# Patient Record
Sex: MALE | Employment: OTHER | URBAN - METROPOLITAN AREA
[De-identification: names, ages, dates, MRNs, and addresses within clinical notes are randomized per-mention and may not be internally consistent; named-entity substitution may affect disease eponyms.]

---

## 2018-01-14 ENCOUNTER — APPOINTMENT (OUTPATIENT)
Dept: GENERAL RADIOLOGY | Age: 43
End: 2018-01-14
Attending: EMERGENCY MEDICINE
Payer: SELF-PAY

## 2018-01-14 ENCOUNTER — HOSPITAL ENCOUNTER (OUTPATIENT)
Age: 43
Setting detail: OBSERVATION
Discharge: HOME | End: 2018-01-15
Attending: EMERGENCY MEDICINE | Admitting: INTERNAL MEDICINE
Payer: SELF-PAY

## 2018-01-14 DIAGNOSIS — S81.809A OPEN WOUND OF LOWER EXTREMITY, UNSPECIFIED LATERALITY, INITIAL ENCOUNTER: ICD-10-CM

## 2018-01-14 DIAGNOSIS — S81.809A MULTIPLE OPEN WOUNDS OF LOWER LEG, UNSPECIFIED LATERALITY, INITIAL ENCOUNTER: Primary | ICD-10-CM

## 2018-01-14 DIAGNOSIS — F11.23 OPIOID DEPENDENCE WITH WITHDRAWAL (HCC): ICD-10-CM

## 2018-01-14 DIAGNOSIS — K13.79 UVULAR EDEMA: ICD-10-CM

## 2018-01-14 DIAGNOSIS — L03.119 CELLULITIS OF LOWER EXTREMITY, UNSPECIFIED LATERALITY: ICD-10-CM

## 2018-01-14 PROBLEM — L03.90 CELLULITIS: Status: RESOLVED | Noted: 2018-01-14 | Resolved: 2018-01-14

## 2018-01-14 PROBLEM — G89.29 CHRONIC PAIN: Status: ACTIVE | Noted: 2018-01-14

## 2018-01-14 PROBLEM — L03.90 CELLULITIS: Status: ACTIVE | Noted: 2018-01-14

## 2018-01-14 PROBLEM — I10 HTN (HYPERTENSION): Status: ACTIVE | Noted: 2018-01-14

## 2018-01-14 PROBLEM — I83.009 CHRONIC CUTANEOUS VENOUS STASIS ULCER (HCC): Status: ACTIVE | Noted: 2018-01-14

## 2018-01-14 PROBLEM — L97.909 CHRONIC CUTANEOUS VENOUS STASIS ULCER (HCC): Status: ACTIVE | Noted: 2018-01-14

## 2018-01-14 LAB
ALBUMIN SERPL-MCNC: 3.2 G/DL (ref 3.5–5)
ALBUMIN/GLOB SERPL: 0.8 {RATIO} (ref 1.1–2.2)
ALP SERPL-CCNC: 84 U/L (ref 45–117)
ALT SERPL-CCNC: 30 U/L (ref 12–78)
AMPHET UR QL SCN: NEGATIVE
ANION GAP SERPL CALC-SCNC: 10 MMOL/L (ref 5–15)
AST SERPL-CCNC: 23 U/L (ref 15–37)
BARBITURATES UR QL SCN: NEGATIVE
BASOPHILS # BLD: 0 K/UL (ref 0–0.1)
BASOPHILS NFR BLD: 0 % (ref 0–1)
BENZODIAZ UR QL: NEGATIVE
BILIRUB SERPL-MCNC: 0.7 MG/DL (ref 0.2–1)
BNP SERPL-MCNC: 84 PG/ML (ref 0–125)
BUN SERPL-MCNC: 19 MG/DL (ref 6–20)
BUN/CREAT SERPL: 21 (ref 12–20)
CALCIUM SERPL-MCNC: 9.1 MG/DL (ref 8.5–10.1)
CANNABINOIDS UR QL SCN: NEGATIVE
CHLORIDE SERPL-SCNC: 103 MMOL/L (ref 97–108)
CO2 SERPL-SCNC: 28 MMOL/L (ref 21–32)
COCAINE UR QL SCN: NEGATIVE
CREAT SERPL-MCNC: 0.89 MG/DL (ref 0.7–1.3)
CRP SERPL-MCNC: 3.19 MG/DL
D DIMER PPP FEU-MCNC: 4.66 MG/L FEU (ref 0–0.65)
DEPRECATED S PYO AG THROAT QL EIA: NEGATIVE
DIFFERENTIAL METHOD BLD: ABNORMAL
DRUG SCRN COMMENT,DRGCM: ABNORMAL
EOSINOPHIL # BLD: 0.2 K/UL (ref 0–0.4)
EOSINOPHIL NFR BLD: 2 % (ref 0–7)
ERYTHROCYTE [DISTWIDTH] IN BLOOD BY AUTOMATED COUNT: 12.8 % (ref 11.5–14.5)
ERYTHROCYTE [SEDIMENTATION RATE] IN BLOOD: 49 MM/HR (ref 0–15)
EST. AVERAGE GLUCOSE BLD GHB EST-MCNC: 111 MG/DL
GLOBULIN SER CALC-MCNC: 4 G/DL (ref 2–4)
GLUCOSE SERPL-MCNC: 129 MG/DL (ref 65–100)
HBA1C MFR BLD: 5.5 % (ref 4.2–6.3)
HCT VFR BLD AUTO: 38.9 % (ref 36.6–50.3)
HGB BLD-MCNC: 13 G/DL (ref 12.1–17)
LACTATE SERPL-SCNC: 1.5 MMOL/L (ref 0.4–2)
LYMPHOCYTES # BLD: 1.3 K/UL
LYMPHOCYTES NFR BLD: 12 % (ref 12–49)
MAGNESIUM SERPL-MCNC: 1.7 MG/DL (ref 1.6–2.4)
MCH RBC QN AUTO: 28.1 PG (ref 26–34)
MCHC RBC AUTO-ENTMCNC: 33.4 G/DL (ref 30–36.5)
MCV RBC AUTO: 84.2 FL (ref 80–99)
METHADONE UR QL: POSITIVE
MONOCYTES # BLD: 0.5 K/UL (ref 0–1)
MONOCYTES NFR BLD: 5 % (ref 5–13)
NEUTS SEG # BLD: 8.9 K/UL (ref 1.8–8)
NEUTS SEG NFR BLD: 81 % (ref 32–75)
OPIATES UR QL: POSITIVE
PCP UR QL: NEGATIVE
PLATELET # BLD AUTO: 449 K/UL (ref 150–400)
POTASSIUM SERPL-SCNC: 3.5 MMOL/L (ref 3.5–5.1)
PROT SERPL-MCNC: 7.2 G/DL (ref 6.4–8.2)
RBC # BLD AUTO: 4.62 M/UL (ref 4.1–5.7)
RBC MORPH BLD: ABNORMAL
SODIUM SERPL-SCNC: 141 MMOL/L (ref 136–145)
WBC # BLD AUTO: 10.9 K/UL (ref 4.1–11.1)

## 2018-01-14 PROCEDURE — 99283 EMERGENCY DEPT VISIT LOW MDM: CPT

## 2018-01-14 PROCEDURE — 96374 THER/PROPH/DIAG INJ IV PUSH: CPT

## 2018-01-14 PROCEDURE — 85025 COMPLETE CBC W/AUTO DIFF WBC: CPT | Performed by: EMERGENCY MEDICINE

## 2018-01-14 PROCEDURE — 74011250636 HC RX REV CODE- 250/636

## 2018-01-14 PROCEDURE — 99218 HC RM OBSERVATION: CPT

## 2018-01-14 PROCEDURE — 83735 ASSAY OF MAGNESIUM: CPT | Performed by: EMERGENCY MEDICINE

## 2018-01-14 PROCEDURE — 87040 BLOOD CULTURE FOR BACTERIA: CPT | Performed by: EMERGENCY MEDICINE

## 2018-01-14 PROCEDURE — 83880 ASSAY OF NATRIURETIC PEPTIDE: CPT | Performed by: EMERGENCY MEDICINE

## 2018-01-14 PROCEDURE — 74011250637 HC RX REV CODE- 250/637: Performed by: INTERNAL MEDICINE

## 2018-01-14 PROCEDURE — 80307 DRUG TEST PRSMV CHEM ANLYZR: CPT | Performed by: INTERNAL MEDICINE

## 2018-01-14 PROCEDURE — 74011000258 HC RX REV CODE- 258: Performed by: EMERGENCY MEDICINE

## 2018-01-14 PROCEDURE — 96375 TX/PRO/DX INJ NEW DRUG ADDON: CPT

## 2018-01-14 PROCEDURE — 85379 FIBRIN DEGRADATION QUANT: CPT | Performed by: EMERGENCY MEDICINE

## 2018-01-14 PROCEDURE — 74011250636 HC RX REV CODE- 250/636: Performed by: INTERNAL MEDICINE

## 2018-01-14 PROCEDURE — 73590 X-RAY EXAM OF LOWER LEG: CPT

## 2018-01-14 PROCEDURE — 83605 ASSAY OF LACTIC ACID: CPT | Performed by: EMERGENCY MEDICINE

## 2018-01-14 PROCEDURE — 80053 COMPREHEN METABOLIC PANEL: CPT | Performed by: EMERGENCY MEDICINE

## 2018-01-14 PROCEDURE — 74011000250 HC RX REV CODE- 250: Performed by: INTERNAL MEDICINE

## 2018-01-14 PROCEDURE — 87880 STREP A ASSAY W/OPTIC: CPT | Performed by: INTERNAL MEDICINE

## 2018-01-14 PROCEDURE — 87070 CULTURE OTHR SPECIMN AEROBIC: CPT | Performed by: INTERNAL MEDICINE

## 2018-01-14 PROCEDURE — 74011250636 HC RX REV CODE- 250/636: Performed by: EMERGENCY MEDICINE

## 2018-01-14 PROCEDURE — 36415 COLL VENOUS BLD VENIPUNCTURE: CPT | Performed by: EMERGENCY MEDICINE

## 2018-01-14 PROCEDURE — 83036 HEMOGLOBIN GLYCOSYLATED A1C: CPT | Performed by: INTERNAL MEDICINE

## 2018-01-14 PROCEDURE — 85652 RBC SED RATE AUTOMATED: CPT | Performed by: EMERGENCY MEDICINE

## 2018-01-14 PROCEDURE — 86140 C-REACTIVE PROTEIN: CPT | Performed by: EMERGENCY MEDICINE

## 2018-01-14 RX ORDER — DEXAMETHASONE SODIUM PHOSPHATE 10 MG/ML
10 INJECTION INTRAMUSCULAR; INTRAVENOUS
Status: COMPLETED | OUTPATIENT
Start: 2018-01-14 | End: 2018-01-14

## 2018-01-14 RX ORDER — HYDRALAZINE HYDROCHLORIDE 20 MG/ML
10 INJECTION INTRAMUSCULAR; INTRAVENOUS
Status: DISCONTINUED | OUTPATIENT
Start: 2018-01-14 | End: 2018-01-15 | Stop reason: HOSPADM

## 2018-01-14 RX ORDER — CHOLECALCIFEROL (VITAMIN D3) 125 MCG
8 CAPSULE ORAL AS NEEDED
COMMUNITY
End: 2018-04-15

## 2018-01-14 RX ORDER — METHADONE HYDROCHLORIDE 10 MG/1
130 TABLET ORAL DAILY
Status: DISCONTINUED | OUTPATIENT
Start: 2018-01-14 | End: 2018-01-15 | Stop reason: HOSPADM

## 2018-01-14 RX ORDER — AMOXICILLIN 250 MG
1 CAPSULE ORAL DAILY
Status: DISCONTINUED | OUTPATIENT
Start: 2018-01-14 | End: 2018-01-15 | Stop reason: HOSPADM

## 2018-01-14 RX ORDER — ONDANSETRON 2 MG/ML
4 INJECTION INTRAMUSCULAR; INTRAVENOUS
Status: DISCONTINUED | OUTPATIENT
Start: 2018-01-14 | End: 2018-01-15 | Stop reason: HOSPADM

## 2018-01-14 RX ORDER — HYDROMORPHONE HYDROCHLORIDE 2 MG/ML
1 INJECTION, SOLUTION INTRAMUSCULAR; INTRAVENOUS; SUBCUTANEOUS
Status: COMPLETED | OUTPATIENT
Start: 2018-01-14 | End: 2018-01-14

## 2018-01-14 RX ORDER — HEPARIN SODIUM 5000 [USP'U]/ML
5000 INJECTION, SOLUTION INTRAVENOUS; SUBCUTANEOUS EVERY 8 HOURS
Status: DISCONTINUED | OUTPATIENT
Start: 2018-01-14 | End: 2018-01-15 | Stop reason: HOSPADM

## 2018-01-14 RX ORDER — KETOROLAC TROMETHAMINE 30 MG/ML
30 INJECTION, SOLUTION INTRAMUSCULAR; INTRAVENOUS EVERY 6 HOURS
Status: COMPLETED | OUTPATIENT
Start: 2018-01-14 | End: 2018-01-15

## 2018-01-14 RX ORDER — SODIUM CHLORIDE 0.9 % (FLUSH) 0.9 %
5-10 SYRINGE (ML) INJECTION AS NEEDED
Status: DISCONTINUED | OUTPATIENT
Start: 2018-01-14 | End: 2018-01-15 | Stop reason: HOSPADM

## 2018-01-14 RX ORDER — KETOROLAC TROMETHAMINE 30 MG/ML
30 INJECTION, SOLUTION INTRAMUSCULAR; INTRAVENOUS
Status: COMPLETED | OUTPATIENT
Start: 2018-01-14 | End: 2018-01-14

## 2018-01-14 RX ORDER — SODIUM CHLORIDE 0.9 % (FLUSH) 0.9 %
5-10 SYRINGE (ML) INJECTION EVERY 8 HOURS
Status: DISCONTINUED | OUTPATIENT
Start: 2018-01-14 | End: 2018-01-15 | Stop reason: HOSPADM

## 2018-01-14 RX ORDER — DIPHENHYDRAMINE HYDROCHLORIDE 50 MG/ML
INJECTION, SOLUTION INTRAMUSCULAR; INTRAVENOUS
Status: DISPENSED
Start: 2018-01-14 | End: 2018-01-14

## 2018-01-14 RX ORDER — OXYCODONE HYDROCHLORIDE 5 MG/1
20 TABLET ORAL
Status: DISCONTINUED | OUTPATIENT
Start: 2018-01-14 | End: 2018-01-15 | Stop reason: HOSPADM

## 2018-01-14 RX ORDER — ONDANSETRON 2 MG/ML
4 INJECTION INTRAMUSCULAR; INTRAVENOUS
Status: COMPLETED | OUTPATIENT
Start: 2018-01-14 | End: 2018-01-14

## 2018-01-14 RX ORDER — VANCOMYCIN/0.9 % SOD CHLORIDE 1.5G/250ML
1500 PLASTIC BAG, INJECTION (ML) INTRAVENOUS
Status: COMPLETED | OUTPATIENT
Start: 2018-01-14 | End: 2018-01-14

## 2018-01-14 RX ORDER — DIPHENHYDRAMINE HYDROCHLORIDE 50 MG/ML
25 INJECTION, SOLUTION INTRAMUSCULAR; INTRAVENOUS
Status: COMPLETED | OUTPATIENT
Start: 2018-01-14 | End: 2018-01-14

## 2018-01-14 RX ORDER — VANCOMYCIN/0.9 % SOD CHLORIDE 1.5G/250ML
1500 PLASTIC BAG, INJECTION (ML) INTRAVENOUS EVERY 8 HOURS
Status: DISCONTINUED | OUTPATIENT
Start: 2018-01-14 | End: 2018-01-15 | Stop reason: HOSPADM

## 2018-01-14 RX ORDER — KETOROLAC TROMETHAMINE 30 MG/ML
INJECTION, SOLUTION INTRAMUSCULAR; INTRAVENOUS
Status: COMPLETED
Start: 2018-01-14 | End: 2018-01-14

## 2018-01-14 RX ORDER — OXYCODONE HYDROCHLORIDE 5 MG/1
20 TABLET ORAL
Status: DISCONTINUED | OUTPATIENT
Start: 2018-01-14 | End: 2018-01-14

## 2018-01-14 RX ORDER — LABETALOL HYDROCHLORIDE 5 MG/ML
10 INJECTION, SOLUTION INTRAVENOUS
Status: DISCONTINUED | OUTPATIENT
Start: 2018-01-14 | End: 2018-01-15 | Stop reason: HOSPADM

## 2018-01-14 RX ORDER — NALOXONE HYDROCHLORIDE 0.4 MG/ML
0.4 INJECTION, SOLUTION INTRAMUSCULAR; INTRAVENOUS; SUBCUTANEOUS AS NEEDED
Status: DISCONTINUED | OUTPATIENT
Start: 2018-01-14 | End: 2018-01-15 | Stop reason: HOSPADM

## 2018-01-14 RX ORDER — TRAMADOL HYDROCHLORIDE 50 MG/1
100 TABLET ORAL
Status: DISCONTINUED | OUTPATIENT
Start: 2018-01-14 | End: 2018-01-15 | Stop reason: HOSPADM

## 2018-01-14 RX ORDER — DEXAMETHASONE SODIUM PHOSPHATE 10 MG/ML
10 INJECTION INTRAMUSCULAR; INTRAVENOUS EVERY 6 HOURS
Status: DISCONTINUED | OUTPATIENT
Start: 2018-01-14 | End: 2018-01-15 | Stop reason: HOSPADM

## 2018-01-14 RX ADMIN — OXYCODONE HYDROCHLORIDE 20 MG: 5 TABLET ORAL at 22:35

## 2018-01-14 RX ADMIN — HEPARIN SODIUM 5000 UNITS: 5000 INJECTION, SOLUTION INTRAVENOUS; SUBCUTANEOUS at 22:35

## 2018-01-14 RX ADMIN — PIPERACILLIN SODIUM AND TAZOBACTAM SODIUM 3.38 G: 3; .375 INJECTION, POWDER, LYOPHILIZED, FOR SOLUTION INTRAVENOUS at 03:39

## 2018-01-14 RX ADMIN — FAMOTIDINE 20 MG: 10 INJECTION, SOLUTION INTRAVENOUS at 09:32

## 2018-01-14 RX ADMIN — ONDANSETRON 4 MG: 2 INJECTION INTRAMUSCULAR; INTRAVENOUS at 03:19

## 2018-01-14 RX ADMIN — DEXAMETHASONE SODIUM PHOSPHATE 10 MG: 10 INJECTION, SOLUTION INTRAMUSCULAR; INTRAVENOUS at 05:36

## 2018-01-14 RX ADMIN — OXYCODONE HYDROCHLORIDE 20 MG: 5 TABLET ORAL at 14:23

## 2018-01-14 RX ADMIN — TRAMADOL HYDROCHLORIDE 100 MG: 50 TABLET, FILM COATED ORAL at 15:54

## 2018-01-14 RX ADMIN — FAMOTIDINE 20 MG: 10 INJECTION, SOLUTION INTRAVENOUS at 20:35

## 2018-01-14 RX ADMIN — HEPARIN SODIUM 5000 UNITS: 5000 INJECTION, SOLUTION INTRAVENOUS; SUBCUTANEOUS at 14:12

## 2018-01-14 RX ADMIN — METHADONE HYDROCHLORIDE 130 MG: 10 TABLET ORAL at 09:40

## 2018-01-14 RX ADMIN — KETOROLAC TROMETHAMINE 30 MG: 30 INJECTION, SOLUTION INTRAMUSCULAR at 17:53

## 2018-01-14 RX ADMIN — VANCOMYCIN HYDROCHLORIDE 1500 MG: 10 INJECTION, POWDER, LYOPHILIZED, FOR SOLUTION INTRAVENOUS at 04:00

## 2018-01-14 RX ADMIN — OXYCODONE HYDROCHLORIDE 20 MG: 5 TABLET ORAL at 04:07

## 2018-01-14 RX ADMIN — KETOROLAC TROMETHAMINE 30 MG: 30 INJECTION, SOLUTION INTRAMUSCULAR at 07:55

## 2018-01-14 RX ADMIN — KETOROLAC TROMETHAMINE 30 MG: 30 INJECTION, SOLUTION INTRAMUSCULAR at 23:26

## 2018-01-14 RX ADMIN — OXYCODONE HYDROCHLORIDE 20 MG: 5 TABLET ORAL at 07:57

## 2018-01-14 RX ADMIN — VANCOMYCIN HYDROCHLORIDE 1500 MG: 10 INJECTION, POWDER, LYOPHILIZED, FOR SOLUTION INTRAVENOUS at 12:07

## 2018-01-14 RX ADMIN — HEPARIN SODIUM 5000 UNITS: 5000 INJECTION, SOLUTION INTRAVENOUS; SUBCUTANEOUS at 06:00

## 2018-01-14 RX ADMIN — TRAMADOL HYDROCHLORIDE 100 MG: 50 TABLET, FILM COATED ORAL at 05:18

## 2018-01-14 RX ADMIN — OXYCODONE HYDROCHLORIDE 20 MG: 5 TABLET ORAL at 17:53

## 2018-01-14 RX ADMIN — DEXAMETHASONE SODIUM PHOSPHATE 10 MG: 10 INJECTION, SOLUTION INTRAMUSCULAR; INTRAVENOUS at 12:07

## 2018-01-14 RX ADMIN — Medication 10 ML: at 22:35

## 2018-01-14 RX ADMIN — Medication 10 ML: at 14:12

## 2018-01-14 RX ADMIN — DIPHENHYDRAMINE HYDROCHLORIDE 25 MG: 50 INJECTION, SOLUTION INTRAMUSCULAR; INTRAVENOUS at 05:36

## 2018-01-14 RX ADMIN — DEXAMETHASONE SODIUM PHOSPHATE 10 MG: 10 INJECTION, SOLUTION INTRAMUSCULAR; INTRAVENOUS at 23:25

## 2018-01-14 RX ADMIN — VANCOMYCIN HYDROCHLORIDE 1500 MG: 10 INJECTION, POWDER, LYOPHILIZED, FOR SOLUTION INTRAVENOUS at 20:34

## 2018-01-14 RX ADMIN — HYDROMORPHONE HYDROCHLORIDE 1 MG: 2 INJECTION INTRAMUSCULAR; INTRAVENOUS; SUBCUTANEOUS at 03:22

## 2018-01-14 RX ADMIN — DEXAMETHASONE SODIUM PHOSPHATE 10 MG: 10 INJECTION, SOLUTION INTRAMUSCULAR; INTRAVENOUS at 17:53

## 2018-01-14 RX ADMIN — KETOROLAC TROMETHAMINE 30 MG: 30 INJECTION, SOLUTION INTRAMUSCULAR at 09:31

## 2018-01-14 RX ADMIN — KETOROLAC TROMETHAMINE 30 MG: 30 INJECTION, SOLUTION INTRAMUSCULAR at 03:39

## 2018-01-14 NOTE — ED TRIAGE NOTES
My legs and ankles have been swelled up for over a month, seen at [facility] twice and walked out  Before finishing treatment. My legs are weaping they are so full and I can barely move because of the pain. Cannot put legs up in bed because it just makes them hurt more. I do not want you to do anything to my legs; I just want the pain to be gone.

## 2018-01-14 NOTE — PROGRESS NOTES
0715 - TRANSFER - IN REPORT:    Verbal report received from Dot Castle RN(name) on Rita Ospina  being received from [address](unit) for routine progression of care      Report consisted of patients Situation, Background, Assessment and   Recommendations(SBAR). Information from the following report(s) SBAR, ED Summary, MAR, Recent Results and Cardiac Rhythm sinus tachycardia was reviewed with the receiving nurse. Opportunity for questions and clarification was provided. 3461 - Patient arrived via stretcher and assisted to transfer to bed. Patient is moaning out in pain and states he cannot stand the pain in his legs. He is unable to get comfortable or lie still in the bed. Family practice paged and Dr. Ciara Patricia to come and see patient. Patient denies any shortness of breath or difficulty breathing; he is on room air. He states he is thirsty and wants water to drink. Patient given water, which he was able to drink without any difficulty. Primary Nurse Saeid Pérez RN and Brenden Hammond RN performed a dual skin assessment on this patient Impairment noted- see wound doc flow sheet. He has multiple lesions on bilateral lower extremities that appear like venous stasis type ulcers, which are open and weeping. He has blisters to bilateral heels. Jeremiah score is 22     1045 - I spoke to Dr. Chetan Sharp regarding vascular consult ordered. He will be in later this afternoon to see the patient. TRANSFER - OUT REPORT:    Verbal report given to Jessica Valdivia RN(name) on Rita Ospina  being transferred to 4th floor(unit) for routine progression of care       Report consisted of patients Situation, Background, Assessment and   Recommendations(SBAR). Information from the following report(s) SBAR, MAR, Recent Results and Cardiac Rhythm NSR was reviewed with the receiving nurse.     Lines:   Peripheral IV 01/14/18 Left Antecubital (Active)   Site Assessment Clean, dry, & intact 1/14/2018 12:00 PM   Phlebitis Assessment 0 1/14/2018 12:00 PM   Infiltration Assessment 0 1/14/2018 12:00 PM   Dressing Status Clean, dry, & intact 1/14/2018 12:00 PM   Dressing Type Transparent;Tape 1/14/2018 12:00 PM   Hub Color/Line Status Pink;Capped 1/14/2018 12:00 PM   Action Taken Open ports on tubing capped 1/14/2018 12:00 PM   Alcohol Cap Used Yes 1/14/2018 12:00 PM       Peripheral IV 01/14/18 Right Antecubital (Active)   Site Assessment Drainage (comment) 1/14/2018 12:00 PM   Phlebitis Assessment 0 1/14/2018 12:00 PM   Infiltration Assessment 0 1/14/2018 12:00 PM   Dressing Status Clean, dry, & intact 1/14/2018 12:00 PM   Dressing Type Transparent;Tape 1/14/2018 12:00 PM   Hub Color/Line Status Pink;Capped 1/14/2018 12:00 PM   Action Taken Open ports on tubing capped 1/14/2018 12:00 PM   Alcohol Cap Used Yes 1/14/2018 12:00 PM        Opportunity for questions and clarification was provided.       Patient transported with:   Royal Madina

## 2018-01-14 NOTE — IP AVS SNAPSHOT
[facility] 
 
 
 [address] 
[phone] Patient: Nadja Batres MRN: [id number] :1975 About your hospitalization You were admitted on:  2018 You last received care in the:  [facility] 4M POST SURG ORT 2 You were discharged on:  January 15, 2018 Why you were hospitalized Your primary diagnosis was:  Not on File Your diagnoses also included:  Cellulitis, Open Leg Wound, Chronic Cutaneous Venous Stasis Ulcer (Hcc), Htn (Hypertension), Chronic Pain Follow-up Information Follow up With Details Comments Contact Info Houston Salas MD Go on 2018 New patient primary care [facility] follow-up appointment at 4:00PM. Please arrive 30 minutes early to check in with your photo ID and insurance card. [address] [facility] 
[phone] 
  
   [facility] wound care clinic -[phone] Your Scheduled Appointments 2018 12:30 PM EST  
WOUND CARE NEW PATIENT with Brooke Travis MD, [address] [place] [address] ([address]) [facility] [address] [facility] [phone] 2018  4:00 PM EST New Patient with Houston Salas MD  
[address] [place] [address] and Primary Care [facility] [address] [id number]  
[phone] Discharge Orders None A check nadine indicates which time of day the medication should be taken. My Medications START taking these medications Instructions Each Dose to Equal  
 Morning Noon Evening Bedtime  
 methadone 10 mg tablet Commonly known as:  DOLOPHINE Start taking on:  2018 Replaces:  METHADONE (BULK) Your last dose was:  1/15/18 0800 a.m. Your next dose is:  18 morning Take 13 Tabs by mouth daily for 3 days. Max Daily Amount: 130 mg. 130 mg CONTINUE taking these medications Instructions Each Dose to Equal  
 Morning Noon Evening Bedtime ALEVE 220 mg Cap Generic drug:  naproxen sodium Your last dose was:  Have not had in the hospital.   
Your next dose is:  Anytime as needed. Take 8 Caps by mouth as needed. 8 Cap HYDROcodone-acetaminophen 5-325 mg per tablet Commonly known as:  Gerald Ontiverosens Your last dose was:  Have not had in the hospital.   
Your next dose is:  Anytime as needed. Take 1 Tab by mouth every four (4) hours as needed for Pain. 1 Tab  
    
   
   
   
  
 ibuprofen 600 mg tablet Commonly known as:  MOTRIN Your last dose was:  Have not taken in the hospital.   
Your next dose is:  Anytime as needed. Take 1 Tab by mouth every six (6) hours as needed for Pain. 600 mg  
    
   
   
   
  
  
STOP taking these medications METHADONE (BULK) Replaced by:  methadone 10 mg tablet Where to Get Your Medications Information on where to get these meds will be given to you by the nurse or doctor. ! Ask your nurse or doctor about these medications  
  methadone 10 mg tablet Discharge Instructions ACUTE DIAGNOSES: 
Chronic cutaneous venous stasis ulcer ([facility] [address] Chronic cutaneous venous stasis ulcer ([facility] [address] CHRONIC MEDICAL DIAGNOSES: 
Problem List as of 1/15/2018  Date Reviewed: 1/15/2018 Codes Class Noted - Resolved Open leg wound ICD-10-CM: L84.986U ICD-9-CM: 891.0  1/14/2018 - Present Chronic cutaneous venous stasis ulcer (HCC) ICD-10-CM: I83.009 ICD-9-CM: 454.0  1/14/2018 - Present HTN (hypertension) ICD-10-CM: I10 
ICD-9-CM: 401.9  1/14/2018 - Present Chronic pain ICD-10-CM: G89.29 ICD-9-CM: 338.29  1/14/2018 - Present RESOLVED: Cellulitis ICD-10-CM: L03.90 ICD-9-CM: 682.9  1/14/2018 - 1/14/2018 DISCHARGE MEDICATIONS:  
  
 
 
 · It is important that you take the medication exactly as they are prescribed. · Keep your medication in the bottles provided by the pharmacist and keep a list of the medication names, dosages, and times to be taken in your wallet. · Do not take other medications without consulting your doctor. DIET:  Regular Diet ACTIVITY: Activity as tolerated ADDITIONAL INFORMATION: If you experience any of the following symptoms then please call your primary care physician or return to the emergency room if you cannot get hold of your doctor: Fever, chills, nausea, vomiting, diarrhea, change in mentation, falling, bleeding, shortness of breath. FOLLOW UP CARE: 
Primary care physician. you are to call and set up an appointment to see them in 2 weeks. pain and wound clinic Information obtained by : 
I understand that if any problems occur once I am at home I am to contact my physician. I understand and acknowledge receipt of the instructions indicated above. Physician's or R.N.'s Signature                                                                  Date/Time Patient or Representative Signature                                                          Date/Time Introducing [facility]! Lauren Platt introduces [employer] patient portal. Now you can access parts of your medical record, email your doctor's office, and request medication refills online. 1. In your internet browser, go to [email or web address] 2. Click on the First Time User? Click Here link in the Sign In box. You will see the New Member Sign Up page. 3. Enter your [employer] Access Code exactly as it appears below.  You will not need to use this code after youve completed the sign-up process. If you do not sign up before the expiration date, you must request a new code. · [employer] Access Code: [email or web address] Expires: 4/15/2018 10:13 AM 
 
4. Enter the last four digits of your Social Security Number (xxxx) and Date of Birth (mm/dd/yyyy) as indicated and click Submit. You will be taken to the next sign-up page. 5. Create a [employer] ID. This will be your [employer] login ID and cannot be changed, so think of one that is secure and easy to remember. 6. Create a [employer] password. You can change your password at any time. 7. Enter your Password Reset Question and Answer. This can be used at a later time if you forget your password. 8. Enter your e-mail address. You will receive e-mail notification when new information is available in [address] 9. Click Sign Up. You can now view and download portions of your medical record. 10. Click the Download Summary menu link to download a portable copy of your medical information. If you have questions, please visit the Frequently Asked Questions section of the [employer] website. Remember, [employer] is NOT to be used for urgent needs. For medical emergencies, dial 911. Now available from your iPhone and Android! Unresulted Labs-Please follow up with your PCP about these lab tests Order Current Status CULTURE, THROAT In process CULTURE, BLOOD, PAIRED Preliminary result Providers Seen During Your Hospitalization Provider Specialty Primary office phone Maulik Barrow MD Emergency Medicine [phone] Theodore Watson MD Internal Medicine [phone] Your Primary Care Physician (PCP) Primary Care Physician Office Phone Office Fax NONE ** None ** ** None ** You are allergic to the following Allergen Reactions Pcn (Penicillins) Swelling Recent Documentation Height Weight BMI Smoking Status 1.727 m 95.3 kg 31.93 kg/m2 Current Every Day Smoker Emergency Contacts Name Discharge Info Relation Home Work Mobile Qi Álvarez DISCHARGE CAREGIVER [3] Other Relative [6] [phone] Patient Belongings The following personal items are in your possession at time of discharge: 
  Dental Appliances: None  Visual Aid: None      Home Medications: None   Jewelry: None  Clothing: At bedside    Other Valuables: None  Personal Items Sent to Safe: n/a Please provide this summary of care documentation to your next provider. Signatures-by signing, you are acknowledging that this After Visit Summary has been reviewed with you and you have received a copy. Patient Signature:  ____________________________________________________________ Date:  ____________________________________________________________  
  
Jana Kuhn Provider Signature:  ____________________________________________________________ Date:  ____________________________________________________________

## 2018-01-14 NOTE — ED NOTES
Patient continues to refuse to stay on the cardiac monitor. Pt continues to refuse to have BP taken.

## 2018-01-14 NOTE — ED NOTES
Pt report given to [facility] paramedic with [facility] pt remains stable at time of discharge

## 2018-01-14 NOTE — IP AVS SNAPSHOT
[address] 
[phone] Patient: Anju Mary MRN: [id number] :1975 A check nadine indicates which time of day the medication should be taken. My Medications START taking these medications Instructions Each Dose to Equal  
 Morning Noon Evening Bedtime  
 methadone 10 mg tablet Commonly known as:  DOLOPHINE Start taking on:  2018 Replaces:  METHADONE (BULK) Your last dose was:  1/15/18 0800 a.m. Your next dose is:  18 morning Take 13 Tabs by mouth daily for 3 days. Max Daily Amount: 130 mg.  
 130 mg CONTINUE taking these medications Instructions Each Dose to Equal  
 Morning Noon Evening Bedtime ALEVE 220 mg Cap Generic drug:  naproxen sodium Your last dose was:  Have not had in the hospital.   
Your next dose is:  Anytime as needed. Take 8 Caps by mouth as needed. 8 Cap HYDROcodone-acetaminophen 5-325 mg per tablet Commonly known as:  Destini Waldrop Your last dose was:  Have not had in the hospital.   
Your next dose is:  Anytime as needed. Take 1 Tab by mouth every four (4) hours as needed for Pain. 1 Tab  
    
   
   
   
  
 ibuprofen 600 mg tablet Commonly known as:  MOTRIN Your last dose was:  Have not taken in the hospital.   
Your next dose is:  Anytime as needed. Take 1 Tab by mouth every six (6) hours as needed for Pain. 600 mg  
    
   
   
   
  
  
STOP taking these medications METHADONE (BULK) Replaced by:  methadone 10 mg tablet Where to Get Your Medications Information on where to get these meds will be given to you by the nurse or doctor. ! Ask your nurse or doctor about these medications  
  methadone 10 mg tablet

## 2018-01-14 NOTE — ED NOTES
Patient complaining that we are not giving him enough medicine and that he is just going to take his own and removes 2 blue pills from pocket. Patient states these are Aleve. Requested patient to not take these so that the doctor can know what he is taking and manage his care. Patient did not take the medicine but has them lying on the bedside table. Unsure how many more pills or other medicines patient has in his pockets.

## 2018-01-14 NOTE — H&P
[facility]  [address], [address]  [phone]    Admission History and Physical      NAME:  Erich Marshall   :   1975   MRN:  [id number]     PCP:  None     Date/Time:  2018         Subjective:     CHIEF COMPLAINT: leg pain      HISTORY OF PRESENT ILLNESS:     Mr. Dania Boas is a 36 y.o.  male who is admitted with BL leg wound/ pain. Mr. Dania Boas with Hx of chronic leg pain presented to ER c/o severe bl leg pain. The pian is chronic, but worsened the last 3-4 weeks after he stopped taking methadone. Pt stated that he missed his regular visit at methadone clinic and for the last 5 days, his methadone was discontinued. Denies fever or SOB. The pain is sharp, severe in intensity on BL legs, worse with movement. History reviewed. No pertinent past medical history. Past Surgical History:   Procedure Laterality Date    HX CORONARY STENT PLACEMENT         Social History   Substance Use Topics    Smoking status: Current Every Day Smoker     Packs/day: 1.00    Smokeless tobacco: Never Used    Alcohol use No        Family History   Problem Relation Age of Onset    Family history unknown: Yes        Allergies   Allergen Reactions    Pcn [Penicillins] Swelling        Prior to Admission medications    Medication Sig Start Date End Date Taking? Authorizing Provider   naproxen sodium (ALEVE) 220 mg cap Take 8 Caps by mouth as needed. Yes Phys Other, MD   METHADONE HCL (METHADONE, BULK,) 130 mg by Does Not Apply route daily. Yes Phys Other, MD   ibuprofen (MOTRIN) 600 mg tablet Take 1 Tab by mouth every six (6) hours as needed for Pain.    BRYSON Eason   HYDROcodone-acetaminophen (NORCO) 5-325 mg per tablet Take 1 Tab by mouth every four (4) hours as needed for Pain.     BRYSON Eason         Review of Systems:  (bold if positive, if negative)    Gen:  Eyes:  ENT:  CVS:  Pulm:  GI:    :    MS:  Pain,Skin:  Psych: Endo:    Hem:  Renal:    Neuro:            Objective:      VITALS:    Vital signs reviewed; most recent are:    Visit Vitals    /90 (BP 1 Location: Right arm, BP Patient Position: During activity)    Pulse (!) 105    Temp 97.9 °F (36.6 °C)    Resp 18    Ht 5' 8\" (1.727 m)    Wt 95.3 kg (210 lb)    SpO2 98%    BMI 31.93 kg/m2     SpO2 Readings from Last 6 Encounters:   01/14/18 98%   06/25/13 93%          Intake/Output Summary (Last 24 hours) at 01/14/18 0930  Last data filed at 01/14/18 0914   Gross per 24 hour   Intake                0 ml   Output              250 ml   Net             -250 ml            Exam:     Physical Exam:    Gen:  Well-developed, well-nourished, in no acute distress  HEENT:  Pink conjunctivae, PERRL, hearing intact to voice, moist mucous membranes  Neck:  Supple, without masses, thyroid non-tender  Resp:  No accessory muscle use, clear breath sounds without wheezes rales or rhonchi  Card:  No murmurs, normal S1, S2 without thrills, bruits or peripheral edema  Abd:  Soft, non-tender, non-distended, normoactive bowel sounds are present, no palpable organomegaly and no detectable hernias  Lymph:  No cervical or inguinal adenopathy  Musc:  No cyanosis or clubbing  Skin:  Chronic bL leg wound. Neuro:  Cranial nerves are grossly intact, no focal motor weakness, follows commands appropriately  Psych:  Good insight, oriented to person, place and time, alert       Labs:    Recent Labs      01/14/18   0309   WBC  10.9   HGB  13.0   HCT  38.9   PLT  449*     Recent Labs      01/14/18   0309   NA  141   K  3.5   CL  103   CO2  28   GLU  129*   BUN  19   CREA  0.89   CA  9.1   MG  1.7   ALB  3.2*   TBILI  0.7   SGOT  23   ALT  30     No results found for: GLUCPOC  No results for input(s): PH, PCO2, PO2, HCO3, FIO2 in the last 72 hours. No results for input(s): INR in the last 72 hours. No lab exists for component: INREXT    Telemetry reviewed:          Assessment/Plan:    1.   Chronic pain (1/14/2018)/ Open leg wound (1/14/2018)/ Chronic cutaneous venous stasis ulcer ([facility] [address] (1/14/2018). Wound care. Pain mangment with methadone and oxycodone. Palliative care consult for pain management. Consult wound care specialist. Areas don not look like infected, but will continue vancomycin. Consulted vascular surgery     2. Elevated BP. No Hx of HTN. This is likely secondary to severe pain. Continue monitoring BP with PRN meds     3. Uvula swelling/ pain/ redness. Doubt infection, but will check rapid strep test. Symptomatic treatment with Cepacol.           Previous medical records reviewed     Risk of deterioration: high      Total time spent with patient: 79 [address] discussed with: Patient, Nursing Staff and >50% of time spent in counseling and coordination of care    Discussed:  Care Plan    Prophylaxis:  Lovenox    Probable Disposition:  Home w/Family           ___________________________________________________    Attending Physician: Nasra Barakat MD

## 2018-01-14 NOTE — ED NOTES
TRANSFER - OUT REPORT:    Verbal report given to Kieran Courtney RN (name) on Chava Lara  being transferred to [facility] ICU 9 (unit) for routine progression of care       Report consisted of patients Situation, Background, Assessment and   Recommendations(SBAR). Information from the following report(s) SBAR, ED Summary, [facility] and Recent Results was reviewed with the receiving nurse. Lines:   Peripheral IV 01/14/18 Left Antecubital (Active)   Site Assessment Clean, dry, & intact 1/14/2018  3:05 AM   Phlebitis Assessment 0 1/14/2018  3:05 AM   Infiltration Assessment 0 1/14/2018  3:05 AM   Dressing Status Clean, dry, & intact 1/14/2018  3:05 AM   Dressing Type Transparent 1/14/2018  3:05 AM   Hub Color/Line Status Pink;Flushed 1/14/2018  3:05 AM   Action Taken Blood drawn 1/14/2018  3:05 AM       Peripheral IV 01/14/18 Right Antecubital (Active)   Site Assessment Clean, dry, & intact 1/14/2018  3:05 AM   Phlebitis Assessment 0 1/14/2018  3:05 AM   Infiltration Assessment 0 1/14/2018  3:05 AM   Dressing Status Clean, dry, & intact 1/14/2018  3:05 AM   Dressing Type Transparent 1/14/2018  3:05 AM   Hub Color/Line Status Pink;Flushed 1/14/2018  3:05 AM   Action Taken Blood drawn 1/14/2018  3:05 AM        Opportunity for questions and clarification was provided.       Patient transported with:   Monitor, Saline lock

## 2018-01-14 NOTE — ED NOTES
Patient keeps bending left arm so IV pump will not infuse. Continues asking for more medication and also requesting to talk to the doctor. Dr Nadja Pisano notified and in to speak with the patient.

## 2018-01-14 NOTE — CONSULTS
Vascular Surgery Consult    Reason:  Stasis wounds    HPI:  Patient is a 37 yo male who presented with pain in both legs and breathing issues; is on chronic pain regimen for this per pt.  --he reports ~ 6 week hx  --no prior medical care for this; Patient Active Problem List   Diagnosis Code    Open leg wound S81.809A    Chronic cutaneous venous stasis ulcer (HCC) I83.009    HTN (hypertension) I10    Chronic pain G89.29     No current facility-administered medications on file prior to encounter. Current Outpatient Prescriptions on File Prior to Encounter   Medication Sig Dispense Refill    ibuprofen (MOTRIN) 600 mg tablet Take 1 Tab by mouth every six (6) hours as needed for Pain. 20 Tab 0    HYDROcodone-acetaminophen (NORCO) 5-325 mg per tablet Take 1 Tab by mouth every four (4) hours as needed for Pain. 15 Tab 0     SH: tobacco, no daily EtOH    PE:  Visit Vitals    /85    Pulse 86    Temp 97.6 °F (36.4 °C)    Resp 20    Ht 5' 8\" (1.727 m)    Wt 95.3 kg (210 lb)    SpO2 100%    BMI 31.93 kg/m2     NAD  Clear lungs  RRR  PER:  Palpable dp pulses bilaterally; mild-mod pedal edema  --crusty stasis wounds (relatively superficial)--he is unable to tolerate cleansing these or manipulating this now--they are more tender than the appearance suggests    Recent Results (from the past 24 hour(s))   CBC WITH AUTOMATED DIFF    Collection Time: 01/14/18  3:09 AM   Result Value Ref Range    WBC 10.9 4.1 - 11.1 K/uL    RBC 4.62 4.10 - 5.70 M/uL    HGB 13.0 12.1 - 17.0 g/dL    HCT 38.9 36.6 - 50.3 %    MCV 84.2 80.0 - 99.0 FL    MCH 28.1 26.0 - 34.0 PG    MCHC 33.4 30.0 - 36.5 g/dL    RDW 12.8 11.5 - 14.5 %    PLATELET 364 (H) 432 - 400 K/uL    NEUTROPHILS 81 (H) 32 - 75 %    LYMPHOCYTES 12 12 - 49 %    MONOCYTES 5 5 - 13 %    EOSINOPHILS 2 0 - 7 %    BASOPHILS 0 0 - 1 %    ABS. NEUTROPHILS 8.9 (H) 1.8 - 8.0 K/UL    ABS. LYMPHOCYTES 1.3 K/UL    ABS. MONOCYTES 0.5 0.0 - 1.0 K/UL    ABS.  EOSINOPHILS 0.2 0.0 - 0.4 K/UL    ABS. BASOPHILS 0.0 0.0 - 0.1 K/UL    DF AUTOMATED      RBC COMMENTS NORMOCYTIC, NORMOCHROMIC     METABOLIC PANEL, COMPREHENSIVE    Collection Time: 01/14/18  3:09 AM   Result Value Ref Range    Sodium 141 136 - 145 mmol/L    Potassium 3.5 3.5 - 5.1 mmol/L    Chloride 103 97 - 108 mmol/L    CO2 28 21 - 32 mmol/L    Anion gap 10 5 - 15 mmol/L    Glucose 129 (H) 65 - 100 mg/dL    BUN 19 6 - 20 MG/DL    Creatinine 0.89 0.70 - 1.30 MG/DL    BUN/Creatinine ratio 21 (H) 12 - 20      GFR est AA >60 >60 ml/min/1.73m2    GFR est non-AA >60 >60 ml/min/1.73m2    Calcium 9.1 8.5 - 10.1 MG/DL    Bilirubin, total 0.7 0.2 - 1.0 MG/DL    ALT (SGPT) 30 12 - 78 U/L    AST (SGOT) 23 15 - 37 U/L    Alk.  phosphatase 84 45 - 117 U/L    Protein, total 7.2 6.4 - 8.2 g/dL    Albumin 3.2 (L) 3.5 - 5.0 g/dL    Globulin 4.0 2.0 - 4.0 g/dL    A-G Ratio 0.8 (L) 1.1 - 2.2     NT-PRO BNP    Collection Time: 01/14/18  3:09 AM   Result Value Ref Range    NT pro-BNP 84 0 - 125 PG/ML   CULTURE, BLOOD, PAIRED    Collection Time: 01/14/18  3:09 AM   Result Value Ref Range    Special Requests: NO SPECIAL REQUESTS      Culture result: NO GROWTH AFTER 2 HOURS     LACTIC ACID    Collection Time: 01/14/18  3:09 AM   Result Value Ref Range    Lactic acid 1.5 0.4 - 2.0 MMOL/L   C REACTIVE PROTEIN, QT    Collection Time: 01/14/18  3:09 AM   Result Value Ref Range    C-Reactive protein 3.19 (H) <0.60 mg/dL   SED RATE (ESR)    Collection Time: 01/14/18  3:09 AM   Result Value Ref Range    Sed rate, automated 49 (H) 0 - 15 mm/hr   D DIMER    Collection Time: 01/14/18  3:09 AM   Result Value Ref Range    D-dimer 4.66 (H) 0.00 - 0.65 mg/L FEU   HEMOGLOBIN A1C WITH EAG    Collection Time: 01/14/18  3:09 AM   Result Value Ref Range    Hemoglobin A1c 5.5 4.2 - 6.3 %    Est. average glucose 111 mg/dL   MAGNESIUM    Collection Time: 01/14/18  3:09 AM   Result Value Ref Range    Magnesium 1.7 1.6 - 2.4 mg/dL   DRUG SCREEN, URINE    Collection Time: 01/14/18  5:05 AM   Result Value Ref Range    AMPHETAMINES NEGATIVE  NEG      BARBITURATES NEGATIVE  NEG      BENZODIAZEPINES NEGATIVE  NEG      COCAINE NEGATIVE  NEG      METHADONE POSITIVE (A) NEG      OPIATES POSITIVE (A) NEG      PCP(PHENCYCLIDINE) NEGATIVE  NEG      THC (TH-CANNABINOL) NEGATIVE  NEG      Drug screen comment (NOTE)      A/P  --chronic stasis related wounds that would improve with simple outpatient compression wraps  --in meantime, would ask wound care nurses to see in am and would recommend making an appointment with the [facility]  --elevate legs while here

## 2018-01-14 NOTE — ED NOTES
Patient complaining that he needs more pain medicine than I just gave him and again asking for medicine to knock him out.

## 2018-01-14 NOTE — CONSULTS
Patient admitted to the ICU with question respiratory compromise. Patient seen and examined. His chief complaint is leg pain, also has chronic pain on methadone. Reported sore throat in urgent care. He denies SOB. No stridor. No facial swelling. Swallows pills without issue.     Afebrile, Sat 98% room air, RR 18, P110, /90  In obvious pain from legs, just medicated  No respiratory distress  Airway patent, class 2  Lungs clear  abd soft  CV:tachy, regular  Legs: bilateral chronic appearing wounds    No CXR or neck xray    WBC: 10.9  Cr 0.89    Impression:  Admitted with leg pain/wounds  No evidence of respiratory compromise    Plan:  OK for medical management of pain/wounds on medical augustin  Please call if questions

## 2018-01-14 NOTE — PROGRESS NOTES
[facility] Dosing Services: Antimicrobial Stewardship Progress Note    Consult for antibiotic dosing of Vancomycin by Dr. Sabi Garland  Pharmacist reviewed antibiotic appropriateness for 36year old , male  for indication of LE SSTI  Day of Therapy 1    Plan:  Vancomycin therapy:  Start Vancomycin therapy, with loading dose of 1.5 grams at [facility] ED  Follow with maintenance dose of 1.5 grams every 8 hours  Dose calculated to approximate a therapeutic trough of 10-15 mcg/mL. Last trough level / Plan for level: after 3rd dose  Pharmacy to follow daily and will make changes to dose and/or frequency based on clinical status. Serum Creatinine     Lab Results   Component Value Date/Time    Creatinine 0.89 01/14/2018 03:09 AM       Creatinine Clearance Estimated Creatinine Clearance: 119.9 mL/min (based on Cr of 0.89).      Temp   [unfilled]    WBC   Lab Results   Component Value Date/Time    WBC 10.9 01/14/2018 03:09 AM       H/H   Lab Results   Component Value Date/Time    HGB 13.0 01/14/2018 03:09 AM        Platelets   Lab Results   Component Value Date/Time    PLATELET 291 95/98/3204 03:09 AM          Pharmacist: [address]Mary Davila Contact information:

## 2018-01-14 NOTE — ED NOTES
TRANSFER - OUT REPORT:    Verbal report given to Myles Bowen RN (name) on Ashok Huynh  being transferred to [place] (unit) for routine progression of care       Report consisted of patients Situation, Background, Assessment and   Recommendations(SBAR). Information from the following report(s) SBAR, ED Summary, [facility] and Recent Results was reviewed with the receiving nurse. Lines:   Peripheral IV 01/14/18 Left Antecubital (Active)   Site Assessment Clean, dry, & intact 1/14/2018  3:05 AM   Phlebitis Assessment 0 1/14/2018  3:05 AM   Infiltration Assessment 0 1/14/2018  3:05 AM   Dressing Status Clean, dry, & intact 1/14/2018  3:05 AM   Dressing Type Transparent 1/14/2018  3:05 AM   Hub Color/Line Status Pink;Flushed 1/14/2018  3:05 AM   Action Taken Blood drawn 1/14/2018  3:05 AM       Peripheral IV 01/14/18 Right Antecubital (Active)   Site Assessment Clean, dry, & intact 1/14/2018  3:05 AM   Phlebitis Assessment 0 1/14/2018  3:05 AM   Infiltration Assessment 0 1/14/2018  3:05 AM   Dressing Status Clean, dry, & intact 1/14/2018  3:05 AM   Dressing Type Transparent 1/14/2018  3:05 AM   Hub Color/Line Status Pink;Flushed 1/14/2018  3:05 AM   Action Taken Blood drawn 1/14/2018  3:05 AM        Opportunity for questions and clarification was provided. Patient transported with:   IV antibiotics    At time of report all labs have not resulted and imaging has not been completed. Will call and update receiving RN when results are received prior to transport.

## 2018-01-14 NOTE — ED NOTES
Patient complaining of \"tonsils swelling and going down my throat\" while drinking water.  Dr Alva Arteaga notified

## 2018-01-14 NOTE — Clinical Note
Patient Class[de-identified] Observation [180] Type of Bed: Medical [8] Reason for Observation: Chronic venous stasis ulcers Admitting Diagnosis: Chronic cutaneous venous stasis ulcer ([facility] [[id number]] Admitting Physician: Wolfgang Owen [[id number]] Attending Physician: Wolfgang Owen [[id number]]

## 2018-01-14 NOTE — ED NOTES
Dr Chris Stoner notified that the patient does not want anything done except to get pain medication.

## 2018-01-14 NOTE — ED NOTES
Patient refusing to have blood pressure cuff on, Patient also refusing to have cardiac monitor applied, also wants to remove pulse ox \"because it is bothering me\" but convinced to leave that on since we are monitoring him for a possible airway problem, given another bottle of water as requested.  My throat does not feel any different but my legs are still killing me

## 2018-01-15 VITALS
SYSTOLIC BLOOD PRESSURE: 122 MMHG | BODY MASS INDEX: 31.83 KG/M2 | WEIGHT: 210 LBS | RESPIRATION RATE: 16 BRPM | HEIGHT: 68 IN | HEART RATE: 88 BPM | TEMPERATURE: 98.1 F | OXYGEN SATURATION: 98 % | DIASTOLIC BLOOD PRESSURE: 71 MMHG

## 2018-01-15 LAB
ANION GAP SERPL CALC-SCNC: 8 MMOL/L (ref 5–15)
BACTERIA SPEC CULT: NORMAL
BACTERIA SPEC CULT: NORMAL
BASOPHILS # BLD: 0 K/UL (ref 0–0.1)
BASOPHILS NFR BLD: 0 % (ref 0–1)
BUN SERPL-MCNC: 17 MG/DL (ref 6–20)
BUN/CREAT SERPL: 19 (ref 12–20)
CALCIUM SERPL-MCNC: 8.7 MG/DL (ref 8.5–10.1)
CHLORIDE SERPL-SCNC: 103 MMOL/L (ref 97–108)
CO2 SERPL-SCNC: 28 MMOL/L (ref 21–32)
CREAT SERPL-MCNC: 0.91 MG/DL (ref 0.7–1.3)
DATE LAST DOSE: ABNORMAL
EOSINOPHIL # BLD: 0 K/UL (ref 0–0.4)
EOSINOPHIL NFR BLD: 0 % (ref 0–7)
ERYTHROCYTE [DISTWIDTH] IN BLOOD BY AUTOMATED COUNT: 12.9 % (ref 11.5–14.5)
GLUCOSE SERPL-MCNC: 141 MG/DL (ref 65–100)
HCT VFR BLD AUTO: 33.4 % (ref 36.6–50.3)
HGB BLD-MCNC: 11.2 G/DL (ref 12.1–17)
LYMPHOCYTES # BLD: 1 K/UL (ref 0.8–3.5)
LYMPHOCYTES NFR BLD: 8 % (ref 12–49)
MAGNESIUM SERPL-MCNC: 1.9 MG/DL (ref 1.6–2.4)
MCH RBC QN AUTO: 27.5 PG (ref 26–34)
MCHC RBC AUTO-ENTMCNC: 33.5 G/DL (ref 30–36.5)
MCV RBC AUTO: 82.1 FL (ref 80–99)
MONOCYTES # BLD: 0.2 K/UL (ref 0–1)
MONOCYTES NFR BLD: 2 % (ref 5–13)
NEUTS SEG # BLD: 10.7 K/UL (ref 1.8–8)
NEUTS SEG NFR BLD: 90 % (ref 32–75)
PLATELET # BLD AUTO: 332 K/UL (ref 150–400)
POTASSIUM SERPL-SCNC: 4.3 MMOL/L (ref 3.5–5.1)
RBC # BLD AUTO: 4.07 M/UL (ref 4.1–5.7)
REPORTED DOSE,DOSE: ABNORMAL UNITS
REPORTED DOSE/TIME,TMG: 2000
SERVICE CMNT-IMP: NORMAL
SODIUM SERPL-SCNC: 139 MMOL/L (ref 136–145)
VANCOMYCIN TROUGH SERPL-MCNC: 13.8 UG/ML (ref 5–10)
WBC # BLD AUTO: 11.9 K/UL (ref 4.1–11.1)

## 2018-01-15 PROCEDURE — 99218 HC RM OBSERVATION: CPT

## 2018-01-15 PROCEDURE — 80048 BASIC METABOLIC PNL TOTAL CA: CPT | Performed by: INTERNAL MEDICINE

## 2018-01-15 PROCEDURE — 74011250637 HC RX REV CODE- 250/637: Performed by: INTERNAL MEDICINE

## 2018-01-15 PROCEDURE — 97535 SELF CARE MNGMENT TRAINING: CPT

## 2018-01-15 PROCEDURE — 76450000000

## 2018-01-15 PROCEDURE — 74011250636 HC RX REV CODE- 250/636: Performed by: INTERNAL MEDICINE

## 2018-01-15 PROCEDURE — 36415 COLL VENOUS BLD VENIPUNCTURE: CPT | Performed by: INTERNAL MEDICINE

## 2018-01-15 PROCEDURE — 85025 COMPLETE CBC W/AUTO DIFF WBC: CPT | Performed by: INTERNAL MEDICINE

## 2018-01-15 PROCEDURE — 77030036687 HC SHOE PSTOP S2SG -A

## 2018-01-15 PROCEDURE — 97530 THERAPEUTIC ACTIVITIES: CPT

## 2018-01-15 PROCEDURE — 83735 ASSAY OF MAGNESIUM: CPT | Performed by: INTERNAL MEDICINE

## 2018-01-15 PROCEDURE — 80202 ASSAY OF VANCOMYCIN: CPT | Performed by: INTERNAL MEDICINE

## 2018-01-15 PROCEDURE — 77030018836 HC SOL IRR NACL ICUM -A

## 2018-01-15 PROCEDURE — 97165 OT EVAL LOW COMPLEX 30 MIN: CPT

## 2018-01-15 PROCEDURE — 97161 PT EVAL LOW COMPLEX 20 MIN: CPT

## 2018-01-15 PROCEDURE — 74011000250 HC RX REV CODE- 250: Performed by: INTERNAL MEDICINE

## 2018-01-15 RX ORDER — METHADONE HYDROCHLORIDE 10 MG/1
130 TABLET ORAL DAILY
Qty: 39 TAB | Refills: 0 | Status: SHIPPED | OUTPATIENT
Start: 2018-01-16 | End: 2018-01-19

## 2018-01-15 RX ADMIN — TRAMADOL HYDROCHLORIDE 100 MG: 50 TABLET, FILM COATED ORAL at 06:28

## 2018-01-15 RX ADMIN — FAMOTIDINE 20 MG: 10 INJECTION, SOLUTION INTRAVENOUS at 08:11

## 2018-01-15 RX ADMIN — OXYCODONE HYDROCHLORIDE 20 MG: 5 TABLET ORAL at 02:55

## 2018-01-15 RX ADMIN — TRAMADOL HYDROCHLORIDE 100 MG: 50 TABLET, FILM COATED ORAL at 00:21

## 2018-01-15 RX ADMIN — DOCUSATE SODIUM AND SENNOSIDES 1 TABLET: 8.6; 5 TABLET, FILM COATED ORAL at 08:11

## 2018-01-15 RX ADMIN — VANCOMYCIN HYDROCHLORIDE 1500 MG: 10 INJECTION, POWDER, LYOPHILIZED, FOR SOLUTION INTRAVENOUS at 12:01

## 2018-01-15 RX ADMIN — Medication 10 ML: at 05:01

## 2018-01-15 RX ADMIN — OXYCODONE HYDROCHLORIDE 20 MG: 5 TABLET ORAL at 07:39

## 2018-01-15 RX ADMIN — KETOROLAC TROMETHAMINE 30 MG: 30 INJECTION, SOLUTION INTRAMUSCULAR at 05:00

## 2018-01-15 RX ADMIN — DEXAMETHASONE SODIUM PHOSPHATE 10 MG: 10 INJECTION, SOLUTION INTRAMUSCULAR; INTRAVENOUS at 12:01

## 2018-01-15 RX ADMIN — METHADONE HYDROCHLORIDE 130 MG: 10 TABLET ORAL at 08:11

## 2018-01-15 RX ADMIN — DEXAMETHASONE SODIUM PHOSPHATE 10 MG: 10 INJECTION, SOLUTION INTRAMUSCULAR; INTRAVENOUS at 05:00

## 2018-01-15 RX ADMIN — VANCOMYCIN HYDROCHLORIDE 1500 MG: 10 INJECTION, POWDER, LYOPHILIZED, FOR SOLUTION INTRAVENOUS at 04:59

## 2018-01-15 RX ADMIN — HEPARIN SODIUM 5000 UNITS: 5000 INJECTION, SOLUTION INTRAVENOUS; SUBCUTANEOUS at 05:00

## 2018-01-15 NOTE — ROUTINE PROCESS
1/15/18   11:29AM  Called pt in his room about establishing PCP. Pt agreed to seeing provider at practice close to his home and prefers afternoon appt.  New pt PCP [facility] appt scheduled with Dr. Jess Souza on 1/23/18 at 4:00PM. Appt added to AVS. Melinda Apodaca CM Specialist

## 2018-01-15 NOTE — PROGRESS NOTES
I received notification from attending that pt is being discharged home today. I placed the number for the wound care clinic ([phone]) on pt.'s AVS. I attempted to make the appointment for pt but was placed on hold for a lengthy time so will discuss this with pt plus the order reads for pt to make his own appointment with the wound care center. Wound care orders given to pt to cleanse his legs bilaterally with soap & water,may shower. Pt is to apply xerofoam guaze to open wounds ,dry dressings and elevate legs as tolerated. Pt is to change his dressings daily. I discussed this with pt with his nurse present. Pt is in agreement with calling the wound care clinic and making an appointment. I encouraged pt to tell the wound care clinic that he is being discharged today so they could make make an appointment for pt as soon as possible. Friend will transport pt home. Plan for discharge discussed with pt ,wound care nurse,and pt.'s nurse. From a case management perspective,pt is okay for discharge. Thank you.   Alexis Carpenter  Team B with Dr Del Angel Query  [phone]

## 2018-01-15 NOTE — PROGRESS NOTES
Problem: Mobility Impaired (Adult and Pediatric)  Goal: *Therapy Goal (Edit Goal, Insert Text)  Physical Therapy Goals  Initiated 1/15/2018  1. Patient will move from supine to sit and sit to supine  in bed with modified independence within 4 day(s). 2.  Patient will transfer from bed to chair and chair to bed with modified independence using the least restrictive device within 4 day(s). 3.  Patient will perform sit to stand with modified independence within 4 day(s). 4.  Patient will ambulate with supervision/set-up for 50 feet with the least restrictive device within 4 day(s). 5.  Patient will ascend/descend 5 stairs with bilateral handrail(s) with supervision/set-up within 4 day(s). physical Therapy EVALUATION  Patient: Fazal Calixto (59 y.o. male)  Date: 1/15/2018  Primary Diagnosis: Chronic cutaneous venous stasis ulcer (HCC)  Chronic cutaneous venous stasis ulcer ([address]        Precautions: Fall       ASSESSMENT :  Based on the objective data described below, the patient presented to ER with c/o BLE pain 2/2 chronic stasis related wounds in bilateral feet and distal legs. Pt. Lives alone in a two story home with 5-steps to enter home with bilateral handrails. Pt's bed and bath is on first floor. Pt presents with chronic venous stasis bilateral leg wounds that are cracked and weeping. Pt has good sitting balance but very poor standing balance 2/2 decreased ankle ROM d/t fluid retention in ankle and calf and wound scabbing. Pt requires SBA/CGA for all bed mobility and CGA for stand pivot and bed to chair  transfers and up to RW. Did not attempt ambulation during this Rx d/t weeping wounds in BLEs. Pt currently displays poor standing balance and limited safe ambulation ability d/t bilateral leg wounds which requires AD and close assistance to prevent falls.  Pt will benefit with therapy for functional mobility training using an appropriate AD to increase safety and maximize functional independence. Patient will benefit from skilled intervention to address the above impairments. Patients rehabilitation potential is considered to be Fair  Factors which may influence rehabilitation potential include:   []         None noted  []         Mental ability/status  [x]         Medical condition  [x]         Home/family situation and support systems  [x]         Safety awareness  [x]         Pain tolerance/management  []         Other:      PLAN :  Recommendations and Planned Interventions:  []           Bed Mobility Training             []    Neuromuscular Re-Education  []           Transfer Training                   []    Orthotic/Prosthetic Training  []           Gait Training                         []    Modalities  []           Therapeutic Exercises           []    Edema Management/Control  []           Therapeutic Activities            []    Patient and Family Training/Education  []           Other (comment):    Frequency/Duration: Patient will be followed by physical therapy  3 times a week to address goals. Discharge Recommendations: Outpatient  Further Equipment Recommendations for Discharge: rolling walker     SUBJECTIVE:   Patient stated My feet and ankles are tight from the scabs and it makes it hard to walk.     OBJECTIVE DATA SUMMARY:   HISTORY:    History reviewed. No pertinent past medical history. Past Surgical History:   Procedure Laterality Date    HX CORONARY STENT PLACEMENT       Prior Level of Function/Home Situation: Pt reports he was independent with mobility, but limited activity.   Personal factors and/or comorbidities impacting plan of care:     Home Situation  Home Environment: Private residence  # Steps to Enter: 5  Rails to Enter: Yes  Hand Rails : Bilateral  Wheelchair Ramp: No  One/Two Story Residence: Two story, live on 1st floor  Lift Chair Available: No  Living Alone: Yes  Support Systems: Family member(s), Friends \ neighbors  Patient Expects to be Discharged to[de-identified] Private residence  Current DME Used/Available at Home: None  Tub or Shower Type: Tub/Shower combination    EXAMINATION/PRESENTATION/DECISION MAKING:   Critical Behavior:  Neurologic State: Alert, Appropriate for age  Orientation Level: Oriented X4  Cognition: Appropriate decision making, Appropriate for age attention/concentration     Hearing: Auditory  Auditory Impairment: None  Skin:  Bilateral distal half of legs and ankles with cracked, blisters, dry scaly and weeping chronic stasis wounds  Edema: No edema noted in Bilateral legs and ankles but tight bilateral calves and ankles 2/2 fluid retention. Range Of Motion:  AROM: Generally decreased, functional           PROM: Within functional limits except for bilateral ankles which are limited due to tightness from scabbing and fluid retention. Strength:    Strength: Generally decreased, functional                    Tone & Sensation:   Tone: Normal              Sensation: Intact               Coordination:  Coordination: Generally decreased, functional  Vision:      Functional Mobility:  Bed Mobility:  Rolling: Contact guard assistance  Supine to Sit: Contact guard assistance  Sit to Supine: Contact guard assistance  Scooting: Stand-by asssistance  Transfers:  Sit to Stand: Contact guard assistance  Stand to Sit: Stand-by asssistance  Stand Pivot Transfers: Contact guard assistance (Using RW 2/2 bilater foot distal leg wounds)  Stand Pivot Transfers: Contact guard assistance  Bed to Chair: Contact guard assistance (Using RW 2/2 bilater foot distal leg wounds)              Balance:   Sitting: Intact; Without support  Standing: Impaired; With support  Standing - Static: Occassional  Standing - Dynamic : Poor  Ambulation/Gait Training:  Distance (ft): 10 Feet (ft)  Assistive Device: Walker, rolling  Ambulation - Level of Assistance: Minimal assistance     Gait Description (WDL): Exceptions to WDL  Gait Abnormalities: Antalgic;Decreased step clearance;Shuffling gait        Base of Support: Narrowed  Stance: Left decreased;Right decreased  Speed/Cassandra: Shuffled; Slow  Step Length: Left shortened;Right shortened                     Stairs:      Not assessed during this Rx session. Therapeutic Exercises:     Not done during this Rx session. Functional Measure:  Barthel Index:    Bathin  Bladder: 10  Bowels: 10  Groomin  Dressing: 10  Feeding: 10  Mobility: 5  Stairs: 5  Toilet Use: 5  Transfer (Bed to Chair and Back): 10  Total: 75       Barthel and G-code impairment scale:  Percentage of impairment CH  0% CI  1-19% CJ  20-39% CK  40-59% CL  60-79% CM  80-99% CN  100%   Barthel Score 0-100 100 99-80 79-60 59-40 20-39 1-19   0   Barthel Score 0-20 20 17-19 13-16 9-12 5-8 1-4 0      The Barthel ADL Index: Guidelines  1. The index should be used as a record of what a patient does, not as a record of what a patient could do. 2. The main aim is to establish degree of independence from any help, physical or verbal, however minor and for whatever reason. 3. The need for supervision renders the patient not independent. 4. A patient's performance should be established using the best available evidence. Asking the patient, friends/relatives and nurses are the usual sources, but direct observation and common sense are also important. However direct testing is not needed. 5. Usually the patient's performance over the preceding 24-48 hours is important, but occasionally longer periods will be relevant. 6. Middle categories imply that the patient supplies over 50 per cent of the effort. 7. Use of aids to be independent is allowed. Ashton Canavan., Barthel, D.W. ([id number]). Functional evaluation: the Barthel Index. [address] [facility] (14)2. Niranjan Alarconet lisandro STACIA Mei, Yasmine Crystal., Ramírez Velazquez., Kirit, [address] ().  Measuring the change indisability after inpatient rehabilitation; comparison of the responsiveness of the Barthel Index and Functional [place] Measure. Journal of Neurology, Neurosurgery, and Psychiatry, 66(4), 937-610. LES Lockhart.A, SOREN Crawford, & Ada Tsai M.A. (2004.) Assessment of post-stroke quality of life in cost-effectiveness studies: The usefulness of the Barthel Index and the EuroQoL-5D. Quality of Life Research, 13, 369-77       G codes: In compliance with CMSs Claims Based Outcome Reporting, the following G-code set was chosen for this patient based on their primary functional limitation being treated: The outcome measure chosen to determine the severity of the functional limitation was the Barthel Index with a score of 75/100 which was correlated with the impairment scale. ? Mobility - Walking and Moving Around:     - CURRENT STATUS: CJ - 20%-39% impaired, limited or restricted    - GOAL STATUS: CI - 1%-19% impaired, limited or restricted    - D/C STATUS:  ---------------To be determined---------------      Physical Therapy Evaluation Charge Determination   History Examination Presentation Decision-Making   LOW Complexity : Zero comorbidities / personal factors that will impact the outcome / POC LOW Complexity : 1-2 Standardized tests and measures addressing body structure, function, activity limitation and / or participation in recreation  LOW Complexity : Stable, uncomplicated  LOW Complexity : FOTO score of       Based on the above components, the patient evaluation is determined to be of the following complexity level: LOW     Pain:  Pain Scale 1: Numeric (0 - 10)  Pain Intensity 1: 10  Pain Location 1: Leg  Pain Orientation 1: Left;Right  Pain Description 1: Throbbing; Sharp;Burning  Pain Intervention(s) 1: Medication (see MAR)  Activity Tolerance:   Pt limited in activity training 2/2 to weeping of bilateral LE leg and ankle wounds. Please refer to the flowsheet for vital signs taken during this treatment.   After treatment:   [x]         Patient left in no apparent distress sitting up in chair  []         Patient left in no apparent distress in bed  [x]         Call bell left within reach  [x]         Nursing notified  []         Caregiver present  []         Bed alarm activated    COMMUNICATION/EDUCATION:   The patients plan of care was discussed with: Registered Nurse. [x]         Fall prevention education was provided and the patient/caregiver indicated understanding. [x]         Patient/family have participated as able in goal setting and plan of care. [x]         Patient/family agree to work toward stated goals and plan of care. []         Patient understands intent and goals of therapy, but is neutral about his/her participation. []         Patient is unable to participate in goal setting and plan of care.     Thank you for this referral.  Betty Reyes, PT   Time Calculation: 18 mins

## 2018-01-15 NOTE — PROGRESS NOTES
Bedside shift change report given to Na Law (oncoming nurse) by Fiona Burton (offgoing nurse). Report included the following information SBAR, Kardex, Procedure Summary, MAR and Recent Results.             Primary Nurse Reyes Rowe RN and Maricel Franco RN performed a dual skin assessment on this patient Impairment noted- see wound doc flow sheet  Jeremiah score is 22

## 2018-01-15 NOTE — PROGRESS NOTES
Spiritual Care Assessment/Progress Notes    Gege Elliott [id number]  [id number]    1975  36 y.o.  male    Patient Telephone Number: [phone] (home)   Sabianist Affiliation: No Presybeterian   Language: English   Extended Emergency Contact Information  Primary Emergency Contact: Palomo Senior Phone: [phone]  Relation: Other Relative   Patient Active Problem List    Diagnosis Date Noted    Open leg wound 01/14/2018    Chronic cutaneous venous stasis ulcer ([address] 01/14/2018    HTN (hypertension) 01/14/2018    Chronic pain 01/14/2018        Date: 1/15/2018       Level of Sabianist/Spiritual Activity:  []         Involved in mason tradition/spiritual practice    []         Not involved in mason tradition/spiritual practice  [x]         Spiritually oriented    []         Claims no spiritual orientation    []         seeking spiritual identity  []         Feels alienated from Confucianism practice/tradition  []         Feels angry about Confucianism practice/tradition  [x]         Spirituality/Confucianism PRAYER IS a resource for coping at this time.   []         Not able to assess due to medical condition    Services Provided Today:  []         crisis intervention    []         reading Scriptures  [x]         spiritual assessment    [x]         prayer  [x]         empathic listening/emotional support  []         rites and rituals (cite in comments)  []         life review     []         Confucianism support  []         theological development   []         advocacy  []         ethical dialog     []         blessing  []         bereavement support    []         support to family  []         anticipatory grief support   []         help with AMD  []         spiritual guidance    []         meditation      Spiritual Care Needs  []         Emotional Support  []         Spiritual/Sabianist Care  []         Loss/Adjustment  []         Advocacy/Referral                /Ethics  []         No needs expressed at               this time  [x]         Other: (note in               comments)  Spiritual Care Plan  []         Follow up visits with               pt/family  []         Provide materials  []         Schedule sacraments  []         Contact Community               Clergy  [x]         Follow up as needed  []         Other: (note in               comments)     Comments: Initial spiritual assessment in [facility]. Mr. Tom Henry is being discharged. He was currently waiting for the volunteer to roll him out of the hospital.  He shared he has a [address] He shared his Ex wife is a big support for him. 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 He asked for prayer for continued healing. As we finished prayer as the volunteer arrived.   Visited by: Lauren Lara [address] [facility] [place] ([id number])

## 2018-01-15 NOTE — DISCHARGE INSTRUCTIONS
ACUTE DIAGNOSES:  Chronic cutaneous venous stasis ulcer (HCC)  Chronic cutaneous venous stasis ulcer ([facility]    CHRONIC MEDICAL DIAGNOSES:  Problem List as of 1/15/2018  Date Reviewed: 1/15/2018          Codes Class Noted - Resolved    Open leg wound ICD-10-CM: S81.809A  ICD-9-CM: 891.0  1/14/2018 - Present        Chronic cutaneous venous stasis ulcer ([facility] ICD-10-CM: I83.009  ICD-9-CM: 454.0  1/14/2018 - Present        HTN (hypertension) ICD-10-CM: I10  ICD-9-CM: 401.9  1/14/2018 - Present        Chronic pain ICD-10-CM: G89.29  ICD-9-CM: 338.29  1/14/2018 - Present        RESOLVED: Cellulitis ICD-10-CM: L03.90  ICD-9-CM: 682.9  1/14/2018 - 1/14/2018              DISCHARGE MEDICATIONS:          · It is important that you take the medication exactly as they are prescribed. · Keep your medication in the bottles provided by the pharmacist and keep a list of the medication names, dosages, and times to be taken in your wallet. · Do not take other medications without consulting your doctor. DIET:  Regular Diet    ACTIVITY: Activity as tolerated    ADDITIONAL INFORMATION: If you experience any of the following symptoms then please call your primary care physician or return to the emergency room if you cannot get hold of your doctor: Fever, chills, nausea, vomiting, diarrhea, change in mentation, falling, bleeding, shortness of breath. FOLLOW UP CARE:  Primary care physician. you are to call and set up an appointment to see them in 2 weeks. pain and wound clinic      Information obtained by :  I understand that if any problems occur once I am at home I am to contact my physician. I understand and acknowledge receipt of the instructions indicated above.                                                                                                                                            Physician's or R.N.'s Signature                                                                  Date/Time Patient or Representative Signature                                                          Date/Time

## 2018-01-15 NOTE — PROGRESS NOTES
Received 1 script and a copy of discharge instructions. Aware he is to set up and go to see PCP and call and set up appointment at pain clinic to be seen in 3 days.  Verbalized understanding

## 2018-01-15 NOTE — WOUND CARE
Wound care note- MD order to assess bilateral lower leg venous wounds, sitting up in chair- legs dependent, full thickness odorous circumferential lower leg and foot wounds - draining large amount of yellow serous drainage- feet on pad- open to air on arrival to room. Alert, no distress - premedication for pain prior to wound care. Assessment  Bilateral lower leg edema- full thickness circumferential venous wounds- yellow base, tan and yellow drainage- crusted edges, scattered dry black scabbed crusted areas on bilateral dorsal feet. States areas developed over 4 wks ago. Treatment  Wounds cleansed, xeroform to wounds, dry dressings, legs elevated  Education on wound and skin care, importance of elevation - verbalized understanding- needs reinforcement    Plan of care and wound care recommendations discussed with Dr Mer Mcqueen, orders obtained, home health referral and follow up in wound care clinic. Plans for discharge today.    Lizette Henriquez

## 2018-01-15 NOTE — H&P
[place] [id number] [facility], [address] [place] [address]  [phone]    Admission History and Physical      NAME:  Alan Bateman   :   1975   MRN:  [id number]     PCP:  None     Date/Time:  1/15/2018         Subjective:     CHIEF COMPLAINT: leg pain      HISTORY OF PRESENT ILLNESS:     Mr. Alannah Verma is a 36 y.o.  male who is admitted with BL leg wound/ pain. Mr. Alannah Verma with Hx of chronic leg pain presented to ER c/o severe bl leg pain. The pian is chronic, but worsened the last 3-4 weeks after he stopped taking methadone. Pt stated that he missed his regular visit at methadone clinic and for the last 5 days, his methadone was discontinued. Denies fever or SOB. The pain is sharp, severe in intensity on BL legs, worse with movement. History reviewed. No pertinent past medical history. Past Surgical History:   Procedure Laterality Date    HX CORONARY STENT PLACEMENT         Social History   Substance Use Topics    Smoking status: Current Every Day Smoker     Packs/day: 1.00    Smokeless tobacco: Never Used    Alcohol use No        Family History   Problem Relation Age of Onset    Family history unknown: Yes        Allergies   Allergen Reactions    Pcn [Penicillins] Swelling        Prior to Admission medications    Medication Sig Start Date End Date Taking? Authorizing Provider   naproxen sodium (ALEVE) 220 mg cap Take 8 Caps by mouth as needed. Yes Phys Vickey, MD   METHADONE HCL (METHADONE, BULK,) 130 mg by Does Not Apply route daily. Yes Phys MD Vickey   ibuprofen (MOTRIN) 600 mg tablet Take 1 Tab by mouth every six (6) hours as needed for Pain.    BRYSON Stein   HYDROcodone-acetaminophen (NORCO) 5-325 mg per tablet Take 1 Tab by mouth every four (4) hours as needed for Pain.     BRYSON Stein         Review of Systems:  (bold if positive, if negative)    Gen:  Eyes:  ENT:  CVS:  Pulm:  GI:    :    MS:  Pain,Skin:  Psych: Endo:    Hem:  Renal:    Neuro:            Objective:      VITALS:    Vital signs reviewed; most recent are:    Visit Vitals    /82 (BP 1 Location: Right arm, BP Patient Position: At rest;Sitting)    Pulse 75    Temp 98.3 °F (36.8 °C)    Resp 16    Ht 5' 8\" (1.727 m)    Wt 95.3 kg (210 lb)    SpO2 97%    BMI 31.93 kg/m2     SpO2 Readings from Last 6 Encounters:   01/15/18 97%   06/25/13 93%        No intake or output data in the 24 hours ending 01/15/18 1049         Exam:     Physical Exam:    Gen:  Well-developed, well-nourished, in no acute distress  HEENT:  Pink conjunctivae, PERRL, hearing intact to voice, moist mucous membranes  Neck:  Supple, without masses, thyroid non-tender  Resp:  No accessory muscle use, clear breath sounds without wheezes rales or rhonchi  Card:  No murmurs, normal S1, S2 without thrills, bruits or peripheral edema  Abd:  Soft, non-tender, non-distended, normoactive bowel sounds are present, no palpable organomegaly and no detectable hernias  Lymph:  No cervical or inguinal adenopathy  Musc:  No cyanosis or clubbing  Skin:  Chronic bL leg wound. Neuro:  Cranial nerves are grossly intact, no focal motor weakness, follows commands appropriately  Psych:  Good insight, oriented to person, place and time, alert       Labs:    Recent Labs      01/15/18   0445   WBC  11.9*   HGB  11.2*   HCT  33.4*   PLT  332     Recent Labs      01/15/18   0445  01/14/18   0309   NA  139  141   K  4.3  3.5   CL  103  103   CO2  28  28   GLU  141*  129*   BUN  17  19   CREA  0.91  0.89   CA  8.7  9.1   MG  1.9  1.7   ALB   --   3.2*   TBILI   --   0.7   SGOT   --   23   ALT   --   30     No results found for: GLUCPOC  No results for input(s): PH, PCO2, PO2, HCO3, FIO2 in the last 72 hours. No results for input(s): INR in the last 72 hours. No lab exists for component: INREXT, INREXT    Telemetry reviewed:          Assessment/Plan:    1.   Chronic pain (1/14/2018)/ Open leg wound (1/14/2018)/ Chronic cutaneous venous stasis ulcer ([facility] (1/14/2018). Wound care. Pain mangment with methadone and oxycodone. Will give pt few days of methadone until he re established into pain management clinic. Evaluated by wound care specialist and recommended to be seen by wound care nurse and then FU in wound clinic. Evaluated by vascular surgery     2. Elevated BP. No Hx of HTN. This is likely secondary to severe pain. BP stable     3. Uvula swelling/ pain/ redness. Symptomatic treatment with Cepacol.           Previous medical records reviewed     Risk of deterioration: high      Total time spent with patient: 79 [address] [facility] discussed with: Patient, Nursing Staff and >50% of time spent in counseling and coordination of care    Discussed:  Care Plan    Prophylaxis:  Lovenox    Probable Disposition:  Home w/Family           ___________________________________________________    Attending Physician: Marciano Echeverria MD

## 2018-01-15 NOTE — PROGRESS NOTES
Occupational Therapy EVALUATION/discharge  Patient: Violetta Feliciano (1 y.o. male)  Date: 1/15/2018  Primary Diagnosis: Chronic cutaneous venous stasis ulcer ([address]  Chronic cutaneous venous stasis ulcer ([address]        Precautions: universal       ASSESSMENT:   Based on the objective data described below, the patient presents with good overall activity tolerance following admission on 1/14 for LE swelling + venous stasis ulcer. Patient lives alone but has arranged for his friend to stay with him to assist as needed at discharge. Patient was independent with all care and had no limitations PTA. Patient issued cast shoes per wound care RN recommendation; Noted improved stability with ambulation when wearing cast shoes. Patient was educated on pacing and energy conservation techniques, adaptive ADL techniques, and safe transfer techniques. Patient was received up and required supervision for OOB transfers. Patient was independent with UB ADLs and required supervision for LB ADLs. Patient has no further skilled OT needs and is cleared from OT services at this time. Further skilled acute occupational therapy is not indicated at this time. Discharge Recommendations: None for OT   Further Equipment Recommendations for Discharge: none       SUBJECTIVE:   Patient stated \"These are great; I feel like I can walk better actually.     OBJECTIVE DATA SUMMARY:   HISTORY:   History reviewed. No pertinent past medical history. Past Surgical History:   Procedure Laterality Date    HX CORONARY STENT PLACEMENT         Prior Level of Function/Environment/Context: Patient lives alone. See assessment section for PLOF information.       Home Situation  Home Environment: Private residence  # Steps to Enter: 5  Rails to Enter: Yes  Hand Rails : Bilateral  Wheelchair Ramp: No  One/Two Story Residence: Two story, live on 1st floor  Lift Chair Available: No  Living Alone: Yes  Support Systems: Family member(s), Friends \ neighbors  Patient Expects to be Discharged to[de-identified] Private residence  Current DME Used/Available at Home: None  Tub or Shower Type: Tub/Shower combination  [x]  Right hand dominant   []  Left hand dominant    EXAMINATION OF PERFORMANCE DEFICITS:  Cognitive/Behavioral Status:  Neurologic State: Alert  Orientation Level: Oriented X4  Cognition: Appropriate decision making; Appropriate for age attention/concentration; Appropriate safety awareness  Perception: Appears intact  Perseveration: No perseveration noted  Safety/Judgement: Awareness of environment    Skin: Intact in the uppers    Edema: None noted in the uppers    Hearing: Auditory  Auditory Impairment: None    Vision/Perceptual:    Tracking: Able to track stimulus in all quadrants w/o difficulty    Diplopia: No    Acuity: Within Defined Limits       Range of Motion:  WDL in the uppers       Strength:  WDL in the uppers    Coordination:  Fine Motor Skills-Upper: Left Intact; Right Intact    Gross Motor Skills-Upper: Left Intact; Right Intact    Tone & Sensation:  Tone: Normal  Sensation: Intact    Balance:  Sitting: Intact  Standing: Intact; With support  Standing - Static: Occassional  Standing - Dynamic : Poor    Functional Mobility and Transfers for ADLs:  Bed Mobility:  Rolling:  (pt was received up and remained up)  Scooting: Independent    Transfers:  Sit to Stand: Supervision  Stand to Sit: Supervision  Bed to Chair: Supervision  Toilet Transfer : Supervision    ADL Assessment:  Feeding: Independent    Oral Facial Hygiene/Grooming: Independent    Bathing: Supervision    Upper Body Dressing: Independent    Lower Body Dressing: Supervision    Toileting: Supervision     Cognitive Retraining  Safety/Judgement: Awareness of environment    Therapeutic Exercise:  Patient educated on the benefits of exercise and encouraged to complete frequently throughout the day as tolerated.   Instruction provided for the following exercises: shoulder shrugs, shoulder flexion/extension, chest press/back row, elbow flexion/extension, wrist flexion/extension, finger flexion/extension. Patient tolerated 1 sets x 10 reps without complaint and agreeable to complete at minimum 3x/day. Functional Measure:  Barthel Index:    Bathin  Bladder: 10  Bowels: 10  Groomin  Dressing: 10  Feeding: 10  Mobility: 10  Stairs: 5  Toilet Use: 10  Transfer (Bed to Chair and Back): 15  Total: 90       Barthel and G-code impairment scale:  Percentage of impairment CH  0% CI  1-19% CJ  20-39% CK  40-59% CL  60-79% CM  80-99% CN  100%   Barthel Score 0-100 100 99-80 79-60 59-40 20-39 1-19   0   Barthel Score 0-20 20 17-19 13-16 9-12 5-8 1-4 0      The Barthel ADL Index: Guidelines  1. The index should be used as a record of what a patient does, not as a record of what a patient could do. 2. The main aim is to establish degree of independence from any help, physical or verbal, however minor and for whatever reason. 3. The need for supervision renders the patient not independent. 4. A patient's performance should be established using the best available evidence. Asking the patient, friends/relatives and nurses are the usual sources, but direct observation and common sense are also important. However direct testing is not needed. 5. Usually the patient's performance over the preceding 24-48 hours is important, but occasionally longer periods will be relevant. 6. Middle categories imply that the patient supplies over 50 per cent of the effort. 7. Use of aids to be independent is allowed. Maya Diamond., Barthel, D.W. (0122). Functional evaluation: the Barthel Index. [address] [facility] (14)2. Norval Low lisandro [place], J.J.M.F, Felton Champagne., Les Huffman.Kirit, [address] (). Measuring the change indisability after inpatient rehabilitation; comparison of the responsiveness of the Barthel Index and Functional [place] Measure.  Journal of Neurology, Neurosurgery, and Psychiatry, 66(4), [phone]. PACO Dubon, SOREN Crawford, & Sharon Quispe M.A. (2004.) Assessment of post-stroke quality of life in cost-effectiveness studies: The usefulness of the Barthel Index and the EuroQoL-5D. Quality of Life Research, 13, 275-27       G codes: In compliance with CMSs Claims Based Outcome Reporting, the following G-code set was chosen for this patient based on their primary functional limitation being treated: The outcome measure chosen to determine the severity of the functional limitation was the Barthel Index with a score of 90/100 which was correlated with the impairment scale. ? Self Care:     - CURRENT STATUS: CI - 1%-19% impaired, limited or restricted    - GOAL STATUS: CI - 1%-19% impaired, limited or restricted    - D/C STATUS:  CI - 1%-19% impaired, limited or restricted     Occupational Therapy Evaluation Charge Determination   History Examination Decision-Making   LOW Complexity : Brief history review  LOW Complexity : 1-3 performance deficits relating to physical, cognitive , or psychosocial skils that result in activity limitations and / or participation restrictions  LOW Complexity : No comorbidities that affect functional and no verbal or physical assistance needed to complete eval tasks       Based on the above components, the patient evaluation is determined to be of the following complexity level: LOW   Activity Tolerance:   Patient tolerated eval well. Please refer to the flowsheet for vital signs taken during this treatment. After treatment:   [x]  Patient left in no apparent distress sitting up in chair  []  Patient left in no apparent distress in bed  [x]  Call bell left within reach  [x]  Nursing notified  []  Caregiver present  []  Bed alarm activated    COMMUNICATION/EDUCATION:   Communication/Collaboration:  [x]      Home safety education was provided and the patient/caregiver indicated understanding.   [x]      Patient/family have participated as able and agree with findings and recommendations. []      Patient is unable to participate in plan of care at this time. Findings and recommendations were discussed with: Physical Therapist, Registered Nurse and patient.     Winifred Tian OTR/L  Time Calculation: 38 mins

## 2018-01-16 NOTE — DISCHARGE SUMMARY
Hospitalist Discharge Summary     Patient ID:    Violetta Feliciano  [id number]  20 y.o.  1975    Admit date: 1/14/2018    Discharge date and time: 1/16/2018    Admission Diagnoses: Chronic cutaneous venous stasis ulcer ([facility]  Chronic cutaneous venous stasis ulcer ([facility]    Chronic Diagnoses:    Problem List as of 1/15/2018  Date Reviewed: 1/15/2018          Codes Class Noted - Resolved    Open leg wound ICD-10-CM: S81.809A  ICD-9-CM: 891.0  1/14/2018 - Present        Chronic cutaneous venous stasis ulcer ([facility] ICD-10-CM: I83.009  ICD-9-CM: 454.0  1/14/2018 - Present        HTN (hypertension) ICD-10-CM: I10  ICD-9-CM: 401.9  1/14/2018 - Present        Chronic pain ICD-10-CM: G89.29  ICD-9-CM: 338.29  1/14/2018 - Present        RESOLVED: Cellulitis ICD-10-CM: L03.90  ICD-9-CM: 682.9  1/14/2018 - 1/14/2018              Discharge Medications: Cannot display discharge medications since this patient is not currently admitted. Follow up Care:    1. None in 1-2 weeks  2. Diet:  Regular Diet    Disposition:  Home. Advanced Directive:    Discharge Exam:  See today's note. CONSULTATIONS: Palliative Care    Significant Diagnostic Studies:   Recent Labs      01/15/18   0445  01/14/18   0309   WBC  11.9*  10.9   HGB  11.2*  13.0   HCT  33.4*  38.9   PLT  332  449*     Recent Labs      01/15/18   0445  01/14/18   0309   NA  139  141   K  4.3  3.5   CL  103  103   CO2  28  28   BUN  17  19   CREA  0.91  0.89   GLU  141*  129*   CA  8.7  9.1   MG  1.9  1.7     Recent Labs      01/14/18   0309   SGOT  23   ALT  30   AP  84   TBILI  0.7   TP  7.2   ALB  3.2*   GLOB  4.0     No results for input(s): INR, PTP, APTT in the last 72 hours. No lab exists for component: INREXT   No results for input(s): FE, TIBC, PSAT, FERR in the last 72 hours. No results for input(s): PH, PCO2, PO2 in the last 72 hours. No results for input(s): CPK, CKMB in the last 72 hours.     No lab exists for component: TROPONINI  No results found for: [place] [address]:   1. Chronic pain (1/14/2018)/ Open leg wound (1/14/2018)/ Chronic [facility] st[facility] ([facility] (1/14/2018). Wound care. Pain mangment with methadone and oxycodone. Will give pt few days of methadone until he re established into pain management clinic. Evaluated by wound care specialist and recommended to be seen by wound care nurse and then FU in wound clinic. Evaluated by vascular surgery      2. Elevated BP. No Hx of HTN. This is likely secondary to severe pain. BP stable      3. Uvula swelling/ pain/ redness. Symptomatic treatment with Cepacol.      Discharged in improved condition          Signed:  Caridad George MD  1/16/2018  8:33 AM

## 2018-01-16 NOTE — PROGRESS NOTES
2045: Patient called late this afternoon stating that the pharmacy he was trying to fill his medicine at needed \"an authorization\". He was unclear in his explanation, but left a phone number that \"the doctor was supposed to call to authorize the medication\". He called several times this afternoon as staff was trying to figure out what was needed. Patient spoke with a [address] [facility], the charge nurse, and this RN. This RN spoke with Dr. Jhoan Peterson and gave him the number the patient requested the doctor call. After shift change the patient called again asking \"someone from the doctors office to call the insurance company\". This RN called the insurance company who stated that needed a prior authorization in order to cover the cost of the patient's methadone. I informed the charge nurse of this. She and I both spoke to the on-call MD Dr. Becky Enamorado who said that the form could be filled out by Dr. Liliane Recinos in the morning. Per Dr. Becky Enamorado the pharmacy (where the patient was trying to fill the prescription) needed to call the Hospitalist office in the morning to complete the prior authorization. I called the patient to relay this information. Patient was frustrated, but said that he understood the plan (that the pharmacy was to call the Hospitalist office in the morning). Charge nurse informed of the situation.

## 2018-01-17 LAB
BACTERIA SPEC CULT: NORMAL
SERVICE CMNT-IMP: NORMAL

## 2018-01-18 ENCOUNTER — HOSPITAL ENCOUNTER (OUTPATIENT)
Dept: WOUND CARE | Age: 43
Discharge: HOME | End: 2018-01-18
Payer: MEDICAID

## 2018-01-18 PROCEDURE — 99215 OFFICE O/P EST HI 40 MIN: CPT

## 2018-01-18 RX ORDER — LIDOCAINE HYDROCHLORIDE 20 MG/ML
JELLY TOPICAL ONCE
Status: COMPLETED | OUTPATIENT
Start: 2018-01-18 | End: 2018-01-18

## 2018-01-18 RX ADMIN — LIDOCAINE HYDROCHLORIDE: 20 JELLY TOPICAL at 10:22

## 2018-01-18 NOTE — PROGRESS NOTES
Wound Center  Initial Consult Note          Chief Complaint:  Cristiano Benton is a 36 y.o.  male who presents with bilalteral lower extremity wounds of few months duration. Referred by:  Hosp    HPI:   Unclear history  Prone to leg swelling; this time swelling did not go away; developed wounds. [facility]- negative  No vascular studies done  Seen by vascular - Dr Garcia Mail- dx with venous insuff- compression recommended    Does not have PCP    Wound caused by: swelling  Current wound care:? Offloading wound: no  Elevating legs: no  Compression: no  Appetite: good  Wound associated pain:5-10/10; on metadone  Diabetic: no as per patient  Smoker: 1ppd since teen years  ROS: no N/V, no T/chills; no local rash, h/o heart disease- stent last year  No past medical history on file. Past Surgical History:   Procedure Laterality Date    HX CORONARY STENT PLACEMENT       Family History   Problem Relation Age of Onset    Family history unknown: Yes    Cancer: Yes - Paternal Grandparents, Maternal Grandparents; Diabetes: No; Heart Disease: Yes - Maternal Grandparents, Mother; Hereditary Spherocytosis: No; Hypertension: Yes - Maternal Grandparents, Mother; Kidney Disease: No; Lung Disease: No; Seizures: No; Stroke: No; Thyroid Problems: No; Tuberculosis: No  Social History   Substance Use Topics    Smoking status: Current Every Day Smoker     Packs/day: 1.00    Smokeless tobacco: Never Used    Alcohol use No       Prior to Admission medications    Medication Sig Start Date End Date Taking? Authorizing Provider   methadone (DOLOPHINE) 10 mg tablet Take 13 Tabs by mouth daily for 3 days. Max Daily Amount: 130 mg. 1/16/18 1/19/18  Jalil Trevino MD   naproxen sodium (ALEVE) 220 mg cap Take 8 Caps by mouth as needed. Gabriella Hurd MD   ibuprofen (MOTRIN) 600 mg tablet Take 1 Tab by mouth every six (6) hours as needed for Pain.  5/44/13   BRYSON Keller   HYDROcodone-acetaminophen (NORCO) 5-325 mg per tablet Take 1 Tab by mouth every four (4) hours as needed for Pain. 8/38/51   EdBRYSON De La O     Allergies   Allergen Reactions    Pcn [Penicillins] Swelling        Review of Systems:  A comprehensive review of systems was negative except for that written in the History of Present Illness. Also refer to [facility] notes. Objective:     Physical Exam:   Temperature (°F): 97.8   Pulse (bpm): 93   Respiratory Rate (breaths/min): 18   Blood Pressure (mmHg): 134/85   General: well developed,obese, NAD. Hygiene good  Psych: cooperative. No anxiety or depression. Normal mood and affect. Neuro: alert and oriented to person/place/situation. Otherwise nonfocal.  Derm: Normal  turgor for age, dry skin  HEENT: Normocephalic, atraumatic. EOMI. Conjunctiva clear. No scleral icterus. Neck: Normal range of motion. No masses. Chest: Good air entry bilaterally. Respirations nonlabored  Cardio[de-identified] Normal heart sounds,no rubs, murmurs or gallops  Abdomen: Soft, nontender, nondistended, normoactive bowel sounds  Lower extremities: color normal; temperature normal. Hair growth is present. Calves are supple, nontender, approximately equally sized in comparison.  Capillary refill <3 sec  Focussed Lower Extremity Exam:  Vascular exam:  Right lower extremity: moderate  edema, foot warm, mostly reddened leg, not warm  DP pulse : 1+  PT pulse: 1+  Nails dystrophic   Left lower extremity: moderate  edema, foot warm, mostly reddened leg, not warm  DP pulse : 1+  PT pulse: 1+   Nails dystrophic    Ulcer Description:   Etiology: venous  Location: left foot, dorsum  Measurement: 1.4x2x0.1 cm  Ulcer bed: eschar / full thickness  Periwound: Reddened  Exudate: Scant/small amount Serosanguinous exudate    Etiology: venous  Location: left 1st/ 2nd toe web space, dorsum  Measurement: 2x1x0.1 cm  Ulcer bed: eschar / full thickness  Periwound: Reddened  Exudate: Scant/small amount Serosanguinous exudate    Etiology: venous  Location: left leg circumferential  Measurement: 26a94d7.1 cm  Ulcer bed: eschar/ necrotic / full thickness  Periwound: Reddened  Exudate: Scant/small amount Serosanguinous exudate    Etiology: venous  Location: right foot, dorsum, distal  Measurement: 2x3.6x0.1 cm  Ulcer bed: eschar / full thickness  Periwound: Reddened  Exudate: Scant/small amount Serosanguinous exudate    Etiology: venous  Location: right foot, dorsum,   Measurement: 1x1.3x0.2 cm  Ulcer bed: necrotic / full thickness  Periwound: Reddened  Exudate: Scant/small amount Serosanguinous exudate    Etiology: venous  Location: right leg circumferential  Measurement: 28q09t0.1 cm  Ulcer bed: eschar/ necrotic / full thickness  Periwound: Reddened  Exudate: Scant/small amount Serosanguinous exudate    Data Review:   No results found for this or any previous visit (from the past 24 hour(s)). Assessment:     36 y.o. male with venous ulcers-  Bilateral lower ext/feet    Refused ABIs/dopplers here    ++ pain    Wounds primarily necrotic/eschar    Legs red- chronic vs acute infection? - right leg culture done      Needs :  Serial debridement- not debrided today- get pain meds from PCP or premedicate with OTC pain meds  Good local wound care- discussed in detail, see plan below  Edema management- discussed in detail, see plan below  Nutrition optimization  Smoking cessation - counseling done today  Vascular work up -order arterial and vascular tests  Plan:     Dressing: wound gel / ABD pad  Frequency: three times a week  Order supplies    Remove dressing prior to showering  Do not get dressing wet    Edema management:  Elevate leg(s) throughout the day starting in the morning  Compression: Tubigrip x 2 layer for now  Avoid prolonged standing    Role of debridement discussed for wound healing- will debrided next visit    Pain management: will get PCP , OTC pain meds in meantime    Patient understood and agrees with plan. Questions answered.     Weekly visits and serial debridements also discussed.   Follow up with me in 1-2 week  >38 min spent with patient (>50% in counseling / coordination of care    Signed By: Theodore Parkinson MD     January 18, 2018

## 2018-01-20 LAB
BACTERIA SPEC CULT: NORMAL
SERVICE CMNT-IMP: NORMAL

## 2018-01-23 ENCOUNTER — HOSPITAL ENCOUNTER (EMERGENCY)
Age: 43
Discharge: HOME | End: 2018-01-23
Attending: EMERGENCY MEDICINE
Payer: MEDICAID

## 2018-01-23 VITALS
DIASTOLIC BLOOD PRESSURE: 88 MMHG | OXYGEN SATURATION: 98 % | RESPIRATION RATE: 18 BRPM | BODY MASS INDEX: 34.1 KG/M2 | WEIGHT: 225 LBS | HEART RATE: 101 BPM | SYSTOLIC BLOOD PRESSURE: 126 MMHG | HEIGHT: 68 IN

## 2018-01-23 DIAGNOSIS — G89.29 OTHER CHRONIC PAIN: ICD-10-CM

## 2018-01-23 DIAGNOSIS — M79.605 BILATERAL LEG PAIN: ICD-10-CM

## 2018-01-23 DIAGNOSIS — M79.604 BILATERAL LEG PAIN: ICD-10-CM

## 2018-01-23 DIAGNOSIS — M79.89 LEG SWELLING: Primary | ICD-10-CM

## 2018-01-23 PROCEDURE — 99282 EMERGENCY DEPT VISIT SF MDM: CPT

## 2018-01-23 RX ORDER — METHADONE HYDROCHLORIDE 10 MG/1
110 TABLET ORAL DAILY
Status: ON HOLD | COMMUNITY
End: 2018-04-16

## 2018-01-23 RX ORDER — KETOROLAC TROMETHAMINE 30 MG/ML
60 INJECTION, SOLUTION INTRAMUSCULAR; INTRAVENOUS
Status: DISCONTINUED | OUTPATIENT
Start: 2018-01-23 | End: 2018-01-23 | Stop reason: HOSPADM

## 2018-01-23 NOTE — ED PROVIDER NOTES
HPI Comments: Patient states that he was recently admitted to [facility] for his wounds and lower extremity swelling. Patient states that he was then discharged home. Patient states that he was seen in the wound care clinic last Thursday. Patient states that he was supposed to change his dressings and Saturday. However, this was not a rub in time. Patient states that he has not been able to change his dressings. Patient states that he is to see wound care clinic again on Thursday. Patient states that there trying to work on getting this plus to him he needed to change the dressings. Patient states that he missed his methadone the past 2 days. Patient states that it was due to cardiac shoes. Patient states that tonight the pain got so bad he came here. Patient states that he usually gets his methadone in the [facility] area between 6:30 and 7:30 AM.    Patient is a 36 y.o. male presenting with leg pain. The history is provided by the patient. No  was used. Leg Pain    This is a chronic problem. The current episode started more than 1 week ago. The problem has been gradually worsening. The pain is present in the left lower leg and right lower leg. The quality of the pain is described as aching and constant. The pain is at a severity of 10/10. The pain is severe. Pertinent negatives include no numbness, no stiffness, no tingling, no back pain and no neck pain. The symptoms are aggravated by movement and palpation. He has tried OTC pain medications for the symptoms. There has been no history of extremity trauma.         Past Medical History:   Diagnosis Date    Ill-defined condition     venous stasis ulcers       Past Surgical History:   Procedure Laterality Date    HX CORONARY STENT PLACEMENT           Family History:   Problem Relation Age of Onset    Family history unknown: Yes       Social History     Social History    Marital status: SINGLE     Spouse name: N/A    Number of children: N/A    Years of education: N/A     Occupational History    Not on file. Social History Main Topics    Smoking status: Current Every Day Smoker     Packs/day: 1.00    Smokeless tobacco: Never Used    Alcohol use No    Drug use: No    Sexual activity: Not on file     Other Topics Concern    Not on file     Social History Narrative     ALLERGIES: Pcn [penicillins] and Tylenol [acetaminophen]    Review of Systems   Constitutional: Negative for appetite change, chills, fever and unexpected weight change. HENT: Negative for ear pain, hearing loss, rhinorrhea and trouble swallowing. Eyes: Negative for pain and visual disturbance. Respiratory: Negative for cough, chest tightness and shortness of breath. Cardiovascular: Positive for leg swelling. Negative for chest pain and palpitations. Gastrointestinal: Negative for abdominal distention, abdominal pain, blood in stool and vomiting. Genitourinary: Negative for dysuria, hematuria and urgency. Musculoskeletal: Negative for back pain, myalgias, neck pain and stiffness. Skin: Positive for wound. Negative for rash. Neurological: Negative for dizziness, tingling, syncope, weakness and numbness. Psychiatric/Behavioral: Negative for confusion and suicidal ideas. All other systems reviewed and are negative. Vitals:    01/23/18 0620   BP: 126/88   Pulse: (!) 101   Resp: 18   SpO2: 98%   Weight: 102.1 kg (225 lb)   Height: 5' 8\" (1.727 m)            Physical Exam   Constitutional: He is oriented to person, place, and time. He appears well-developed and well-nourished. No distress. HENT:   Head: Normocephalic and atraumatic. Right Ear: External ear normal.   Left Ear: External ear normal.   Nose: Nose normal.   Mouth/Throat: Oropharynx is clear and moist. No oropharyngeal exudate. Eyes: Conjunctivae and EOM are normal. Pupils are equal, round, and reactive to light. Right eye exhibits no discharge.  Left eye exhibits no discharge. No scleral icterus. Neck: Normal range of motion. Neck supple. No JVD present. No tracheal deviation present. Cardiovascular: Normal rate, regular rhythm, normal heart sounds and intact distal pulses. Exam reveals no gallop and no friction rub. No murmur heard. Pulmonary/Chest: Effort normal and breath sounds normal. No stridor. No respiratory distress. He has no decreased breath sounds. He has no wheezes. He has no rhonchi. He has no rales. He exhibits no tenderness. Abdominal: Soft. Bowel sounds are normal. He exhibits no distension. There is no tenderness. There is no rebound and no guarding. Musculoskeletal: Normal range of motion. He exhibits edema. He exhibits no tenderness. Right lower leg: He exhibits swelling and edema. Left lower leg: He exhibits swelling and edema. 1-2+ bilateral lower extremity edema. Legs are wrapped from feet to proximal tib/fib area. Mild drainage noted on dressing. Neurological: He is alert and oriented to person, place, and time. He has normal strength and normal reflexes. No cranial nerve deficit or sensory deficit. He exhibits normal muscle tone. Coordination normal. GCS eye subscore is 4. GCS verbal subscore is 5. GCS motor subscore is 6. Skin: Skin is warm and dry. No rash noted. He is not diaphoretic. No erythema. No pallor. Psychiatric: He has a normal mood and affect. His behavior is normal. Judgment and thought content normal.   Nursing note and vitals reviewed.        MDM  Number of Diagnoses or Management Options  Bilateral leg pain:   Leg swelling:   Other chronic pain:      Amount and/or Complexity of Data Reviewed  Review and summarize past medical records: yes (Recent hospital admission.)    Risk of Complications, Morbidity, and/or Mortality  Presenting problems: low  Diagnostic procedures: low  Management options: low    Patient Progress  Patient progress: stable    ED Course       Procedures    Chief Complaint   Patient presents with    Leg Pain       The patient's presenting problems have been discussed, and they are in agreement with the care plan formulated and outlined with them. I have encouraged them to ask questions as they arise throughout their visit. MEDICATIONS GIVEN:  Medications   ketorolac tromethamine (TORADOL) 60 mg/2 mL injection 60 mg (not administered)       LABS REVIEWED:  No results found for this or any previous visit (from the past 24 hour(s)). VITAL SIGNS:  Patient Vitals for the past 12 hrs:   Pulse Resp BP SpO2   01/23/18 0620 (!) 101 18 126/88 98 %       RADIOLOGY RESULTS:  The following have been ordered and reviewed:  No results found. PROGRESS NOTES:  Discussed results and plan with patient. Pt instructed to follow up with the wound clinic. Pt offered toradol in the ED, he refused. Instructed that I can not give him narcotics due to him driving. Pt encouraged to go to the methadone clinic to receive his meds. Patient will be discharged home with PCP followup. Patient instructed to return to the emergency room for any worsening symptoms or any other concerns. DIAGNOSIS:    1. Leg swelling    2. Bilateral leg pain    3. Other chronic pain        PLAN:  Follow-up Information     Follow up With Details Comments Contact Info    [facility] Call today  [facility] [address] [facility]  [phone]    your doctor Call today      [facility] EMERGENCY DEPT  [facility] wor[facility]  [phone]        Current Discharge Medication List      CONTINUE these medications which have NOT CHANGED    Details   naproxen sodium (ALEVE) 220 mg cap Take 8 Caps by mouth as needed. ibuprofen (MOTRIN) 600 mg tablet Take 1 Tab by mouth every six (6) hours as needed for Pain.   Qty: 20 Tab, Refills: 0      HYDROcodone-acetaminophen (NORCO) 5-325 mg per tablet Take 1 Tab by mouth every four (4) hours as needed for Pain.  Qty: 15 Tab, Refills: 0             ED COURSE: The patient's hospital course has been uncomplicated.

## 2018-01-23 NOTE — ED NOTES
Pt asking for Methadone 130mg. Pt \"I'm out and need it\"  Dr Flo Schwarz made aware,  Pt refused Toradol. Dr Flo Schwarz instructed pt to go to Methadone clinic.

## 2018-01-23 NOTE — DISCHARGE INSTRUCTIONS
We hope that we have addressed all of your medical concerns. The examination and treatment you received in the Emergency Department were for an emergent problem and were not intended as complete care. It is important that you follow up with your healthcare provider(s) for ongoing care. If your symptoms worsen or do not improve as expected, and you are unable to reach your usual health care provider(s), you should return to the Emergency Department. Today's healthcare is undergoing tremendous change, and patient satisfaction surveys are one of the many tools to assess the quality of medical care. You may receive a survey from the [employer] organization regarding your experience in the Emergency Department. I hope that your experience has been completely positive, particularly the medical care that I provided. As such, please participate in the survey; anything less than excellent does not meet my expectations or intentions. [facility] and [facility] participate in nationally recognized quality of care measures. If your blood pressure is greater than 120/80, as reported below, we urge that you seek medical care to address the potential of high blood pressure, commonly known as hypertension. Hypertension can be hereditary or can be caused by certain medical conditions, pain, stress, or \"white coat syndrome. \"       Please make an appointment with your health care provider(s) for follow up of your Emergency Department visit. VITALS:   Patient Vitals for the past 8 hrs:   Pulse Resp BP SpO2   01/23/18 0620 (!) 101 18 126/88 98 %          Thank you for allowing us to provide you with medical care today. We realize that you have many choices for your emergency care needs. Please choose us in the future for any continued health care needs. Connor Arguello, [address] [place] [address] [address]   Office: [phone]            No results found for this or any previous visit (from the past 24 hour(s)). No results found. Chronic Pain: Care Instructions  Your Care Instructions    Chronic pain is pain that lasts a long time (months or even years) and may or may not have a clear cause. It is different from acute pain, which usually does have a clear cause-like an injury or illness-and gets better over time. Chronic pain:  · Lasts over time but may vary from day to day. · Does not go away despite efforts to end it. · May disrupt your sleep and lead to fatigue. · May cause depression or anxiety. · May make your muscles tense, causing more pain. · Can disrupt your work, hobbies, home life, and relationships with friends and family. Chronic pain is a very real condition. It is not just in your head. Treatment can help and usually includes several methods used together, such as medicines, physical therapy, exercise, and other treatments. Learning how to relax and changing negative thought patterns can also help you cope. Chronic pain is complex. Taking an active role in your treatment will help you better manage your pain. Tell your doctor if you have trouble dealing with your pain. You may have to try several things before you find what works best for you. Follow-up care is a key part of your treatment and safety. Be sure to make and go to all appointments, and call your doctor if you are having problems. It's also a good idea to know your test results and keep a list of the medicines you take. How can you care for yourself at home? · Pace yourself. Break up large jobs into smaller tasks. Save harder tasks for days when you have less pain, or go back and forth between hard tasks and easier ones. Take rest breaks. · Relax, and reduce stress. Relaxation techniques such as deep breathing or meditation can help. · Keep moving. Gentle, daily exercise can help reduce pain over the long run.  Try low- or no-impact exercises such as walking, swimming, and stationary biking. Do stretches to stay flexible. · Try heat, cold packs, and massage. · Get enough sleep. Chronic pain can make you tired and drain your energy. Talk with your doctor if you have trouble sleeping because of pain. · Think positive. Your thoughts can affect your pain level. Do things that you enjoy to distract yourself when you have pain instead of focusing on the pain. See a movie, read a book, listen to music, or spend time with a friend. · If you think you are depressed, talk to your doctor about treatment. · Keep a daily pain diary. Record how your moods, thoughts, sleep patterns, activities, and medicine affect your pain. You may find that your pain is worse during or after certain activities or when you are feeling a certain emotion. Having a record of your pain can help you and your doctor find the best ways to treat your pain. · Take pain medicines exactly as directed. ¨ If the doctor gave you a prescription medicine for pain, take it as prescribed. ¨ If you are not taking a prescription pain medicine, ask your doctor if you can take an over-the-counter medicine. Reducing constipation caused by pain medicine  · Include fruits, vegetables, beans, and whole grains in your diet each day. These foods are high in fiber. · Drink plenty of fluids, enough so that your urine is light yellow or clear like water. If you have kidney, heart, or liver disease and have to limit fluids, talk with your doctor before you increase the amount of fluids you drink. · If your doctor recommends it, get more exercise. Walking is a good choice. Bit by bit, increase the amount you walk every day. Try for at least 30 minutes on most days of the week. · Schedule time each day for a bowel movement. A daily routine may help. Take your time and do not strain when having a bowel movement. When should you call for help?   Call your doctor now or seek immediate medical care if:  ? · Your pain gets worse or is out of control. ? · You feel down or blue, or you do not enjoy things like you once did. You may be depressed, which is common in people with chronic pain. Depression can be treated. ? · You have vomiting or cramps for more than 2 hours. ? Watch closely for changes in your health, and be sure to contact your doctor if:  ? · You cannot sleep because of pain. ? · You are very worried or anxious about your pain. ? · You have trouble taking your pain medicine. ? · You have any concerns about your pain medicine. ? · You have trouble with bowel movements, such as:  ¨ No bowel movement in 3 days. ¨ Blood in the anal area, in your stool, or on the toilet paper. ¨ Diarrhea for more than 24 hours. Where can you learn more? Go to http://gabriela-chace.info/. Enter N004 in the search box to learn more about \"Chronic Pain: Care Instructions. \"  Current as of: October 14, 2016  Content Version: 11.4  © 8514-2763 [employer]. Care instructions adapted under license by [employer] (which disclaims liability or warranty for this information). If you have questions about a medical condition or this instruction, always ask your healthcare professional. [address] any warranty or liability for your use of this information.

## 2018-01-23 NOTE — ED NOTES
Discharge note: The patient was discharged home in stable condition. The patient is alert and oriented, is in no respiratory distress and has vital signs noted. The patient's diagnosis, condition and treatment were explained to patient by Dr Rui Sotelo and reinforced by nurse. The patient and expressed understanding of discharge instructions, prescriptions, and plan of care. A discharge plan has been developed. A  was not involved in the process. Patient was assisted out to lobby via wheelchair to ED lobby for discharge.

## 2018-01-25 ENCOUNTER — HOSPITAL ENCOUNTER (OUTPATIENT)
Dept: WOUND CARE | Age: 43
Discharge: HOME | End: 2018-01-25
Payer: MEDICAID

## 2018-01-25 PROCEDURE — 99215 OFFICE O/P EST HI 40 MIN: CPT

## 2018-01-25 NOTE — WOUND CARE
[facility] H&P    Assessment/Plan:    Bilateral venous stasis ulcerations and inflammation ( I87.333), left leg ulcer to the level of fat ( N78.664), Right leg ulcer to the level of fat (L97.912)    - Pt evaluated and treated. - Patient is currently in acute pain from the ulcerations   - Spoke to patient family member in detail. Discussed with patient and family member that in the past patient has masked the acute pain from the venous ulcerations with narcotics and drugs abuse. - He currently is undergoing withdrawal from methadone   - To continue with pain management patient will need to follow up with the pain clinic that was dispensing him methadone. Attempted to contact a secondary pain management clinic, who recommended patient would get help in the most timely fashion from the clinic he already has an established relationship with. - Legs dressed with non adherent and DSD.  - Once pain management is on board, patient to return to wound care center for appropriate venous stasis ulceration management. - F/U as needed. Subjective:    36year old male with PMH significant for CAD s/p stent placement and hx of smoking has B/L venous stasis ulcerations. Patient has not got any care for theses ulcerations prior to comes to the wound care center. Patient states he was on methadone for heroin addiction however has not been about to get to the clinic for the past 3 days because his truck broke down. Patient comes in today complaining of pain out of proportion 2/2 bilateral lower extremity ulcerations. States he went to the ED who did not admit the patient. States he did not have supplied and wrapped the legs with gauze that stuck to her legs which lead to more pain. Negative for fever, chills, nausea, vomiting, chest pain, shortness of breath.         History:  Both leg wounds and ankles  Allergies   Allergen Reactions    Pcn [Penicillins] Swelling    Tylenol [Acetaminophen] Other (comments)     abd pain        Family History   Problem Relation Age of Onset    Family history unknown: Yes      Past Medical History:   Diagnosis Date    Ill-defined condition     venous stasis ulcers     Past Surgical History:   Procedure Laterality Date    HX CORONARY STENT PLACEMENT       Social History   Substance Use Topics    Smoking status: Current Every Day Smoker     Packs/day: 1.00    Smokeless tobacco: Never Used    Alcohol use No       History   Alcohol Use No     History   Drug Use No      History   Smoking Status    Current Every Day Smoker    Packs/day: 1.00   Smokeless Tobacco    Never Used     Current Outpatient Prescriptions   Medication Sig    methadone (DOLOPHINE) 10 mg tablet Take 130 mg by mouth every four (4) hours as needed for Pain.  naproxen sodium (ALEVE) 220 mg cap Take 8 Caps by mouth as needed.  ibuprofen (MOTRIN) 600 mg tablet Take 1 Tab by mouth every six (6) hours as needed for Pain.  HYDROcodone-acetaminophen (NORCO) 5-325 mg per tablet Take 1 Tab by mouth every four (4) hours as needed for Pain. No current facility-administered medications for this encounter. Objective:  Vitals: VSS, afebrile. Vascular:  B/L LE  DP 2/4; PT 2/4  capillary fill time brisk, pitting edema is present, skin temperature is cool, varicosities are about. Dermatological:  Nails are thickened, elongated, discolored, painful to palpation, 2mm thick, with subungual debris. There are extensive skin cracks at both heels. Skin is dry and scaly, exhibits hemosiderin deposition. There is no maceration of the interspaces of the feet b/l. Wound: 1  Location: Left leg circumferencial  Measurements: see note in [facility]  Margins: geographic  Drainage: serous  Odor: none  Wound base: fibrogranular  Lymphangitic streaking? No.  Undermining? No.  Sinus tracts? No.  Exposed bone? No.  Subcutaneous crepitation on palpation?  No.    Wound: Location: Right leg circumerencial  Measurements: see note in [facility]  Margins: geographic  Drainage: serous  Odor: none  Wound base: fibrogranular  Lymphangitic streaking? No.  Undermining? No.  Sinus tracts? No.  Exposed bone? No.  Subcutaneous crepitation on palpation? No.      Neurological:  DTR are present, protective sensation per 5.07 [place] Raheem monofilament is intact, patient is AAOx3, mood is aggitated. Epicritic sensation is intact. Orthopedic:  B/L LE are symmetric, ROM of ankle, STJ, 1st MTPJ is limited, MMT 5 out of 5 for B/L LE. There has been no amputations    Constitutional: Pt is a well developed, middle aged average male. Lymphatics: negative tenderness to palpation of neck/axillary/inguinal nodes. [facility]  Bolivar Lynch DPM - Mehdi Lacy, [address] [place] [address], [address] [place] [address]. [place] [address], [place], [address] [facility]  P: [phone]  F: [phone]

## 2018-01-29 ENCOUNTER — HOSPITAL ENCOUNTER (OUTPATIENT)
Dept: WOUND CARE | Age: 43
Discharge: HOME | End: 2018-01-29
Payer: MEDICAID

## 2018-01-29 PROCEDURE — 99215 OFFICE O/P EST HI 40 MIN: CPT

## 2018-04-15 ENCOUNTER — APPOINTMENT (OUTPATIENT)
Dept: GENERAL RADIOLOGY | Age: 43
DRG: 853 | End: 2018-04-15
Attending: EMERGENCY MEDICINE
Payer: SELF-PAY

## 2018-04-15 ENCOUNTER — HOSPITAL ENCOUNTER (INPATIENT)
Age: 43
LOS: 15 days | Discharge: HOME | DRG: 853 | End: 2018-04-30
Attending: EMERGENCY MEDICINE | Admitting: INTERNAL MEDICINE
Payer: SELF-PAY

## 2018-04-15 DIAGNOSIS — L03.115 CELLULITIS OF RIGHT LOWER EXTREMITY: Primary | ICD-10-CM

## 2018-04-15 DIAGNOSIS — G89.29 OTHER CHRONIC PAIN: ICD-10-CM

## 2018-04-15 DIAGNOSIS — R53.81 PHYSICAL DEBILITY: ICD-10-CM

## 2018-04-15 DIAGNOSIS — F11.90 METHADONE USE: ICD-10-CM

## 2018-04-15 DIAGNOSIS — I87.2 CHRONIC VENOUS STASIS DERMATITIS: ICD-10-CM

## 2018-04-15 DIAGNOSIS — I87.2 CHRONIC VENOUS STASIS DERMATITIS OF BOTH LOWER EXTREMITIES: ICD-10-CM

## 2018-04-15 DIAGNOSIS — M79.604 ACUTE LEG PAIN, RIGHT: ICD-10-CM

## 2018-04-15 PROBLEM — I83.009 CHRONIC CUTANEOUS VENOUS STASIS ULCER (HCC): Chronic | Status: ACTIVE | Noted: 2018-01-14

## 2018-04-15 PROBLEM — I10 HTN (HYPERTENSION): Chronic | Status: ACTIVE | Noted: 2018-01-14

## 2018-04-15 PROBLEM — L97.909 CHRONIC CUTANEOUS VENOUS STASIS ULCER (HCC): Chronic | Status: ACTIVE | Noted: 2018-01-14

## 2018-04-15 PROBLEM — L03.90 CELLULITIS: Status: ACTIVE | Noted: 2018-04-15

## 2018-04-15 PROBLEM — E66.9 OBESITY (BMI 30-39.9): Chronic | Status: ACTIVE | Noted: 2018-04-15

## 2018-04-15 PROBLEM — R73.9 HYPERGLYCEMIA: Status: ACTIVE | Noted: 2018-04-15

## 2018-04-15 PROBLEM — Z72.0 TOBACCO ABUSE: Chronic | Status: ACTIVE | Noted: 2018-04-15

## 2018-04-15 PROBLEM — A41.9 SEPSIS (HCC): Status: ACTIVE | Noted: 2018-04-15

## 2018-04-15 LAB
ALBUMIN SERPL-MCNC: 3.3 G/DL (ref 3.5–5)
ALBUMIN/GLOB SERPL: 0.9 {RATIO} (ref 1.1–2.2)
ALP SERPL-CCNC: 80 U/L (ref 45–117)
ALT SERPL-CCNC: 21 U/L (ref 12–78)
ANION GAP SERPL CALC-SCNC: 9 MMOL/L (ref 5–15)
AST SERPL-CCNC: 21 U/L (ref 15–37)
BASOPHILS # BLD: 0 K/UL (ref 0–0.1)
BASOPHILS NFR BLD: 0 % (ref 0–1)
BILIRUB SERPL-MCNC: 0.6 MG/DL (ref 0.2–1)
BUN SERPL-MCNC: 15 MG/DL (ref 6–20)
BUN/CREAT SERPL: 13 (ref 12–20)
CALCIUM SERPL-MCNC: 8.5 MG/DL (ref 8.5–10.1)
CHLORIDE SERPL-SCNC: 100 MMOL/L (ref 97–108)
CO2 SERPL-SCNC: 24 MMOL/L (ref 21–32)
CREAT SERPL-MCNC: 1.19 MG/DL (ref 0.7–1.3)
DIFFERENTIAL METHOD BLD: ABNORMAL
EOSINOPHIL # BLD: 0 K/UL (ref 0–0.4)
EOSINOPHIL NFR BLD: 0 % (ref 0–7)
ERYTHROCYTE [DISTWIDTH] IN BLOOD BY AUTOMATED COUNT: 14.8 % (ref 11.5–14.5)
GLOBULIN SER CALC-MCNC: 3.7 G/DL (ref 2–4)
GLUCOSE SERPL-MCNC: 166 MG/DL (ref 65–100)
HCT VFR BLD AUTO: 37.6 % (ref 36.6–50.3)
HGB BLD-MCNC: 12.4 G/DL (ref 12.1–17)
LACTATE SERPL-SCNC: 1.3 MMOL/L (ref 0.4–2)
LYMPHOCYTES # BLD: 0.6 K/UL
LYMPHOCYTES NFR BLD: 4 % (ref 12–49)
MCH RBC QN AUTO: 27.3 PG (ref 26–34)
MCHC RBC AUTO-ENTMCNC: 33 G/DL (ref 30–36.5)
MCV RBC AUTO: 82.6 FL (ref 80–99)
MONOCYTES # BLD: 0.4 K/UL (ref 0–1)
MONOCYTES NFR BLD: 3 % (ref 5–13)
NEUTS SEG # BLD: 13.3 K/UL (ref 1.8–8)
NEUTS SEG NFR BLD: 93 % (ref 32–75)
PLATELET # BLD AUTO: 274 K/UL (ref 150–400)
PMV BLD AUTO: 8.1 FL (ref 8.9–12.9)
POTASSIUM SERPL-SCNC: 3.8 MMOL/L (ref 3.5–5.1)
PROT SERPL-MCNC: 7 G/DL (ref 6.4–8.2)
RBC # BLD AUTO: 4.55 M/UL (ref 4.1–5.7)
RBC MORPH BLD: ABNORMAL
SODIUM SERPL-SCNC: 133 MMOL/L (ref 136–145)
WBC # BLD AUTO: 14.3 K/UL (ref 4.1–11.1)
XXWBCSUS: 0

## 2018-04-15 PROCEDURE — 96372 THER/PROPH/DIAG INJ SC/IM: CPT

## 2018-04-15 PROCEDURE — 83605 ASSAY OF LACTIC ACID: CPT | Performed by: EMERGENCY MEDICINE

## 2018-04-15 PROCEDURE — 99284 EMERGENCY DEPT VISIT MOD MDM: CPT

## 2018-04-15 PROCEDURE — 36415 COLL VENOUS BLD VENIPUNCTURE: CPT | Performed by: EMERGENCY MEDICINE

## 2018-04-15 PROCEDURE — 80053 COMPREHEN METABOLIC PANEL: CPT | Performed by: EMERGENCY MEDICINE

## 2018-04-15 PROCEDURE — 74011250636 HC RX REV CODE- 250/636: Performed by: INTERNAL MEDICINE

## 2018-04-15 PROCEDURE — 71045 X-RAY EXAM CHEST 1 VIEW: CPT

## 2018-04-15 PROCEDURE — 96365 THER/PROPH/DIAG IV INF INIT: CPT

## 2018-04-15 PROCEDURE — 96375 TX/PRO/DX INJ NEW DRUG ADDON: CPT

## 2018-04-15 PROCEDURE — 74011000258 HC RX REV CODE- 258: Performed by: INTERNAL MEDICINE

## 2018-04-15 PROCEDURE — 93005 ELECTROCARDIOGRAM TRACING: CPT

## 2018-04-15 PROCEDURE — 87040 BLOOD CULTURE FOR BACTERIA: CPT | Performed by: EMERGENCY MEDICINE

## 2018-04-15 PROCEDURE — 65270000029 HC RM PRIVATE

## 2018-04-15 PROCEDURE — 74011250636 HC RX REV CODE- 250/636: Performed by: EMERGENCY MEDICINE

## 2018-04-15 PROCEDURE — 74011250637 HC RX REV CODE- 250/637: Performed by: EMERGENCY MEDICINE

## 2018-04-15 PROCEDURE — 85025 COMPLETE CBC W/AUTO DIFF WBC: CPT | Performed by: EMERGENCY MEDICINE

## 2018-04-15 RX ORDER — SODIUM CHLORIDE 0.9 % (FLUSH) 0.9 %
5-10 SYRINGE (ML) INJECTION AS NEEDED
Status: DISCONTINUED | OUTPATIENT
Start: 2018-04-15 | End: 2018-04-27 | Stop reason: SDUPTHER

## 2018-04-15 RX ORDER — ENOXAPARIN SODIUM 100 MG/ML
1 INJECTION SUBCUTANEOUS
Status: COMPLETED | OUTPATIENT
Start: 2018-04-15 | End: 2018-04-15

## 2018-04-15 RX ORDER — IBUPROFEN 600 MG/1
600 TABLET ORAL
Status: COMPLETED | OUTPATIENT
Start: 2018-04-15 | End: 2018-04-15

## 2018-04-15 RX ORDER — VANCOMYCIN/0.9 % SOD CHLORIDE 1.5G/250ML
1500 PLASTIC BAG, INJECTION (ML) INTRAVENOUS ONCE
Status: COMPLETED | OUTPATIENT
Start: 2018-04-15 | End: 2018-04-15

## 2018-04-15 RX ORDER — MORPHINE SULFATE 4 MG/ML
2 INJECTION INTRAVENOUS
Status: COMPLETED | OUTPATIENT
Start: 2018-04-15 | End: 2018-04-15

## 2018-04-15 RX ADMIN — SODIUM CHLORIDE 3063 ML: 900 INJECTION, SOLUTION INTRAVENOUS at 21:33

## 2018-04-15 RX ADMIN — VANCOMYCIN HYDROCHLORIDE 500 MG: 500 INJECTION, POWDER, LYOPHILIZED, FOR SOLUTION INTRAVENOUS at 23:38

## 2018-04-15 RX ADMIN — MORPHINE SULFATE 2 MG: 4 INJECTION INTRAVENOUS at 21:44

## 2018-04-15 RX ADMIN — ENOXAPARIN SODIUM 100 MG: 100 INJECTION SUBCUTANEOUS at 21:45

## 2018-04-15 RX ADMIN — IBUPROFEN 600 MG: 600 TABLET ORAL at 21:47

## 2018-04-15 RX ADMIN — VANCOMYCIN HYDROCHLORIDE 1500 MG: 10 INJECTION, POWDER, LYOPHILIZED, FOR SOLUTION INTRAVENOUS at 21:40

## 2018-04-15 NOTE — IP AVS SNAPSHOT
[address] 
[phone] Patient: Dina Langston MRN: [id number] :1975 A check nadine indicates which time of day the medication should be taken. My Medications START taking these medications Instructions Each Dose to Equal  
 Morning Noon Evening Bedtime  
 gabapentin 300 mg capsule Commonly known as:  NEURONTIN Your last dose was:  18 at 9:40 am  
   
 Take 1 Cap by mouth three (3) times daily for 30 days. Indications: NEUROPATHIC PAIN  
 300 mg  
    
   
   
   
  
 oxyCODONE IR 30 mg immediate release tablet Commonly known as:  Onita Estimable Your last dose was:  18 at 9:40am  
   
 Take 1 Tab by mouth every four (4) hours as needed for up to 20 doses. Max Daily Amount: 180 mg.  
 30 mg  
    
   
   
   
  
 polyethylene glycol 17 gram packet Commonly known as:  Kayla Plane Start taking on:  2018 Your last dose was:  18 at 9:40 am  
   
 Take 1 Packet by mouth daily for 30 days. 17 g  
    
   
   
   
  
 senna-docusate 8.6-50 mg per tablet Commonly known as:  Blanquita Bagwell Your last dose was:  18 at 9am  
   
 Take 1 Tab by mouth nightly for 30 days. 1 Tab CONTINUE taking these medications Instructions Each Dose to Equal  
 Morning Noon Evening Bedtime  
 aspirin delayed-release 81 mg tablet Take 81 mg by mouth daily. 81 mg  
    
   
   
   
  
 methadone 10 mg/mL solution Commonly known as:  DOLOPHINE Take 110 mg by mouth daily. 110 mg  
    
   
   
   
  
  
STOP taking these medications ALEVE 220 mg tablet Generic drug:  naproxen sodium Where to Get Your Medications Information on where to get these meds will be given to you by the nurse or doctor. ! Ask your nurse or doctor about these medications  
  gabapentin 300 mg capsule oxyCODONE IR 30 mg immediate release tablet  
 polyethylene glycol 17 gram packet  
 senna-docusate 8.6-50 mg per tablet

## 2018-04-15 NOTE — IP AVS SNAPSHOT
[address] 
[phone] Patient: Calos Arce MRN: [id number] :1975 About your hospitalization You were admitted on:  April 15, 2018 You last received care in the:  [facility]  MED SURG 2 You were discharged on:  2018 Why you were hospitalized Your primary diagnosis was:  Cellulitis Your diagnoses also included:  Htn (Hypertension), Chronic Cutaneous Venous Stasis Ulcer (Hcc), Chronic Pain, Obesity (Bmi 30-39.9), Hyperglycemia, Sepsis (Hcc), Tobacco Abuse, Leukocytosis, Anemia, Hypomagnesemia, Hypophosphatemia, Open Leg Wound Follow-up Information Follow up With Details Comments Contact Info [address]y [address] On 2018 [address] [place] [address] 50 [facility] 
[phone] None   None (395) Patient stated that they have no PCP Your Scheduled Appointments Friday May 04, 2018  8:00 AM EDT  
WOUND CARE NEW PATIENT with Manasa Tilley., MD, [address] [facility] [address] [facility] [phone] Discharge Orders None A check nadine indicates which time of day the medication should be taken. My Medications START taking these medications Instructions Each Dose to Equal  
 Morning Noon Evening Bedtime  
 gabapentin 300 mg capsule Commonly known as:  NEURONTIN Your last dose was:  18 at 9:40 am  
   
 Take 1 Cap by mouth three (3) times daily for 30 days. Indications: NEUROPATHIC PAIN  
 300 mg  
    
   
   
   
  
 oxyCODONE IR 30 mg immediate release tablet Commonly known as:  Jenita Mars Your last dose was:  18 at 9:40am  
   
 Take 1 Tab by mouth every four (4) hours as needed for up to 20 doses.  Max Daily Amount: 180 mg.  
 30 mg  
    
   
   
   
  
 polyethylene glycol 17 gram packet Commonly known as:  Rosina Ortiz Start taking on:  5/1/2018 Your last dose was:  4/30/18 at 9:40 am  
   
 Take 1 Packet by mouth daily for 30 days. 17 g  
    
   
   
   
  
 senna-docusate 8.6-50 mg per tablet Commonly known as:  Allan Morrison Your last dose was:  4/22/18 at 9am  
   
 Take 1 Tab by mouth nightly for 30 days. 1 Tab CONTINUE taking these medications Instructions Each Dose to Equal  
 Morning Noon Evening Bedtime  
 aspirin delayed-release 81 mg tablet Take 81 mg by mouth daily. 81 mg  
    
   
   
   
  
 methadone 10 mg/mL solution Commonly known as:  DOLOPHINE Take 110 mg by mouth daily. 110 mg  
    
   
   
   
  
  
STOP taking these medications ALEVE 220 mg tablet Generic drug:  naproxen sodium Where to Get Your Medications Information on where to get these meds will be given to you by the nurse or doctor. ! Ask your nurse or doctor about these medications  
  gabapentin 300 mg capsule  
 oxyCODONE IR 30 mg immediate release tablet  
 polyethylene glycol 17 gram packet  
 senna-docusate 8.6-50 mg per tablet Opioid Education Prescription Opioids: What You Need to Know: 
 
 
NAME:            Diana Gusman :  1975 MRN:  [id number] Date:     2018 ADMIT DATE: 4/15/2018 DISCHARGE DATE: 2018 PRINCIPAL ADMITTING DIAGNOSIS: 
Cellulitis, Right Lower extremity veinous ulcers DISCHARGE DIAGNOSES: 
Principal Problem: 
  Cellulitis (4/15/2018) Open leg wound (1/14/2018) Chronic cutaneous venous stasis ulcer ([address] (1/14/2018) HTN (hypertension) (1/14/2018) Chronic pain (1/14/2018) Obesity (BMI 30-39.9) (4/15/2018) Tobacco abuse (4/15/2018) Anemia (4/16/2018) MEDICATIONS: 
 
· It is important that medications are taken exactly as they are prescribed on the discharge medication instructions and keep them your  in the bottles provided by the pharmacist.  
· Keep a list of the medication names, dosages, and times to be taken at all times. · Do not take other medications without consulting your doctor. Recommended diet:  Regular Diet Recommended activity: Activity as tolerated Post discharge care: 
 
Notify follow up health care provider or return to the emergency department if you cannot get hold of your doctor if you feel worse or experience symptoms similar to those that brought you to hospital 
 
Follow-up Information Follow up With Details Comments Contact Info Jessy Polk Dr On 4/27/2018 [address] [place] [address] [facility] 
[phone] None   None (395) Patient stated that they have no PCP Information obtained by : 
I understand that if any problems occur once I am at home I am to contact my physician and I understand and acknowledge receipt of the instructions indicated above. Physician's or R.N.'s Signature                                                                  Date/Time Patient or Representative Signature                                                          Date/Time MyChart Announcement We are excited to announce that we are making your provider's discharge notes available to you in Sun Number. You will see these notes when they are completed and signed by the physician that discharged you from your recent hospital stay. If you have any questions or concerns about any information you see in Sun Number, please call the Health Information Department where you were seen or reach out to your Primary Care Provider for more information about your plan of care. Introducing hospitals & HEALTH SERVICES! Mohan Hernandez introduces Sun Number patient portal. Now you can access parts of your medical record, email your doctor's office, and request medication refills online. 1. In your internet browser, go to [email or web address] 2. Click on the First Time User? Click Here link in the Sign In box. You will see the New Member Sign Up page. 3. Enter your Sun Number Access Code exactly as it appears below. You will not need to use this code after youve completed the sign-up process. If you do not sign up before the expiration date, you must request a new code. · Sun Number Access Code: [email or web address] Expires: 7/14/2018  9:02 PM 
 
4. Enter the last four digits of your Social Security Number (xxxx) and Date of Birth (mm/dd/yyyy) as indicated and click Submit. You will be taken to the next sign-up page. 5. Create a Sun Number ID. This will be your Sun Number login ID and cannot be changed, so think of one that is secure and easy to remember. 6. Create a Sun Number password. You can change your password at any time. 7. Enter your Password Reset Question and Answer. This can be used at a later time if you forget your password. 8. Enter your e-mail address. You will receive e-mail notification when new information is available in [address] 9. Click Sign Up. You can now view and download portions of your medical record.  
10. Click the Download Summary menu link to download a portable copy of your medical information. If you have questions, please visit the Frequently Asked Questions section of the FoodEssentialshart website. Remember, [employer] is NOT to be used for urgent needs. For medical emergencies, dial 911. Now available from your iPhone and Android! Introducing Vikas Hawk As a [facility] patient, I wanted to make you aware of our electronic visit tool called Vikas Hawk. [facility] 24/7 allows you to connect within minutes with a medical provider 24 hours a day, seven days a week via a mobile device or tablet or logging into a secure website from your computer. You can access Vikas Hawk from anywhere in the [place]. A virtual visit might be right for you when you have a simple condition and feel like you just dont want to get out of bed, or cant get away from work for an appointment, when your regular [facility] provider is not available (evenings, weekends or holidays), or when youre out of town and need minor care. Electronic visits cost only $49 and if the [facility] 24/7 provider determines a prescription is needed to treat your condition, one can be electronically transmitted to a nearby pharmacy*. Please take a moment to enroll today if you have not already done so. The enrollment process is free and takes just a few minutes. To enroll, please download the [facility] 24/7 jose to your tablet or phone, or visit [email or web address] to enroll on your computer. And, as an [address] [place] [address] patient with a [employer] account, the results of your visits will be scanned into your electronic medical record and your primary care provider will be able to view the scanned results. We urge you to continue to see your regular [facility] provider for your ongoing medical care.   And while your primary care provider may not be the one available when you seek a Vikas Hawk virtual visit, the peace of mind you get from getting a real diagnosis real time can be priceless. For more information on Vikas Álvarezpeter, view our Frequently Asked Questions (FAQs) at [email or web address]. Sincerely, 
 
Adilson Preciado MD 
Chief Medical Officer Randi Gaspar *:  certain medications cannot be prescribed via Vikas Hawk Providers Seen During Your Hospitalization Provider Specialty Primary office phone Crain Mtz MD Emergency Medicine [phone] [facility] Internal Medicine [phone] Juli Mccurdy MD Internal Medicine [phone] Savannah Go MD Hospitalist [phone] Delmar Shabazz MD Internal Medicine [phone] Rebeca Bauer MD Internal Medicine [phone] Your Primary Care Physician (PCP) Primary Care Physician Office Phone Office Fax NONE ** None ** ** None ** You are allergic to the following Allergen Reactions Tylenol (Acetaminophen) Other (comments)  
 abd pain Recent Documentation Height Weight BMI Smoking Status 1.702 m 98.2 kg 33.91 kg/m2 Current Every Day Smoker Emergency Contacts Name Discharge Info Relation Home Work Mobile Qi Álvarez DISCHARGE CAREGIVER [3] Other Relative [6] Patient Belongings The following personal items are in your possession at time of discharge: 
  Dental Appliances: None  Visual Aid: None      Home Medications: None   Jewelry: None  Clothing: None    Other Valuables: None Please provide this summary of care documentation to your next provider. Signatures-by signing, you are acknowledging that this After Visit Summary has been reviewed with you and you have received a copy. Patient Signature:  ____________________________________________________________ Date:  ____________________________________________________________  
  
Hipolito Parekh  Provider Signature: ____________________________________________________________ Date:  ____________________________________________________________

## 2018-04-15 NOTE — IP AVS SNAPSHOT
Summary of Care Report The Summary of Care report has been created to help improve care coordination. Users with access to Relaborate or [address] (Web-based application) may access additional patient information including the Discharge Summary. If you are not currently a [address] user and need more information, please call the number listed below in the [place] [address] section and ask to be connected with Medical Records. Facility Information Name Address Phone [address]inny [address] [place] [phone] Patient Information Patient Name Sex  Angela Eaton ([id number]) Male 1975 Discharge Information Admitting Provider Service Area Unit Cyndee RutledgeDO / [facility] 5m2 Med Surg  [phone] Discharge Provider Discharge Date/Time Discharge Disposition Destination (none) 2018 (Pending) AHR (none) Patient Language Language ENGLISH [13] Hospital Problems as of 2018  Reviewed: 4/15/2018 10:27 PM by Desiderio Spatz, MD  
  
  
  
 Class Noted - Resolved Last Modified POA Active Problems Open leg wound  2018 - Present 2018 by Marisela Montalvo MD Yes Entered by Deniz Lindsay MD  
  Chronic cutaneous venous stasis ulcer ([address] (Chronic)  2018 - Present 4/15/2018 by Desiderio Spatz, MD Yes Entered by Deniz Lindsay MD  
  HTN (hypertension) (Chronic)  2018 - Present 4/15/2018 by Desiderio Spatz, MD Yes Entered by Deniz Lindsay MD  
  Chronic pain (Chronic)  2018 - Present 4/15/2018 by Desiderio Spatz, MD Yes Entered by Eleanor Turpin MD  
  * (Principal)Cellulitis  4/15/2018 - Present 4/15/2018 by Desiderio Spatz, MD Yes   Entered by Desiderio Spatz, MD  
 Obesity (BMI 30-39.9) (Chronic)  4/15/2018 - Present 4/15/2018 by Lazaro Obrien MD Yes Entered by Lazaro Obrien MD  
  Tobacco abuse (Chronic)  4/15/2018 - Present 4/15/2018 by Lazaro Obrien MD Yes Entered by Lazaro Obrien MD  
  Anemia  4/16/2018 - Present 4/16/2018 by Nelia Renee MD Yes Entered by Nelia Renee MD  
  
Non-Hospital Problems as of 4/30/2018  Reviewed: 4/15/2018 10:27 PM by Lazaro Obrien MD  
 None You are allergic to the following Allergen Reactions Tylenol (Acetaminophen) Other (comments)  
 abd pain Current Discharge Medication List  
  
START taking these medications Dose & Instructions Dispensing Information Comments  
 gabapentin 300 mg capsule Commonly known as:  NEURONTIN Dose:  300 mg Take 1 Cap by mouth three (3) times daily for 30 days. Indications: NEUROPATHIC PAIN Quantity:  90 Cap Refills:  0  
   
 oxyCODONE IR 30 mg immediate release tablet Commonly known as:  Zara Nava Dose:  30 mg Take 1 Tab by mouth every four (4) hours as needed for up to 20 doses. Max Daily Amount: 180 mg.  
 Quantity:  20 Tab Refills:  0  
   
 polyethylene glycol 17 gram packet Commonly known as:  Orysia [place] Start taking on:  5/1/2018 Dose:  17 g Take 1 Packet by mouth daily for 30 days. Quantity:  30 Packet Refills:  0  
   
 senna-docusate 8.6-50 mg per tablet Commonly known as:  Jacquenette Devoid Dose:  1 Tab Take 1 Tab by mouth nightly for 30 days. Quantity:  30 Tab Refills:  0 CONTINUE these medications which have NOT CHANGED Dose & Instructions Dispensing Information Comments  
 aspirin delayed-release 81 mg tablet Dose:  81 mg Take 81 mg by mouth daily. Refills:  0  
   
 methadone 10 mg/mL solution Commonly known as:  DOLOPHINE Dose:  110 mg Take 110 mg by mouth daily. Refills:  0 STOP taking these medications Comments ALEVE 220 mg tablet Generic drug:  naproxen sodium Surgery Information ID Date/Time Status Primary Surgeon All Procedures Location [id number] 2018 [facility] Red Ya MD MECHANICAL DEBRIDEMENT BILATERAL LEG WOUNDS [facility] MAIN OR    
 [id number] 2018 Jordy Mendes MD MECHANICAL DEBRIDEMENT BILATERAL LOWER EXTERMITIES PLACEMENT OF BILATERAL [facility]NA [facility] MAIN OR Follow-up Information Follow up With Details Comments Contact Info [address] On 2018 [address] [place] [address] 50 [facility] 
[phone] None   None (395) Patient stated that they have no PCP Discharge Instructions Follow up at [facility] on  at 8am for recheck and dressing change. HOSPITALIST DISCHARGE INSTRUCTIONS 
 
NAME:            Tess Tee :  1975 MRN:  [id number] Date:     2018 ADMIT DATE: 4/15/2018 DISCHARGE DATE: 2018 PRINCIPAL ADMITTING DIAGNOSIS: 
Cellulitis, Right Lower extremity veinous ulcers DISCHARGE DIAGNOSES: 
Principal Problem: 
  Cellulitis (4/15/2018) Open leg wound (2018) Chronic cutaneous venous stasis ulcer ([address] (2018) HTN (hypertension) (2018) Chronic pain (2018) Obesity (BMI 30-39.9) (4/15/2018) Tobacco abuse (4/15/2018) Anemia (2018) MEDICATIONS: 
 
· It is important that medications are taken exactly as they are prescribed on the discharge medication instructions and keep them your  in the bottles provided by the pharmacist.  
· Keep a list of the medication names, dosages, and times to be taken at all times. · Do not take other medications without consulting your doctor. Recommended diet:  Regular Diet Recommended activity: Activity as tolerated Post discharge care: Notify follow up health care provider or return to the emergency department if you cannot get hold of your doctor if you feel worse or experience symptoms similar to those that brought you to hospital 
 
Follow-up Information Follow up With Details Comments Contact [address]iss [address] On 4/27/2018 [address] [facility] [address] [facility] 
[phone] None   None (395) Patient stated that they have no PCP Information obtained by : 
I understand that if any problems occur once I am at home I am to contact my physician and I understand and acknowledge receipt of the instructions indicated above. Physician's or R.N.'s Signature                                                                  Date/Time Patient or Representative Signature                                                          Date/Time Chart Review Routing History No Routing History on File

## 2018-04-16 PROBLEM — D72.829 LEUKOCYTOSIS: Status: ACTIVE | Noted: 2018-04-16

## 2018-04-16 PROBLEM — E83.42 HYPOMAGNESEMIA: Status: ACTIVE | Noted: 2018-04-16

## 2018-04-16 PROBLEM — E83.39 HYPOPHOSPHATEMIA: Status: ACTIVE | Noted: 2018-04-16

## 2018-04-16 PROBLEM — D64.9 ANEMIA: Status: ACTIVE | Noted: 2018-04-16

## 2018-04-16 LAB
APPEARANCE UR: CLEAR
ATRIAL RATE: 99 BPM
BACTERIA URNS QL MICRO: NEGATIVE /HPF
BILIRUB UR QL: NEGATIVE
CALCULATED P AXIS, ECG09: 58 DEGREES
CALCULATED R AXIS, ECG10: 79 DEGREES
CALCULATED T AXIS, ECG11: 15 DEGREES
COLOR UR: ABNORMAL
DIAGNOSIS, 93000: NORMAL
EPITH CASTS URNS QL MICRO: NORMAL /LPF
ERYTHROCYTE [DISTWIDTH] IN BLOOD BY AUTOMATED COUNT: 14.6 % (ref 11.5–14.5)
GLUCOSE UR STRIP.AUTO-MCNC: NEGATIVE MG/DL
HCT VFR BLD AUTO: 33.8 % (ref 36.6–50.3)
HGB BLD-MCNC: 10.8 G/DL (ref 12.1–17)
HGB UR QL STRIP: ABNORMAL
KETONES UR QL STRIP.AUTO: NEGATIVE MG/DL
LEUKOCYTE ESTERASE UR QL STRIP.AUTO: NEGATIVE
MAGNESIUM SERPL-MCNC: 1.5 MG/DL (ref 1.6–2.4)
MCH RBC QN AUTO: 26.6 PG (ref 26–34)
MCHC RBC AUTO-ENTMCNC: 32 G/DL (ref 30–36.5)
MCV RBC AUTO: 83.3 FL (ref 80–99)
NITRITE UR QL STRIP.AUTO: NEGATIVE
NRBC # BLD: 0 K/UL (ref 0–0.01)
NRBC BLD-RTO: 0 PER 100 WBC
P-R INTERVAL, ECG05: 126 MS
PH UR STRIP: 6.5 [PH] (ref 5–8)
PHOSPHATE SERPL-MCNC: 2.2 MG/DL (ref 2.6–4.7)
PLATELET # BLD AUTO: 242 K/UL (ref 150–400)
PMV BLD AUTO: 8.3 FL (ref 8.9–12.9)
PROT UR STRIP-MCNC: 30 MG/DL
Q-T INTERVAL, ECG07: 342 MS
QRS DURATION, ECG06: 94 MS
QTC CALCULATION (BEZET), ECG08: 438 MS
RBC # BLD AUTO: 4.06 M/UL (ref 4.1–5.7)
RBC #/AREA URNS HPF: NORMAL /HPF (ref 0–5)
SP GR UR REFRACTOMETRY: 1.02 (ref 1–1.03)
UROBILINOGEN UR QL STRIP.AUTO: 1 EU/DL (ref 0.2–1)
VENTRICULAR RATE, ECG03: 99 BPM
WBC # BLD AUTO: 15.3 K/UL (ref 4.1–11.1)
WBC URNS QL MICRO: NORMAL /HPF (ref 0–4)

## 2018-04-16 PROCEDURE — 87186 SC STD MICRODIL/AGAR DIL: CPT | Performed by: INTERNAL MEDICINE

## 2018-04-16 PROCEDURE — 74011250637 HC RX REV CODE- 250/637: Performed by: INTERNAL MEDICINE

## 2018-04-16 PROCEDURE — 74011250636 HC RX REV CODE- 250/636: Performed by: INTERNAL MEDICINE

## 2018-04-16 PROCEDURE — 84100 ASSAY OF PHOSPHORUS: CPT | Performed by: INTERNAL MEDICINE

## 2018-04-16 PROCEDURE — 83735 ASSAY OF MAGNESIUM: CPT | Performed by: INTERNAL MEDICINE

## 2018-04-16 PROCEDURE — 87147 CULTURE TYPE IMMUNOLOGIC: CPT | Performed by: INTERNAL MEDICINE

## 2018-04-16 PROCEDURE — 74011000258 HC RX REV CODE- 258: Performed by: INTERNAL MEDICINE

## 2018-04-16 PROCEDURE — 77030018836 HC SOL IRR NACL ICUM -A

## 2018-04-16 PROCEDURE — 76450000000

## 2018-04-16 PROCEDURE — 85027 COMPLETE CBC AUTOMATED: CPT | Performed by: INTERNAL MEDICINE

## 2018-04-16 PROCEDURE — 81001 URINALYSIS AUTO W/SCOPE: CPT | Performed by: EMERGENCY MEDICINE

## 2018-04-16 PROCEDURE — 36415 COLL VENOUS BLD VENIPUNCTURE: CPT | Performed by: INTERNAL MEDICINE

## 2018-04-16 PROCEDURE — 93970 EXTREMITY STUDY: CPT

## 2018-04-16 PROCEDURE — 87077 CULTURE AEROBIC IDENTIFY: CPT | Performed by: INTERNAL MEDICINE

## 2018-04-16 PROCEDURE — 65660000000 HC RM CCU STEPDOWN

## 2018-04-16 PROCEDURE — 87205 SMEAR GRAM STAIN: CPT | Performed by: INTERNAL MEDICINE

## 2018-04-16 RX ORDER — SODIUM CHLORIDE 0.9 % (FLUSH) 0.9 %
5-10 SYRINGE (ML) INJECTION AS NEEDED
Status: DISCONTINUED | OUTPATIENT
Start: 2018-04-16 | End: 2018-04-30 | Stop reason: HOSPADM

## 2018-04-16 RX ORDER — ASPIRIN 81 MG/1
81 TABLET ORAL DAILY
COMMUNITY

## 2018-04-16 RX ORDER — ZOLPIDEM TARTRATE 5 MG/1
5 TABLET ORAL
Status: DISCONTINUED | OUTPATIENT
Start: 2018-04-16 | End: 2018-04-30 | Stop reason: HOSPADM

## 2018-04-16 RX ORDER — METHADONE HYDROCHLORIDE 10 MG/1
110 TABLET ORAL DAILY
Status: DISCONTINUED | OUTPATIENT
Start: 2018-04-16 | End: 2018-04-30 | Stop reason: HOSPADM

## 2018-04-16 RX ORDER — NAPROXEN SODIUM 220 MG
1320 TABLET ORAL
COMMUNITY
End: 2018-04-30

## 2018-04-16 RX ORDER — ENOXAPARIN SODIUM 100 MG/ML
40 INJECTION SUBCUTANEOUS EVERY 24 HOURS
Status: DISCONTINUED | OUTPATIENT
Start: 2018-04-16 | End: 2018-04-30 | Stop reason: HOSPADM

## 2018-04-16 RX ORDER — ACETAMINOPHEN 325 MG/1
650 TABLET ORAL
Status: DISCONTINUED | OUTPATIENT
Start: 2018-04-16 | End: 2018-04-30 | Stop reason: HOSPADM

## 2018-04-16 RX ORDER — SODIUM CHLORIDE 0.9 % (FLUSH) 0.9 %
5-10 SYRINGE (ML) INJECTION EVERY 8 HOURS
Status: DISCONTINUED | OUTPATIENT
Start: 2018-04-16 | End: 2018-04-30 | Stop reason: HOSPADM

## 2018-04-16 RX ORDER — METHADONE HYDROCHLORIDE 10 MG/ML
110 CONCENTRATE ORAL DAILY
COMMUNITY

## 2018-04-16 RX ORDER — PROCHLORPERAZINE EDISYLATE 5 MG/ML
10 INJECTION INTRAMUSCULAR; INTRAVENOUS
Status: DISCONTINUED | OUTPATIENT
Start: 2018-04-16 | End: 2018-04-30 | Stop reason: HOSPADM

## 2018-04-16 RX ORDER — HYDROMORPHONE HYDROCHLORIDE 2 MG/ML
2 INJECTION, SOLUTION INTRAMUSCULAR; INTRAVENOUS; SUBCUTANEOUS
Status: DISCONTINUED | OUTPATIENT
Start: 2018-04-16 | End: 2018-04-17

## 2018-04-16 RX ORDER — SODIUM CHLORIDE 9 MG/ML
50 INJECTION, SOLUTION INTRAVENOUS CONTINUOUS
Status: DISCONTINUED | OUTPATIENT
Start: 2018-04-16 | End: 2018-04-29

## 2018-04-16 RX ADMIN — SODIUM CHLORIDE 75 ML/HR: 900 INJECTION, SOLUTION INTRAVENOUS at 01:52

## 2018-04-16 RX ADMIN — VANCOMYCIN HYDROCHLORIDE 1500 MG: 500 INJECTION, POWDER, LYOPHILIZED, FOR SOLUTION INTRAVENOUS at 20:01

## 2018-04-16 RX ADMIN — PROCHLORPERAZINE EDISYLATE 10 MG: 5 INJECTION INTRAMUSCULAR; INTRAVENOUS at 02:04

## 2018-04-16 RX ADMIN — HYDROMORPHONE HYDROCHLORIDE 2 MG: 2 INJECTION INTRAMUSCULAR; INTRAVENOUS; SUBCUTANEOUS at 02:04

## 2018-04-16 RX ADMIN — Medication 10 ML: at 14:00

## 2018-04-16 RX ADMIN — VANCOMYCIN HYDROCHLORIDE 1500 MG: 500 INJECTION, POWDER, LYOPHILIZED, FOR SOLUTION INTRAVENOUS at 11:53

## 2018-04-16 RX ADMIN — Medication 10 ML: at 05:18

## 2018-04-16 RX ADMIN — ALUMINUM HYDROXIDE AND MAGNESIUM HYDROXIDE 30 ML: 200; 200 SUSPENSION ORAL at 06:29

## 2018-04-16 RX ADMIN — Medication 10 ML: at 20:06

## 2018-04-16 RX ADMIN — HYDROMORPHONE HYDROCHLORIDE 2 MG: 2 INJECTION INTRAMUSCULAR; INTRAVENOUS; SUBCUTANEOUS at 06:31

## 2018-04-16 RX ADMIN — PROCHLORPERAZINE EDISYLATE 10 MG: 5 INJECTION INTRAMUSCULAR; INTRAVENOUS at 06:36

## 2018-04-16 RX ADMIN — ACETAMINOPHEN 650 MG: 325 TABLET ORAL at 19:57

## 2018-04-16 RX ADMIN — PIPERACILLIN SODIUM AND TAZOBACTAM SODIUM 3.38 G: 3; .375 INJECTION, POWDER, LYOPHILIZED, FOR SOLUTION INTRAVENOUS at 06:50

## 2018-04-16 RX ADMIN — PIPERACILLIN SODIUM AND TAZOBACTAM SODIUM 3.38 G: 3; .375 INJECTION, POWDER, LYOPHILIZED, FOR SOLUTION INTRAVENOUS at 22:50

## 2018-04-16 RX ADMIN — PIPERACILLIN SODIUM AND TAZOBACTAM SODIUM 3.38 G: 3; .375 INJECTION, POWDER, LYOPHILIZED, FOR SOLUTION INTRAVENOUS at 16:38

## 2018-04-16 RX ADMIN — ENOXAPARIN SODIUM 40 MG: 40 INJECTION SUBCUTANEOUS at 20:00

## 2018-04-16 RX ADMIN — Medication 10 ML: at 01:52

## 2018-04-16 RX ADMIN — METHADONE HYDROCHLORIDE 110 MG: 10 TABLET ORAL at 10:09

## 2018-04-16 NOTE — PROGRESS NOTES
Spiritual Care Assessment/Progress Note  [address]      NAME: Daysi White      MRN: [id number]  AGE: 39 y.o. SEX: male  Jewish Affiliation: No Druze   Language: English     4/16/2018     Total Time (in minutes): 16     Spiritual Assessment begun in [facility]  MED SURG 2 through conversation with:         []Patient        [] Family    [] Friend(s)        Reason for Consult: Palliative Care, Initial/Spiritual Assessment     Spiritual beliefs: (Please include comment if needed)     [] Involved in a mason tradition/spiritual practice:     [] Supported by a mason community:      [] Claims no spiritual orientation:      [] Seeking spiritual identity:           [x] Adheres to an individual form of spirituality:      [] Not able to assess:                     Identified resources for coping:      [] Prayer                  [] Devotional reading               [] Music                  [] Guided Imagery     [] Family/friends                 [] Pet visits     [] Other:        Interventions offered during this visit: (See comments for more details)    Patient Interventions: Affirmation of mason, Catharsis/review of pertinent events in supportive environment, Iconic (affirming the presence of God/Higher Power), Initial/Spiritual assessment, patient floor, Prayer (assurance of), Prayer (actual)           Plan of Care:     [] Discuss Spiritual/Cultural needs    [] Support AMD and/or advance care planning process      [] Support grieving process   [] Coordinate Rites/Rituals    [] Coordination with community clergy   [x] No spiritual needs identified at this time   [] Detailed Plan of Care below (See Comments)  [] Make referral to Music Therapy  [] Make referral to Pet Therapy     [] Make referral to Addiction services  [] Make referral to [facility]  [] Make referral to Spiritual Care Partner  [] No future visits requested             Comments: Initial spiritual assessment in 5 Med Surg.   Mr. Ramos Ahr remembered me from a previous visit. Doctor came in right after me, MrMaya Zhao provided permission for me to stay as he spoke to his doctor. Afterwards he shared he has good family support a sister, mom and others. He does not practice any religous belief, he was [place] and shared he has belief in God in his own way. He requested prayer for healing. Provided spiritual presence and prayer. Advised of  Availability.   Visited by: Kavitha Traore [id number] [facility] [place] ([id number])

## 2018-04-16 NOTE — PROGRESS NOTES
Problem: Falls - Risk of  Goal: *Absence of Falls  Document Larry Fall Risk and appropriate interventions in the flowsheet.    Outcome: Progressing Towards Goal  Fall Risk Interventions:  Mobility Interventions: Patient to call before getting OOB         Medication Interventions: Evaluate medications/consider consulting pharmacy, Patient to call before getting OOB    Elimination Interventions: Call light in reach, Urinal in reach, Patient to call for help with toileting needs

## 2018-04-16 NOTE — ED NOTES
TRANSFER - OUT REPORT:    Verbal report given to Dhaval Regan RN (name) on Calos Arce  being transferred to [facility] room 536 (unit) for routine progression of care       Report consisted of patients Situation, Background, Assessment and   Recommendations(SBAR). Information from the following report(s) SBAR, ED Summary, Intake/Output, MAR, Recent Results and Cardiac Rhythm NSR was reviewed with the receiving nurse. Lines:   Peripheral IV 04/15/18 Right Antecubital (Active)   Site Assessment Clean, dry, & intact 4/15/2018  9:25 PM   Phlebitis Assessment 0 4/15/2018  9:25 PM   Infiltration Assessment 0 4/15/2018  9:25 PM   Dressing Status Clean, dry, & intact 4/15/2018  9:25 PM   Dressing Type Tape;Transparent 4/15/2018  9:25 PM   Hub Color/Line Status Pink;Flushed 4/15/2018  9:25 PM   Alcohol Cap Used Yes 4/15/2018  9:25 PM        Opportunity for questions and clarification was provided. Patient transported with:   Monitor, IV fluids, IV antibiotics.

## 2018-04-16 NOTE — PROGRESS NOTES
Primary Nurse Pamela Fernandez, YARELIS and Huong Mariee RN performed a dual skin assessment on this patient Impairment noted- see wound doc flow sheet  Jeremiah score is 20

## 2018-04-16 NOTE — PROGRESS NOTES
[facility]: MED RECONCILIATION    Comments/Recommendations:    Per patient, he takes 6-7 Aleve TID PRN for pain. Pharmacist counseled patient on excessive Aleve use, side effects, and extreme harm from continued high dose use and patient acknowledged. Patient states he only uses aleve for breakthrough pain with his methadone.  Pharmacist verified methadone dose with [employer] via Lukas cunningham patient's counselor. She confirmed that patient has been receiving 110 mg of the liquid formulation daily. Patient has been with the clinic since 1/30/18 and has been on 110 mg since 3/28/18. Medications added:     · ASA 81 mg    Information obtained from: patient, treatment center, rx query    Significant PMH/Disease States:   Past Medical History:   Diagnosis Date    Ill-defined condition     venous stasis ulcers    Obesity (BMI 30-39.9) 4/15/2018       Chief Complaint for this Admission:   Chief Complaint   Patient presents with    Leg Pain       Allergies: Tylenol [acetaminophen]    Prior to Admission Medications:   Prior to Admission Medications   Prescriptions Last Dose Informant Patient Reported? Taking?   aspirin delayed-release 81 mg tablet 4/15/2018 at AM  Yes Yes   Sig: Take 81 mg by mouth daily. methadone (DOLOPHINE) 10 mg/mL solution 4/14/2018 at AM  Yes Yes   Sig: Take 110 mg by mouth daily. naproxen sodium (ALEVE) 220 mg tablet 4/15/2018 at Unknown time  Yes Yes   Sig: Take 1,320 mg by mouth three (3) times daily (with meals).       Facility-Administered Medications: None                    LUIS DANIEL Clancy   Contact: [id number]

## 2018-04-16 NOTE — ED NOTES
Patient being transported at this time to [facility] room 536. Pt sitting on stretcher, in no distress. IV saline locked at this time.

## 2018-04-16 NOTE — PROGRESS NOTES
Bedside and Verbal shift change report given to Ofelia Warren RN (oncoming nurse) by Tristen Dickens RN (offgoing nurse). Report included the following information SBAR and Kardex.

## 2018-04-16 NOTE — PROGRESS NOTES
Wound Care Progress Note:  Initial wound care visit for infected leg wounds. Patient in recliner, alert & in no distress. Assessment:  Patient with extensive non-healing venous stasis ulcers to BL LE. Wounds with foul odor. Weeping serous/yellow drainage. Patient has been doing his dressing changes for the past 2 months after he left wound care clinic due to financial difficulties. Patient reports no further skin issues at this time. Treatment:   Wounds cleansed with NS. Xeroform draped over wound beds. DSD placed over xeroform. Recommendations:  Assessment called to Dr. Eran Appiah- orders obtained. Vascular surgery consult. Wound care daily- please see orders. Will follow. Reconsult as needed.    Nena Vazquez RN  04/16/18

## 2018-04-16 NOTE — PROGRESS NOTES
TRANSFER - IN REPORT:    Verbal report received from [address], RN(name) on Cesia Ureña  being received from [facility] ED(unit) for routine progression of care      Report consisted of patients Situation, Background, Assessment and   Recommendations(SBAR). Information from the following report(s) SBAR, Kardex, Intake/Output, MAR and Recent Results was reviewed with the receiving nurse. Opportunity for questions and clarification was provided. Assessment completed upon patients arrival to unit and care assumed. 56- Dr. Miller Proffer notified of patients arrival to unit.

## 2018-04-16 NOTE — ED NOTES
Report given in SBAR format to Naldo Bevels Paramedic prior to transport. Opportunity given for questions.  Verification of hospital location [facility] and room 536 completed with EMS provider

## 2018-04-16 NOTE — PROGRESS NOTES
[facility] Dosing Services: Antimicrobial Stewardship Progress Note    Consult for antibiotic dosing of Vancomycin by Dr. Payton Livonia  Pharmacist reviewed antibiotic appropriateness for 39year old , male  for indication of leg ulcers/cellulitis  Day of Therapy 1    Plan:  Vancomycin therapy:  Start Vancomycin therapy, with loading dose of 2000 (mg) at 2130 4/15/18 (1500mg plus additiional 500 mg to total 20 mg/kg). Follow with maintenance dose of 1500(mg) at 0930 4/16/18, every 12 hours. Dose calculated to approximate a therapeutic trough of 10-15 mcg/mL. Last trough level / Plan for level:   Pharmacy to follow daily and will make changes to dose and/or frequency based on clinical status. Non-Kinetic Antimicrobial Dosing:   Current Regimen:    Recommendation:   Other Antimicrobial  (not dosed by pharmacist)      Cultures        Serum Creatinine     Lab Results   Component Value Date/Time    Creatinine 1.19 04/15/2018 09:29 PM       Creatinine Clearance Estimated Creatinine Clearance: 90.2 mL/min (based on Cr of 1.19).      Temp   (!) 101.2 °F (38.4 °C)      WBC   Lab Results   Component Value Date/Time    WBC 14.3 (H) 04/15/2018 09:29 PM       H/H   Lab Results   Component Value Date/Time    HGB 12.4 04/15/2018 09:29 PM        Platelets   Lab Results   Component Value Date/Time    PLATELET 773 76/04/3619 09:29 PM          Pharmacist: Meg information: [phone]

## 2018-04-16 NOTE — H&P
[address]  [phone]    Admission History and Physical      NAME:  Jr Solis   :   1975   MRN:  [id number]     PCP:  None     Date/Time:  2018         Subjective:     CHIEF COMPLAINT: Pain     HISTORY OF PRESENT ILLNESS:     Mr. Alexander Ray is a 39 y.o.  male who is admitted with cellulitis and infected ulcers. Mr. Alexander Ray presented to the [facility] Emergency Department this PM complaining of pain: RLE, constant, severe, associated with redness and swelling, like prior leg infections. He has a history of a non-healing, chronic venous stasis ulcer    History obtained from chart review and the patient. Previous records reviewed    Past Medical History:   Diagnosis Date    Ill-defined condition     venous stasis ulcers    Obesity (BMI 30-39.9) 4/15/2018        Past Surgical History:   Procedure Laterality Date    HX CORONARY STENT PLACEMENT         Social History   Substance Use Topics    Smoking status: Current Every Day Smoker     Packs/day: 1.00    Smokeless tobacco: Never Used    Alcohol use No        Family History   Problem Relation Age of Onset    Family history unknown: Yes        Allergies   Allergen Reactions    Tylenol [Acetaminophen] Other (comments)     abd pain           Prior to Admission medications    Medication Sig Start Date End Date Taking? Authorizing Provider   methadone (DOLOPHINE) 10 mg tablet Take 110 mg by mouth daily. Yes Gabriella Hurd MD   ibuprofen (MOTRIN) 600 mg tablet Take 1 Tab by mouth every six (6) hours as needed for Pain.    Yes BRYSON Beltran         Review of Systems:  (bold if positive, if negative)    Gen:  fever, chills,Eyes:  ENT:  CVS:  edemaPulm:  GI:    :    MS:  Skin:  erythemawoundPsych:  Endo:    Hem:  Renal:    Neuro:            Objective:      VITALS:    Vital signs reviewed; most recent are:    Visit Vitals    /74 (BP 1 Location: Left arm, BP Patient Position: At rest;Supine)    Pulse 98    Temp 98.1 °F (36.7 °C)    Resp 20    Ht 5' 7\" (1.702 m)    Wt 102.1 kg (225 lb)    SpO2 97%    BMI 35.24 kg/m2     SpO2 Readings from Last 6 Encounters:   04/16/18 97%   01/23/18 98%   01/15/18 98%   06/25/13 93%            Intake/Output Summary (Last 24 hours) at 04/16/18 3109  Last data filed at 04/16/18 5923   Gross per 24 hour   Intake          3654.25 ml   Output              250 ml   Net          3404.25 ml            Exam:     Physical Exam:    Gen: Well-developed, well-nourished, in no acute distress  HEENT:  Pink conjunctivae, PERRL, hearing intact to voice, moist mucous membranes  Neck: Supple, without masses, thyroid non-tender  Resp: No accessory muscle use, clear breath sounds without wheezes rales or rhonchi  Card: No murmurs, normal S1, S2 without thrills, bruits or peripheral edema, 2+ LE peripheral pulses  Abd:  Soft, non-tender, non-distended, normoactive bowel sounds are present, no palpable organomegaly and no detectable hernias  Lymph:  No cervical or inguinal adenopathy  Musc: No cyanosis or clubbing  Skin: Multiple large ulcers over the lateral surface of the RLE with foul smelling debris in the wound and erythema surrounding and extending up thigh  Neuro:  Cranial nerves are grossly intact, no focal motor weakness, follows commands appropriately  Psych:  Good insight, oriented to person, place and time, alert             Labs:    Recent Labs      04/16/18   0256   WBC  15.3*   HGB  10.8*   HCT  33.8*   PLT  242     Recent Labs      04/16/18   0256  04/15/18   2129   NA   --   133*   K   --   3.8   CL   --   100   CO2   --   24   GLU   --   166*   BUN   --   15   CREA   --   1.19   CA   --   8.5   MG  1.5*   --    PHOS  2.2*   --    ALB   --   3.3*   TBILI   --   0.6   SGOT   --   21   ALT   --   21     No results found for: GLUCPOC  No results for input(s): PH, PCO2, PO2, HCO3, FIO2 in the last 72 hours.   No results for input(s): INR in the last 72 hours. No lab exists for component: INREXT, INREXT    Additional testing:  Chest X-ray: no acute process, visualized by me.   Results not reviewed with Radiologist.       Assessment/Plan:       Principal Problem:    Cellulitis (4/15/2018)   - due to infection from wounds   - has not been compliant with follow up with Wound Care   - continue vancomycin, add Zosyn due to foul smell c/w with anaerobic infection   - wound culture ordered   - consult Wound Care   - get LE venous doppler to check for possible DVT (got one dose of 1mg/kg enoxaparin at [facility], I have ordered usual prophylaxis, will need higher dosing if positive)    Active Problems:    Chronic cutaneous venous stasis ulcer ([address] (1/14/2018)   - Wound Care      HTN (hypertension) (1/14/2018)   - BP okay here, not currently on meds, non-compliant with follow up, doesn't have a PCP      Chronic pain (1/14/2018)   - continue methadone, Pharmacy to confirm dose      Obesity (BMI 30-39.9) (4/15/2018)   - counseled on weight loss       Hyperglycemia (4/15/2018)   - check A1c      Sepsis ([address] (4/15/2018)   - POA, NOT severe   - follow on antibiotics      Tobacco abuse (4/15/2018)   - counseled on cessation, spent 5 minutes (outside of the time documented for this note) solely focused on tobacco cessation counseling            Code status:   - patient is FULL CODE, he does not have an AMD on file      Total time spent with patient: [address] discussed with: Patient, Nursing Staff and Dr. Tanner Arias    Discussed:  Code Status, Care Plan and D/C Planning    Prophylaxis:  Lovenox    Probable Disposition:   PT, OT, RN           ___________________________________________________    Attending Physician: Garrett Schwab, MD

## 2018-04-16 NOTE — ED PROVIDER NOTES
Patient is a 39 y.o. male presenting with leg pain. The history is provided by the patient. Leg Pain    This is a chronic problem. Episode onset: worsening since yesterday. The problem has been gradually worsening. The pain is present in the right upper leg and right lower leg. The pain is at a severity of 10/10. Associated symptoms include stiffness. Pertinent negatives include no back pain and no neck pain. The symptoms are aggravated by palpation. patient with chronic leg ulcers - doing wound care and dressing changes at home    Past Medical History:   Diagnosis Date    Ill-defined condition     venous stasis ulcers       Past Surgical History:   Procedure Laterality Date    HX CORONARY STENT PLACEMENT           Family History:   Problem Relation Age of Onset    Family history unknown: Yes       Social History     Social History    Marital status: SINGLE     Spouse name: N/A    Number of children: N/A    Years of education: N/A     Occupational History    Not on file. Social History Main Topics    Smoking status: Current Every Day Smoker     Packs/day: 1.00    Smokeless tobacco: Never Used    Alcohol use No    Drug use: No    Sexual activity: Not on file     Other Topics Concern    Not on file     Social History Narrative         ALLERGIES: Pcn [penicillins] and Tylenol [acetaminophen]    Review of Systems   Constitutional: Positive for chills and fever. Respiratory: Negative for chest tightness and shortness of breath. Cardiovascular: Positive for leg swelling. Negative for chest pain. Gastrointestinal: Positive for nausea. Negative for abdominal pain and vomiting. Musculoskeletal: Positive for stiffness. Negative for back pain and neck pain. Skin:        Redness of right leg with chronic wound on both lower legs- bandaged   All other systems reviewed and are negative.       Vitals:    04/15/18 2104   BP: 136/67   Pulse: (!) 110   Resp: 24   Temp: (!) 101.2 °F (38.4 °C)   SpO2: 97%   Weight: 102.1 kg (225 lb)   Height: 5' 7\" (1.702 m)            Physical Exam   Constitutional: He is oriented to person, place, and time. He appears well-developed and well-nourished. He appears distressed. HENT:   Head: Normocephalic and atraumatic. Mouth/Throat: Oropharynx is clear and moist.   Eyes: Conjunctivae and EOM are normal. Pupils are equal, round, and reactive to light. Neck: Normal range of motion. Cardiovascular: Regular rhythm, normal heart sounds and intact distal pulses. Tachycardia present. No murmur heard. Pulmonary/Chest: Effort normal and breath sounds normal. No stridor. No respiratory distress. Abdominal: Soft. Bowel sounds are normal. There is no tenderness. Musculoskeletal: Normal range of motion. He exhibits tenderness. He exhibits no edema. Neurological: He is alert and oriented to person, place, and time. No cranial nerve deficit. Skin: Skin is warm and dry. He is not diaphoretic. There is erythema. Bandages over bilateral lower legs   Psychiatric: He has a normal mood and affect. Nursing note and vitals reviewed. MDM  Number of Diagnoses or Management Options  Cellulitis of right lower extremity:   Diagnosis management comments: Right leg swelling - cellulitis and possible DVT - will need admit for cellulitis and possible sepsis. Cover with vancomycin and lovenox   Doppler can be done in the morning       Amount and/or Complexity of Data Reviewed  Clinical lab tests: ordered and reviewed  Tests in the radiology section of CPT®: reviewed and ordered  Discuss the patient with other providers: yes (Dr. Abreu Muscogee to admit)    EK/15/18 @ 21:43 normal EKG, normal sinus rhythm, there are no previous tracings available for comparison, rate 99, normal intervals, no ectopy, no ischemia noted.       ED Course       Procedures         VITALS:   Patient Vitals for the past 8 hrs:   Temp Pulse Resp BP SpO2   04/15/18 2200 - 100 (!) 31 126/63 96 % 04/15/18 2148 - (!) 102 20 - -   04/15/18 2104 (!) 101.2 °F (38.4 °C) (!) 110 24 136/67 97 %                  Recent Results (from the past 24 hour(s))   CBC WITH AUTOMATED DIFF    Collection Time: 04/15/18  9:29 PM   Result Value Ref Range    WBC 14.3 (H) 4.1 - 11.1 K/uL    RBC 4.55 4.10 - 5.70 M/uL    HGB 12.4 12.1 - 17.0 g/dL    HCT 37.6 36.6 - 50.3 %    MCV 82.6 80.0 - 99.0 FL    MCH 27.3 26.0 - 34.0 PG    MCHC 33.0 30.0 - 36.5 g/dL    RDW 14.8 (H) 11.5 - 14.5 %    PLATELET 853 707 - 360 K/uL    MPV 8.1 (L) 8.9 - 12.9 FL    NEUTROPHILS 93 (H) 32 - 75 %    LYMPHOCYTES 4 (L) 12 - 49 %    MONOCYTES 3 (L) 5 - 13 %    EOSINOPHILS 0 0 - 7 %    BASOPHILS 0 0 - 1 %    ABS. NEUTROPHILS 13.3 (H) 1.8 - 8.0 K/UL    ABS. LYMPHOCYTES 0.6 K/UL    ABS. MONOCYTES 0.4 0.0 - 1.0 K/UL    ABS. EOSINOPHILS 0.0 0.0 - 0.4 K/UL    ABS. BASOPHILS 0.0 0.0 - 0.1 K/UL    DF SMEAR SCANNED      RBC COMMENTS NORMOCYTIC, NORMOCHROMIC      XXWBCSUS 0     METABOLIC PANEL, COMPREHENSIVE    Collection Time: 04/15/18  9:29 PM   Result Value Ref Range    Sodium 133 (L) 136 - 145 mmol/L    Potassium 3.8 3.5 - 5.1 mmol/L    Chloride 100 97 - 108 mmol/L    CO2 24 21 - 32 mmol/L    Anion gap 9 5 - 15 mmol/L    Glucose 166 (H) 65 - 100 mg/dL    BUN 15 6 - 20 MG/DL    Creatinine 1.19 0.70 - 1.30 MG/DL    BUN/Creatinine ratio 13 12 - 20      GFR est AA >60 >60 ml/min/1.73m2    GFR est non-AA >60 >60 ml/min/1.73m2    Calcium 8.5 8.5 - 10.1 MG/DL    Bilirubin, total 0.6 0.2 - 1.0 MG/DL    ALT (SGPT) 21 12 - 78 U/L    AST (SGOT) 21 15 - 37 U/L    Alk.  phosphatase 80 45 - 117 U/L    Protein, total 7.0 6.4 - 8.2 g/dL    Albumin 3.3 (L) 3.5 - 5.0 g/dL    Globulin 3.7 2.0 - 4.0 g/dL    A-G Ratio 0.9 (L) 1.1 - 2.2     LACTIC ACID    Collection Time: 04/15/18  9:29 PM   Result Value Ref Range    Lactic acid 1.3 0.4 - 2.0 MMOL/L   EKG, 12 LEAD, INITIAL    Collection Time: 04/15/18  9:43 PM   Result Value Ref Range    Ventricular Rate 99 BPM    Atrial Rate 99 BPM P-R Interval 126 ms    QRS Duration 94 ms    Q-T Interval 342 ms    QTC Calculation (Bezet) 438 ms    Calculated P Axis 58 degrees    Calculated R Axis 79 degrees    Calculated T Axis 15 degrees    Diagnosis       Normal sinus rhythm  Normal ECG  When compared with ECG of 09-NOV-2009 06:43,  No significant change was found         Xr Chest Port    Result Date: 4/15/2018  Indication: Sepsis Comparison: 11/9/2009 Portable exam of the chest obtained at 2130 demonstrates normal heart size. There is no acute process in the lung fields. The osseous structures are unremarkable. Impression: No acute process.

## 2018-04-16 NOTE — PROGRESS NOTES
Addison Girard [facility] [address]  [address], [place], [address] [facility]  [phone]      Medical Progress Note      NAME: Rama Blake   :  1975  MRM:  [id number]    Date/Time: 2018  1:48 PM       Assessment and Plan:     Cellulitis / Open leg wound / Chronic cutaneous venous stasis ulcer - POA, worsening since he left wound clinic 2 months aog due to financial problems. US negative  for DVT. Vascular surgery consulted for any intervention. Awaiting Cx. ID consulted, for Abx choice and follow up. Sepsis / Leukocytosis / Sinus tachycardia - POA due to wound. NOT severe sepsis. Continue IV and ABx. Anemia - POA, monitor. Unremarkable serologies. Awaiting hemoccult. Hypomagnesemia / Hypophosphatemia - Presumed nutritional, and diuretics. Replete PO and follow. HTN (hypertension) - Not listing any meds. BP low, due to infection and sepsis. Monitor. Chronic pain - Continue usual methadone. Palliative consult for additional leg pain. Prn dilaudid for now    Obesity (BMI 30-39.9) - Advise weight loss. This contributes to his poor LE circulation. Hyperglycemia - Follow sugar. A1c would be unreliable in setting of anemia. Tobacco abuse - Advise cessation. Contributes to poor LE circulation and healing. Subjective:     Chief Complaint:  Leg pain persists, worse with dressing change    ROS:  (bold if positive, if negative)      Tolerating some PT  Tolerating Diet        Objective:     Last 24hrs VS reviewed since prior progress note.  Most recent are:    Visit Vitals    /54 (BP 1 Location: Left arm, BP Patient Position: Sitting)    Pulse (!) 103    Temp 99.9 °F (37.7 °C)    Resp 22    Ht 5' 7\" (1.702 m)    Wt 102.1 kg (225 lb)    SpO2 99%    BMI 35.24 kg/m2     SpO2 Readings from Last 6 Encounters:   18 99%   18 98%   01/15/18 98%   13 93%          Intake/Output Summary (Last 24 hours) at 18 2675  Last data filed at 04/16/18 0749   Gross per 24 hour   Intake          3694.25 ml   Output              750 ml   Net          2944.25 ml        Physical Exam:    Gen:  Obese, in no acute distress  HEENT:  Pink conjunctivae, PERRL, hearing intact to voice, moist mucous membranes  Neck:  Supple, without masses, thyroid non-tender  Resp:  No accessory muscle use, clear breath sounds without wheezes rales or rhonchi  Card:  No murmurs, tachycardic S1, S2 without thrills, bruits or peripheral edema  Abd:  Soft, non-tender, non-distended, normoactive bowel sounds are present, no mass  Lymph:  No cervical or inguinal adenopathy  Musc:  No cyanosis or clubbing  Skin:  Bilateral LE ulcers, in dressing with seapage, skin turgor is good  Neuro:  Cranial nerves are grossly intact, no focal motor weakness, follows commands appropriately  Psych:  Good insight, oriented to person, place and time, alert    Telemetry reviewed:   normal sinus rhythm  __________________________________________________________________  Medications Reviewed: (see below)  Medications:     Current Facility-Administered Medications   Medication Dose Route Frequency    methadone (DOLOPHINE) tablet 110 mg  110 mg Oral DAILY    0.9% sodium chloride infusion  75 mL/hr IntraVENous CONTINUOUS    sodium chloride (NS) flush 5-10 mL  5-10 mL IntraVENous Q8H    sodium chloride (NS) flush 5-10 mL  5-10 mL IntraVENous PRN    acetaminophen (TYLENOL) tablet 650 mg  650 mg Oral Q4H PRN    HYDROmorphone (DILAUDID) injection 2 mg  2 mg IntraVENous Q4H PRN    prochlorperazine (COMPAZINE) injection 10 mg  10 mg IntraVENous Q6H PRN    zolpidem (AMBIEN) tablet 5 mg  5 mg Oral QHS PRN    enoxaparin (LOVENOX) injection 40 mg  40 mg SubCUTAneous Q24H    aluminum-magnesium hydroxide (MAALOX) oral suspension 30 mL  30 mL Oral Q6H PRN    piperacillin-tazobactam (ZOSYN) 3.375 g in 0.9% sodium chloride (MBP/ADV) 100 mL  3.375 g IntraVENous Q8H    [START ON 4/17/2018] Vancomycin trough: 4/17 @ 0830   Other ONCE    sodium chloride (NS) flush 5-10 mL  5-10 mL IntraVENous PRN    vancomycin (VANCOCIN) 1,500 mg in 0.9% sodium chloride 500 mL IVPB  1,500 mg IntraVENous Q12H        Lab Data Reviewed: (see below)  Lab Review:     Recent Labs      04/16/18   0256  04/15/18   2129   WBC  15.3*  14.3*   HGB  10.8*  12.4   HCT  33.8*  37.6   PLT  242  274     Recent Labs      04/16/18   0256  04/15/18   2129   NA   --   133*   K   --   3.8   CL   --   100   CO2   --   24   GLU   --   166*   BUN   --   15   CREA   --   1.19   CA   --   8.5   MG  1.5*   --    PHOS  2.2*   --    ALB   --   3.3*   TBILI   --   0.6   SGOT   --   21   ALT   --   21     No results found for: GLUCPOC  No results for input(s): PH, PCO2, PO2, HCO3, FIO2 in the last 72 hours. No results for input(s): INR in the last 72 hours. No lab exists for component: INREXT  All Micro Results     Procedure Component Value Units Date/Time    CACHORRO, Ranjit Parra [[id number]] Collected:  04/16/18 0016    Order Status:  Completed Specimen:  Wound from Wound Drainage Updated:  04/16/18 0932    CULTURE, BLOOD [[id number]] Collected:  04/15/18 2129    Order Status:  Completed Specimen:  Blood from Blood Updated:  04/16/18 0820          I have reviewed notes of prior 24hr.     Other pertinent lab: none    Total time spent with patient: [address] [place] [id number] discussed with: Patient, Care Manager, Nursing Staff, Consultant/Specialist and >50% of time spent in counseling and coordination of care    Discussed:  Care Plan    Prophylaxis:  Lovenox and H2B/PPI    Disposition:  Home w/Family           ___________________________________________________    Attending Physician: Abundio Sparks MD

## 2018-04-16 NOTE — PROCEDURES
Pooja  *** FINAL REPORT ***    Name: Derrick Anton  MRN: [id number]    Inpatient  : 11 Mar 1975  HIS Order #: [id number]  [address] [facility] Visit #: [id number]  Date: 2018    TYPE OF TEST: Peripheral Venous Testing    REASON FOR TEST  Pain in limb, Limb swelling    Right Leg:-  Deep venous thrombosis:           No  Superficial venous thrombosis:    No  Deep venous insufficiency:        No  Superficial venous insufficiency: No    Left Leg:-  Deep venous thrombosis:           No  Superficial venous thrombosis:    No  Deep venous insufficiency:        No  Superficial venous insufficiency: No      INTERPRETATION/FINDINGS  PROCEDURE:  BILATERAL LE VENOUS DUPLEX. Evaluation of lower extremity veins with ultrasound (B-mode imaging,  pulsed Doppler, color Doppler). Includes the common femoral, deep  femoral, femoral, popliteal, posterior tibial, peroneal, and great  saphenous veins. FINDINGS:  Delora Ny scale and color flow duplex images of the veins in  both lower extremities demonstrate normal compressibility, spontaneous   and augmented flow profiles, and absence of filling defects  throughout the deep and superficial veins in both lower extremities. CONCLUSION:  Bilateral lower extremity venous duplex negative for deep   venous thrombosis or thrombophlebitis in veins visualized. Unable to  evaluate calf veins bilaterally due to wound placement. Technically  difficult sudy due to patient inability to tolerate probe pressure. ADDITIONAL COMMENTS    I have personally reviewed the data relevant to the interpretation of  this  study. TECHNOLOGIST: VENKAT Cooper  Signed: 2018 01:50 PM    PHYSICIAN: Arminda Navas.  Felix Seo MD  Signed: 2018 04:54 PM

## 2018-04-16 NOTE — PROGRESS NOTES
4- CASE MANAGEMENT NOTE:  I met with the pt to determine potential discharge needs. The pt states he lives alone in a 2 story house with a first floor bedroom and 2 entry steps. He is independent with his ADL's, works part-time at odd jobs and drives. He was only able to give me the phone number of his brother, Erica Limon ([phone]). His mother also lives locally, [place], but he did not remember her phone number. He does not have a PCP and I gave him a list of [employer] PCP's and encouraged him to make an appointment. He has prescription drug coverage and gets medications from [facility] [address]. CM will follow and assist with discharge needs as indicated. Care Management Interventions  PCP Verified by CM:  Yes (No PCP-gave list of [facility] PCP's)  Discharge Durable Medical Equipment: No  Physical Therapy Consult: No  Occupational Therapy Consult: No  Speech Therapy Consult: No  Current Support Network: Lives Alone, Own Home  Confirm Follow Up Transport: Family  Plan discussed with Pt/Family/Caregiver: Yes  Discharge Location  Discharge Placement:  (Home)    LEVI Tolbert, CM

## 2018-04-17 LAB
ANION GAP SERPL CALC-SCNC: 9 MMOL/L (ref 5–15)
BUN SERPL-MCNC: 12 MG/DL (ref 6–20)
BUN/CREAT SERPL: 10 (ref 12–20)
CALCIUM SERPL-MCNC: 8.5 MG/DL (ref 8.5–10.1)
CHLORIDE SERPL-SCNC: 101 MMOL/L (ref 97–108)
CHOLEST SERPL-MCNC: 114 MG/DL
CO2 SERPL-SCNC: 22 MMOL/L (ref 21–32)
CREAT SERPL-MCNC: 1.25 MG/DL (ref 0.7–1.3)
DATE LAST DOSE: ABNORMAL
ERYTHROCYTE [DISTWIDTH] IN BLOOD BY AUTOMATED COUNT: 14.7 % (ref 11.5–14.5)
FERRITIN SERPL-MCNC: 285 NG/ML (ref 26–388)
FOLATE SERPL-MCNC: 8 NG/ML (ref 5–21)
GLUCOSE SERPL-MCNC: 124 MG/DL (ref 65–100)
HAPTOGLOB SERPL-MCNC: 441 MG/DL (ref 30–200)
HCT VFR BLD AUTO: 35.3 % (ref 36.6–50.3)
HDLC SERPL-MCNC: 20 MG/DL
HDLC SERPL: 5.7 {RATIO} (ref 0–5)
HGB BLD-MCNC: 11.1 G/DL (ref 12.1–17)
IRON SATN MFR SERPL: 4 % (ref 20–50)
IRON SERPL-MCNC: 9 UG/DL (ref 35–150)
LDH SERPL L TO P-CCNC: 166 U/L (ref 85–241)
LDLC SERPL CALC-MCNC: 60 MG/DL (ref 0–100)
LIPID PROFILE,FLP: ABNORMAL
MAGNESIUM SERPL-MCNC: 1.9 MG/DL (ref 1.6–2.4)
MCH RBC QN AUTO: 26.6 PG (ref 26–34)
MCHC RBC AUTO-ENTMCNC: 31.4 G/DL (ref 30–36.5)
MCV RBC AUTO: 84.7 FL (ref 80–99)
NRBC # BLD: 0 K/UL (ref 0–0.01)
NRBC BLD-RTO: 0 PER 100 WBC
PHOSPHATE SERPL-MCNC: 2 MG/DL (ref 2.6–4.7)
PLATELET # BLD AUTO: 221 K/UL (ref 150–400)
PMV BLD AUTO: 8.5 FL (ref 8.9–12.9)
POTASSIUM SERPL-SCNC: 3.9 MMOL/L (ref 3.5–5.1)
PREALB SERPL-MCNC: 8.3 MG/DL (ref 20–40)
RBC # BLD AUTO: 4.17 M/UL (ref 4.1–5.7)
REPORTED DOSE,DOSE: ABNORMAL UNITS
REPORTED DOSE/TIME,TMG: ABNORMAL
RETICS # AUTO: 0.05 M/UL (ref 0.03–0.1)
RETICS/RBC NFR AUTO: 1.1 % (ref 0.7–2.1)
SODIUM SERPL-SCNC: 132 MMOL/L (ref 136–145)
TIBC SERPL-MCNC: 219 UG/DL (ref 250–450)
TRIGL SERPL-MCNC: 170 MG/DL (ref ?–150)
TSH SERPL DL<=0.05 MIU/L-ACNC: 1.95 UIU/ML (ref 0.36–3.74)
VANCOMYCIN TROUGH SERPL-MCNC: 13.5 UG/ML (ref 5–10)
VIT B12 SERPL-MCNC: 508 PG/ML (ref 193–986)
VLDLC SERPL CALC-MCNC: 34 MG/DL
WBC # BLD AUTO: 10.1 K/UL (ref 4.1–11.1)

## 2018-04-17 PROCEDURE — 65660000000 HC RM CCU STEPDOWN

## 2018-04-17 PROCEDURE — 74011250637 HC RX REV CODE- 250/637: Performed by: NURSE PRACTITIONER

## 2018-04-17 PROCEDURE — 80074 ACUTE HEPATITIS PANEL: CPT | Performed by: INTERNAL MEDICINE

## 2018-04-17 PROCEDURE — 84134 ASSAY OF PREALBUMIN: CPT | Performed by: INTERNAL MEDICINE

## 2018-04-17 PROCEDURE — 82746 ASSAY OF FOLIC ACID SERUM: CPT | Performed by: INTERNAL MEDICINE

## 2018-04-17 PROCEDURE — 85027 COMPLETE CBC AUTOMATED: CPT | Performed by: INTERNAL MEDICINE

## 2018-04-17 PROCEDURE — 82607 VITAMIN B-12: CPT | Performed by: INTERNAL MEDICINE

## 2018-04-17 PROCEDURE — 36415 COLL VENOUS BLD VENIPUNCTURE: CPT | Performed by: INTERNAL MEDICINE

## 2018-04-17 PROCEDURE — 80202 ASSAY OF VANCOMYCIN: CPT | Performed by: INTERNAL MEDICINE

## 2018-04-17 PROCEDURE — 82728 ASSAY OF FERRITIN: CPT | Performed by: INTERNAL MEDICINE

## 2018-04-17 PROCEDURE — 74011250636 HC RX REV CODE- 250/636: Performed by: NURSE PRACTITIONER

## 2018-04-17 PROCEDURE — 80048 BASIC METABOLIC PNL TOTAL CA: CPT | Performed by: INTERNAL MEDICINE

## 2018-04-17 PROCEDURE — 74011000258 HC RX REV CODE- 258: Performed by: INTERNAL MEDICINE

## 2018-04-17 PROCEDURE — 83735 ASSAY OF MAGNESIUM: CPT | Performed by: INTERNAL MEDICINE

## 2018-04-17 PROCEDURE — 74011250637 HC RX REV CODE- 250/637: Performed by: INTERNAL MEDICINE

## 2018-04-17 PROCEDURE — 85045 AUTOMATED RETICULOCYTE COUNT: CPT | Performed by: INTERNAL MEDICINE

## 2018-04-17 PROCEDURE — 74011250636 HC RX REV CODE- 250/636: Performed by: INTERNAL MEDICINE

## 2018-04-17 PROCEDURE — 83540 ASSAY OF IRON: CPT | Performed by: INTERNAL MEDICINE

## 2018-04-17 PROCEDURE — 80061 LIPID PANEL: CPT | Performed by: INTERNAL MEDICINE

## 2018-04-17 PROCEDURE — 83615 LACTATE (LD) (LDH) ENZYME: CPT | Performed by: INTERNAL MEDICINE

## 2018-04-17 PROCEDURE — 84443 ASSAY THYROID STIM HORMONE: CPT | Performed by: INTERNAL MEDICINE

## 2018-04-17 PROCEDURE — 84100 ASSAY OF PHOSPHORUS: CPT | Performed by: INTERNAL MEDICINE

## 2018-04-17 PROCEDURE — 82306 VITAMIN D 25 HYDROXY: CPT | Performed by: INTERNAL MEDICINE

## 2018-04-17 PROCEDURE — 83010 ASSAY OF HAPTOGLOBIN QUANT: CPT | Performed by: INTERNAL MEDICINE

## 2018-04-17 RX ORDER — HYDROMORPHONE HYDROCHLORIDE 2 MG/ML
1 INJECTION, SOLUTION INTRAMUSCULAR; INTRAVENOUS; SUBCUTANEOUS
Status: DISCONTINUED | OUTPATIENT
Start: 2018-04-17 | End: 2018-04-19

## 2018-04-17 RX ORDER — OXYCODONE HYDROCHLORIDE 5 MG/1
15 TABLET ORAL
Status: DISCONTINUED | OUTPATIENT
Start: 2018-04-17 | End: 2018-04-18

## 2018-04-17 RX ADMIN — HYDROMORPHONE HYDROCHLORIDE 2 MG: 2 INJECTION INTRAMUSCULAR; INTRAVENOUS; SUBCUTANEOUS at 14:38

## 2018-04-17 RX ADMIN — HYDROMORPHONE HYDROCHLORIDE 2 MG: 2 INJECTION INTRAMUSCULAR; INTRAVENOUS; SUBCUTANEOUS at 08:18

## 2018-04-17 RX ADMIN — HYDROMORPHONE HYDROCHLORIDE 1 MG: 2 INJECTION INTRAMUSCULAR; INTRAVENOUS; SUBCUTANEOUS at 20:00

## 2018-04-17 RX ADMIN — PIPERACILLIN SODIUM AND TAZOBACTAM SODIUM 3.38 G: 3; .375 INJECTION, POWDER, LYOPHILIZED, FOR SOLUTION INTRAVENOUS at 06:08

## 2018-04-17 RX ADMIN — METHADONE HYDROCHLORIDE 110 MG: 10 TABLET ORAL at 09:01

## 2018-04-17 RX ADMIN — ENOXAPARIN SODIUM 40 MG: 40 INJECTION SUBCUTANEOUS at 20:01

## 2018-04-17 RX ADMIN — Medication 10 ML: at 06:09

## 2018-04-17 RX ADMIN — Medication 10 ML: at 20:02

## 2018-04-17 RX ADMIN — VANCOMYCIN HYDROCHLORIDE 1500 MG: 500 INJECTION, POWDER, LYOPHILIZED, FOR SOLUTION INTRAVENOUS at 09:02

## 2018-04-17 RX ADMIN — OXYCODONE HYDROCHLORIDE 15 MG: 5 TABLET ORAL at 16:28

## 2018-04-17 RX ADMIN — SODIUM CHLORIDE 75 ML/HR: 900 INJECTION, SOLUTION INTRAVENOUS at 06:09

## 2018-04-17 RX ADMIN — VANCOMYCIN HYDROCHLORIDE 1750 MG: 500 INJECTION, POWDER, LYOPHILIZED, FOR SOLUTION INTRAVENOUS at 20:00

## 2018-04-17 RX ADMIN — PIPERACILLIN SODIUM AND TAZOBACTAM SODIUM 3.38 G: 3; .375 INJECTION, POWDER, LYOPHILIZED, FOR SOLUTION INTRAVENOUS at 14:36

## 2018-04-17 RX ADMIN — PIPERACILLIN SODIUM AND TAZOBACTAM SODIUM 3.38 G: 3; .375 INJECTION, POWDER, LYOPHILIZED, FOR SOLUTION INTRAVENOUS at 22:38

## 2018-04-17 NOTE — PROGRESS NOTES
4- CASE MANAGEMENT NOTE:  I met again with the pt as I noticed that he is now showing as Self Pay\" on the census. He showed me his \"Medical Assistance\" card and is a [employer] card that covers [employer] only. The pt stated [facility] has [facility] and was also going to the [address] [place] [address] Road until recently as he cannot afford the gas cost for both. He has been wrapping his own legs at home. He also stated that he worked full-time as a contractor until about 7 months ago and still tries to work several days/week to pay for his food, etc. He is currently living in a family owned house and pays no rent. We talked about the possibility of him being eligible for Social Security Disability and I have asked Advanced Pt.  Advocacy to see the pt to determine eligibility for any assistance program. Mac Mitchell, [address], CM

## 2018-04-17 NOTE — PROGRESS NOTES
[facility] Dosing Services: Antimicrobial Stewardship Daily Doc    Consult for antibiotic dosing of vancomycin by Dr. Kris Juarez   Indication: leg ulcers/cellulitis, sepsis   Day of Therapy 3  ID on-board    Ht Readings from Last 1 Encounters:   04/15/18 170.2 cm (67\")        Wt Readings from Last 1 Encounters:   04/15/18 102.1 kg (225 lb)      Vancomycin therapy:  Current maintenance dose: 1500 mg every 12 hours. Dose calculated to approximate a therapeutic trough of 15-20 mcg/mL due to sepsis  Trough level: 13.5 @ 0816 (drawn ~ 12.25 hours post last dose at 2001)  Plan for level / Adjustment in Therapy: Dose adjusted to 1750 mg every 12 hours; est trough of 16.07, AUC/SCOTT of 542  Dose administration notes: Doses not given appropriately as scheduled - 4/16 AM dose delayed    Other Antimicrobial   (not dosed by pharmacist) Zosyn 3.375g IV every 8 hours   Cultures 4/16 Wound: 2+ GNR, 1+ GPC in pairs, few GPR (Prel)  4/15 Blood: NGTD (Prel)   Serum Creatinine Lab Results   Component Value Date/Time    Creatinine 1.25 04/17/2018 02:44 AM         Creatinine Clearance Estimated Creatinine Clearance: 85.9 mL/min (based on Cr of 1.25).      Temp Temp: 99.4 °F (37.4 °C)       WBC Lab Results   Component Value Date/Time    WBC 10.1 04/17/2018 02:44 AM        H/H Lab Results   Component Value Date/Time    HGB 11.1 (L) 04/17/2018 02:44 AM        Platelets    Lab Results   Component Value Date/Time    PLATELET 020 24/27/7199 02:44 AM            Thank you,  Geri Hopkins, PharmD     Contact: [phone]

## 2018-04-17 NOTE — PROGRESS NOTES
Attempted to draw the Prealbumin lab x2, unsuccessful. Patient stated he wanted the lab drawn with the 4/17/18 am labs.

## 2018-04-17 NOTE — CONSULTS
Palliative Medicine Consult  Javad: [phone] ([phone])    Patient Name: Chanda Vang  YOB: 1975    Date of Initial Consult: 04/17/2018  Reason for Consult: Overwhelming Symptoms  Requesting Provider: Marvin Talavera MD   Primary Care Physician: None     SUMMARY:   Chanda Vang is a 39 y. o. with a past history of obesity, venous stasis, chronic pain, who was admitted on 4/15/2018 from home with a diagnosis of cellulitis of right lower extremity. Patient has history of chronic venous stasis with ulcers [facility] but had began to do his own wound care when he could arrange transportation. Presented to the ED with complaints of increasing pain and foul smelling discharge for the right leg. Was admitted and started on vancomycin and zosyn. Wound cultures growing diphtheroids, GNR, and pseudomonas. Vascular surgery consulted and venous dopplers negative for DVT, and no evidence of arterial insufficiency. Recommend antibiotics, compression and elevation. Current medical issues leading to Palliative Medicine involvement include: overwhelming symptoms, acute pain related to venous stasis ulcers and cellulitis. .    SH: Single, lives alone, works as a contractor. PALLIATIVE DIAGNOSES:   1. Acute right leg pain  2. Chronic pain  3. Physical debility  4. Chronic venous stasis       PLAN:   1. Met with patient and introduced the role of Palliative Medicine. 2. Patient currently sitting up in chair and has unwrapped his right leg and manually expressing the bullae present on his leg. Reports that due to maintenance IVF, the LE swelling has worsening and he feels as though his leg \"is going to pop if I don't relieve the pressure\". Repeats pain as \"1 million/10\", burning and constant. Given increase in edema, would consider stopping IVF as patient is taking p.o. And is just adding to the increase swelling.   3. Reports chronic venous stasis for approximately 7 months to which he has been doing daily wound care for. 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 It was that time he came to the ER. 4.  reviewed- 2 RX and 2 different prescribers in last 2 years--> methadone 10 mg #39 for 3 days (written and filled on 1/15/18) and suboxone 8/2 #20 for 10 days (written and filled on 6/26/17). 5. Currently has IV dilaudid 2 mg every 4 hours as need for pain. Has received three doses in the past 24 hours and reports that it has little effect on his pain. Discussed utilizing oral regimen for better pain control and something that he can transition home on and he is receptive to this. 6. 6 mg IV dilaudid/24 hours = 90 mg MME/24 hours which is equivalent to oxycodone 15 mg every four hours as needed for pain. Will schedule and decrease IV breakthrough dilaudid to 1 mg every four hours for severe pain. 7. Patient reports he has been on and off methadone for chronic pain for over 10 years, and currently goes [employer] daily for his methadone dose of 110 mg daily. 8. Initial consult note routed to primary continuity provider  9.  Communicated plan of care with: Palliative IDT       GOALS OF CARE / TREATMENT PREFERENCES:     GOALS OF CARE: full restorative treatment in an effort to recover and return home  Patient/Health Care Proxy Stated Goals: Cure      TREATMENT PREFERENCES:   Code Status: Full Code    Advance Care Planning:  Advance Care Planning 4/16/2018   Patient's Healthcare Decision Maker is: Verbal statement (Legal Next of Kin remains as decision maker)   Primary Decision Maker Name Bard Emmanuel (friend)    Primary Decision Maker Phone Number [phone]   Primary Decision Maker Relationship to Patient Friend   Confirm Advance Directive None   Patient Would Like to Complete Advance Directive -       Medical Interventions: Full interventions   Other Instructions:   Artificially Administered Nutrition:  (not addressed)     Other:    As far as possible, the palliative care team has discussed with patient / health care proxy about goals of care / treatment preferences for patient. HISTORY:     History obtained from: patient, chart    CHIEF COMPLAINT: right leg pain    HPI/SUBJECTIVE:    The patient is:   [x] Verbal and participatory  [] Non-participatory due to:     Patient has history of chronic venous stasis with ulcers [facility] but had began to do his own wound care when he could arrange transportation. Presented to the ED with complaints of increasing pain and foul smelling discharge for the right leg. Was admitted and started on vancomycin and zosyn. Wound cultures growing diphtheroids, GNR, and pseudomonas. Vascular surgery consulted and venous dopplers negative for DVT, and no evidence of arterial insufficiency. Recommend antibiotics, compression and elevation.       Clinical Pain Assessment (nonverbal scale for severity on nonverbal patients):   Clinical Pain Assessment  Severity: 10  Location: right LE  Character: throbbing, burning  Duration: chronic; worse since Saturday  Effect: limited movement/weight bearing  Factors: walking, increased edema  Frequency: constant          Duration: for how long has pt been experiencing pain (e.g., 2 days, 1 month, years)  Frequency: how often pain is an issue (e.g., several times per day, once every few days, constant)     FUNCTIONAL ASSESSMENT:     Palliative Performance Scale (PPS):  PPS: 70       PSYCHOSOCIAL/SPIRITUAL SCREENING:     Palliative IDT has assessed this patient for cultural preferences / practices and a referral made as appropriate to needs (Cultural Services, Patient Advocacy, Ethics, etc.)    Advance Care Planning:  Advance Care Planning 4/16/2018   Patient's Healthcare Decision Maker is: Verbal statement (Legal Next of Kin remains as decision maker)   Primary Decision Maker Name Oc Mckeon (friend)    Primary Decision Maker Phone Number [phone]   Primary Decision Maker Relationship to Patient Friend   Confirm Advance Directive None   Patient Would Like to Complete Advance Directive -       Any spiritual / Orthodox concerns:  [] Yes /  [x] No    Caregiver Burnout:  [] Yes /  [] No /  [x] No Caregiver Present      Anticipatory grief assessment:   [x] Normal  / [] Maladaptive       ESAS Anxiety:      ESAS Depression:          REVIEW OF SYSTEMS:     Positive and pertinent negative findings in ROS are noted above in HPI. The following systems were [x] reviewed / [] unable to be reviewed as noted in HPI  Other findings are noted below. Systems: constitutional, ears/nose/mouth/throat, respiratory, gastrointestinal, genitourinary, musculoskeletal, integumentary, neurologic, psychiatric, endocrine. Positive findings noted below. Modified ESAS Completed by: provider     Drowsiness: 0     Pain: 10           Dyspnea: 0                    PHYSICAL EXAM:     From RN flowsheet:  Wt Readings from Last 3 Encounters:   04/15/18 225 lb (102.1 kg)   01/23/18 225 lb (102.1 kg)   01/14/18 210 lb (95.3 kg)     Blood pressure 142/88, pulse 92, temperature 99.9 °F (37.7 °C), resp. rate 17, height 5' 7\" (1.702 m), weight 225 lb (102.1 kg), SpO2 100 %. Pain Scale 1: Numeric (0 - 10)  Pain Intensity 1: 10  Pain Onset 1: acute  Pain Location 1: Leg  Pain Orientation 1: Posterior  Pain Description 1: Aching, Burning, Stabbing  Pain Intervention(s) 1: Medication (see MAR)  Last bowel movement, if known:     Constitutional: Uncomfortable appearing, NAD, sitting up in chair  Eyes: pupils equal, anicteric  ENMT: no nasal discharge, moist mucous membranes  Cardiovascular: regular rhythm, +2 pitting COLLEEN in the left leg up to mid tibia, +3 pitting edema in the right leg up to mid tibia with venous stasis changes and bullae present. Respiratory: breathing not labored, symmetric  Gastrointestinal: soft non-tender, +bowel sounds  Musculoskeletal: no deformity, no tenderness to palpation  Skin: warm, dry  Neurologic: following commands, moving all extremities  Psychiatric: full affect, no hallucinations  Other:       HISTORY:     Principal Problem:    Cellulitis (4/15/2018)    Active Problems:    Open leg wound (1/14/2018)      Chronic cutaneous venous stasis ulcer ([address] (1/14/2018)      HTN (hypertension) (1/14/2018)      Chronic pain (1/14/2018)      Obesity (BMI 30-39.9) (4/15/2018)      Hyperglycemia (4/15/2018)      Sepsis ([address] (4/15/2018)      Tobacco abuse (4/15/2018)      Leukocytosis (4/16/2018)      Anemia (4/16/2018)      Hypomagnesemia (4/16/2018)      Hypophosphatemia (4/16/2018)      Past Medical History:   Diagnosis Date    Ill-defined condition     venous stasis ulcers    Obesity (BMI 30-39.9) 4/15/2018      Past Surgical History:   Procedure Laterality Date    HX CORONARY STENT PLACEMENT        Family History   Problem Relation Age of Onset    Family history unknown: Yes      History reviewed, no pertinent family history.   Social History   Substance Use Topics    Smoking status: Current Every Day Smoker     Packs/day: 1.00    Smokeless tobacco: Never Used    Alcohol use No     Allergies   Allergen Reactions    Tylenol [Acetaminophen] Other (comments)     abd pain         Current Facility-Administered Medications   Medication Dose Route Frequency    vancomycin (VANCOCIN) 1,750 mg in 0.9% sodium chloride 500 mL IVPB  1,750 mg IntraVENous Q12H    HYDROmorphone (DILAUDID) injection 1 mg  1 mg IntraVENous Q4H PRN    oxyCODONE IR (ROXICODONE) tablet 15 mg  15 mg Oral Q4H PRN    methadone (DOLOPHINE) tablet 110 mg  110 mg Oral DAILY    0.9% sodium chloride infusion  75 mL/hr IntraVENous CONTINUOUS    sodium chloride (NS) flush 5-10 mL  5-10 mL IntraVENous Q8H    sodium chloride (NS) flush 5-10 mL  5-10 mL IntraVENous PRN    acetaminophen (TYLENOL) tablet 650 mg  650 mg Oral Q4H PRN    prochlorperazine (COMPAZINE) injection 10 mg  10 mg IntraVENous Q6H PRN    zolpidem (AMBIEN) tablet 5 mg  5 mg Oral QHS PRN    enoxaparin (LOVENOX) injection 40 mg  40 mg SubCUTAneous Q24H    aluminum-magnesium hydroxide (MAALOX) oral suspension 30 mL  30 mL Oral Q6H PRN    piperacillin-tazobactam (ZOSYN) 3.375 g in 0.9% sodium chloride (MBP/ADV) 100 mL  3.375 g IntraVENous Q8H    sodium chloride (NS) flush 5-10 mL  5-10 mL IntraVENous PRN          LAB AND IMAGING FINDINGS:     Lab Results   Component Value Date/Time    WBC 10.1 04/17/2018 02:44 AM    HGB 11.1 (L) 04/17/2018 02:44 AM    PLATELET 230 35/60/2635 02:44 AM     Lab Results   Component Value Date/Time    Sodium 132 (L) 04/17/2018 02:44 AM    Potassium 3.9 04/17/2018 02:44 AM    Chloride 101 04/17/2018 02:44 AM    CO2 22 04/17/2018 02:44 AM    BUN 12 04/17/2018 02:44 AM    Creatinine 1.25 04/17/2018 02:44 AM    Calcium 8.5 04/17/2018 02:44 AM    Magnesium 1.9 04/17/2018 02:44 AM    Phosphorus 2.0 (L) 04/17/2018 02:44 AM      Lab Results   Component Value Date/Time    AST (SGOT) 21 04/15/2018 09:29 PM    Alk. phosphatase 80 04/15/2018 09:29 PM    Protein, total 7.0 04/15/2018 09:29 PM    Albumin 3.3 (L) 04/15/2018 09:29 PM    Globulin 3.7 04/15/2018 09:29 PM     No results found for: INR, PTMR, PTP, PT1, PT2, APTT   Lab Results   Component Value Date/Time    Iron 9 (L) 04/17/2018 02:44 AM    TIBC 219 (L) 04/17/2018 02:44 AM    Iron % saturation 4 (L) 04/17/2018 02:44 AM    Ferritin 285 04/17/2018 02:44 AM      No results found for: PH, PCO2, PO2  No components found for: GLPOC   No results found for: CPK, CKMB             Total time: 70 min  Counseling / coordination time, spent as noted above: 60 min  > 50% counseling / coordination?: yes    Prolonged service was provided for  []30 min   []75 min in face to face time in the presence of the patient, spent as noted above.   Time Start: Time End:   Note: this can only be billed with 81617 (initial) or 06305 (follow up). If multiple start / stop times, list each separately.

## 2018-04-17 NOTE — PROGRESS NOTES
[facility] [address]  [address] [facility] [address], [place], [address] [facility]  [phone]      Medical Progress Note      NAME: Lexis Proctor   :  1975  MRM:  [id number]    Date/Time: 2018  1:48 PM       Assessment and Plan:     Cellulitis / Open leg wound / Chronic cutaneous venous stasis ulcer - POA, worsening since he left wound clinic 2 months aog due to financial problems. US negative for DVT. Vascular surgery consulted for any intervention. Awaiting Cx. ID consulted, for Abx choice and follow up. Sepsis / Leukocytosis / Sinus tachycardia - POA due to wound. NOT severe sepsis. Continue IV and ABx. Anemia - POA, monitor. Unremarkable serologies. Awaiting hemoccult. Hypomagnesemia / Hypophosphatemia - Presumed nutritional, and diuretics. Replete PO and follow. HTN (hypertension) - Not listing any meds. BP low, due to infection and sepsis. Monitor. Chronic pain - Continue usual methadone. Palliative consult for additional leg pain. Prn dilaudid for now    Obesity (BMI 30-39.9) - Advise weight loss. This contributes to his poor LE circulation. Hyperglycemia - Follow sugar. A1c would be unreliable in setting of anemia. Tobacco abuse - Advise cessation. Contributes to poor LE circulation and healing. Subjective:     Chief Complaint:  Leg pain persists, worse with dressing change    ROS:  (bold if positive, if negative)      Tolerating some PT  Tolerating Diet        Objective:     Last 24hrs VS reviewed since prior progress note.  Most recent are:    Visit Vitals    /83 (BP 1 Location: Left arm, BP Patient Position: At rest)    Pulse 95    Temp 99 °F (37.2 °C)    Resp 19    Ht 5' 7\" (1.702 m)    Wt 102.1 kg (225 lb)    SpO2 97%    BMI 35.24 kg/m2     SpO2 Readings from Last 6 Encounters:   18 97%   18 98%   01/15/18 98%   13 93%            Intake/Output Summary (Last 24 hours) at 18 1113  Last data filed at 04/17/18 0413   Gross per 24 hour   Intake                0 ml   Output              600 ml   Net             -600 ml        Physical Exam:    Gen:  Obese, in no acute distress  HEENT:  Pink conjunctivae, PERRL, hearing intact to voice, moist mucous membranes  Neck:  Supple, without masses, thyroid non-tender  Resp:  No accessory muscle use, clear breath sounds without wheezes rales or rhonchi  Card:  No murmurs, tachycardic S1, S2 without thrills, bruits or peripheral edema  Abd:  Soft, non-tender, non-distended, normoactive bowel sounds are present, no mass  Lymph:  No cervical or inguinal adenopathy  Musc:  No cyanosis or clubbing  Skin:  Bilateral LE ulcers, in dressing with seapage, skin turgor is good  Neuro:  Cranial nerves are grossly intact, no focal motor weakness, follows commands appropriately  Psych:  Good insight, oriented to person, place and time, alert                      Telemetry reviewed:   normal sinus rhythm  __________________________________________________________________  Medications Reviewed: (see below)  Medications:     Current Facility-Administered Medications   Medication Dose Route Frequency    methadone (DOLOPHINE) tablet 110 mg  110 mg Oral DAILY    0.9% sodium chloride infusion  75 mL/hr IntraVENous CONTINUOUS    sodium chloride (NS) flush 5-10 mL  5-10 mL IntraVENous Q8H    sodium chloride (NS) flush 5-10 mL  5-10 mL IntraVENous PRN    acetaminophen (TYLENOL) tablet 650 mg  650 mg Oral Q4H PRN    HYDROmorphone (DILAUDID) injection 2 mg  2 mg IntraVENous Q4H PRN    prochlorperazine (COMPAZINE) injection 10 mg  10 mg IntraVENous Q6H PRN    zolpidem (AMBIEN) tablet 5 mg  5 mg Oral QHS PRN    enoxaparin (LOVENOX) injection 40 mg  40 mg SubCUTAneous Q24H    aluminum-magnesium hydroxide (MAALOX) oral suspension 30 mL  30 mL Oral Q6H PRN    piperacillin-tazobactam (ZOSYN) 3.375 g in 0.9% sodium chloride (MBP/ADV) 100 mL  3.375 g IntraVENous Q8H    sodium chloride (NS) flush 5-10 mL  5-10 mL IntraVENous PRN    vancomycin (VANCOCIN) 1,500 mg in 0.9% sodium chloride 500 mL IVPB  1,500 mg IntraVENous Q12H        Lab Data Reviewed: (see below)  Lab Review:     Recent Labs      04/17/18   0244  04/16/18   0256  04/15/18   2129   WBC  10.1  15.3*  14.3*   HGB  11.1*  10.8*  12.4   HCT  35.3*  33.8*  37.6   PLT  221  242  274     Recent Labs      04/17/18   0244  04/16/18   0256  04/15/18   2129   NA  132*   --   133*   K  3.9   --   3.8   CL  101   --   100   CO2  22   --   24   GLU  124*   --   166*   BUN  12   --   15   CREA  1.25   --   1.19   CA  8.5   --   8.5   MG  1.9  1.5*   --    PHOS  2.0*  2.2*   --    ALB   --    --   3.3*   TBILI   --    --   0.6   SGOT   --    --   21   ALT   --    --   21     No results found for: GLUCPOC  No results for input(s): PH, PCO2, PO2, HCO3, FIO2 in the last 72 hours. No results for input(s): INR in the last 72 hours. No lab exists for component: INREXT, INREXT  All Micro Results     Procedure Component Value Units Date/Time    CULTURE, Nathaniel Tanya STAIN [[id number]]  (Abnormal) Collected:  04/16/18 0016    Order Status:  Completed Specimen:  Wound from Wound Drainage Updated:  04/17/18 1028     Special Requests: NO SPECIAL REQUESTS        GRAM STAIN OCCASIONAL WBCS SEEN         2+ GRAM NEGATIVE RODS                 1+ GRAM POSITIVE COCCI (IN PAIRS)              FEW GRAM POSITIVE RODS (CORYNEFORM)     Culture result:         HEAVY PSEUDOMONAS SPECIES (A)              HEAVY 2ND GRAM NEGATIVE MICK (A)      HEAVY DIPHTHEROIDS (A)       CULTURE, BLOOD [[id number]] Collected:  04/15/18 2129    Order Status:  Completed Specimen:  Blood from Blood Updated:  04/17/18 8949     Special Requests: NO SPECIAL REQUESTS        Culture result: NO GROWTH AFTER 23 HOURS             I have reviewed notes of prior 24hr.     Other pertinent lab: none    Total time spent with patient: [address] [place] [address] discussed with: Patient, Care Manager, Nursing Staff, Consultant/Specialist and >50% of time spent in counseling and coordination of care    Discussed:  Care Plan    Prophylaxis:  Lovenox and H2B/PPI    Disposition:  Home w/Family           ___________________________________________________    Attending Physician: Sharla Singh MD

## 2018-04-17 NOTE — PROGRESS NOTES
[facility] Progress Note           Silviano Obrien DO    [phone] Office  [phone]  Fax    2018      Assessment & Plan:   1. Right lower extremity cellulitis in the setting of a nonhealing chronic venous stasis ulcers. Preliminary cultures growing diptheroids, GNR's,  and pseudomonas. Continue vanco and pip-tazo. Compression and elevation recommended by vascular surgery. Further recommendations once cultures final          Subjective:     Feeling better    Objective:     Vitals:   Visit Vitals    /83 (BP 1 Location: Left arm, BP Patient Position: At rest)    Pulse 95    Temp 99 °F (37.2 °C)    Resp 19    Ht 5' 7\" (1.702 m)    Wt 102.1 kg (225 lb)    SpO2 97%    BMI 35.24 kg/m2        Tmax:  Temp (24hrs), Av.4 °F (37.4 °C), Min:98.9 °F (37.2 °C), Max:99.9 °F (37.7 °C)        Physical Examination:   General:  Alert, cooperative, no distress   Head:  Normocephalic, atraumatic. Eyes:  Conjunctivae clear   Neck: Supple       Lungs:   No distress. Chest wall:     Heart:     Abdomen:      Extremities: Edema, erythema venous stasis ulcers R>L   Skin:     Neurologic: CNII-XII intact. Labs:        No lab exists for component: ITNL   No results for input(s): CPK, CKMB, TROIQ in the last 72 hours. Recent Labs      18   0244  18   0256  04/15/18   2129   NA  132*   --   133*   K  3.9   --   3.8   CL  101   --   100   CO2  22   --   24   BUN  12   --   15   CREA  1.25   --   1.19   GLU  124*   --   166*   PHOS  2.0*  2.2*   --    MG  1.9  1.5*   --    ALB   --    --   3.3*   WBC  10.1  15.3*  14.3*   HGB  11.1*  10.8*  12.4   HCT  35.3*  33.8*  37.6   PLT  221  242  274     No results for input(s): INR, PTP, APTT in the last 72 hours.     No lab exists for component: INREXT  Needs: urine analysis, urine sodium, protein and creatinine  No results found for: LEANDRA, CREAU      Cultures:     No results found for: SDES  Lab Results   Component Value Date/Time    Culture result: HEAVY PSEUDOMONAS SPECIES (A) 04/16/2018 12:16 AM    Culture result: HEAVY 2ND GRAM NEGATIVE MICK (A) 04/16/2018 12:16 AM    Culture result: HEAVY DIPHTHEROIDS (A) 04/16/2018 12:16 AM       Radiology:     Medications       Current Facility-Administered Medications   Medication Dose Route Frequency Last Dose    methadone (DOLOPHINE) tablet 110 mg  110 mg Oral DAILY 110 mg at 04/17/18 0901    0.9% sodium chloride infusion  75 mL/hr IntraVENous CONTINUOUS 75 mL/hr at 04/17/18 0609    sodium chloride (NS) flush 5-10 mL  5-10 mL IntraVENous Q8H 10 mL at 04/17/18 0609    sodium chloride (NS) flush 5-10 mL  5-10 mL IntraVENous PRN      acetaminophen (TYLENOL) tablet 650 mg  650 mg Oral Q4H  mg at 04/16/18 1957    HYDROmorphone (DILAUDID) injection 2 mg  2 mg IntraVENous Q4H PRN 2 mg at 04/17/18 0818    prochlorperazine (COMPAZINE) injection 10 mg  10 mg IntraVENous Q6H PRN 10 mg at 04/16/18 0636    zolpidem (AMBIEN) tablet 5 mg  5 mg Oral QHS PRN      enoxaparin (LOVENOX) injection 40 mg  40 mg SubCUTAneous Q24H 40 mg at 04/16/18 2000    aluminum-magnesium hydroxide (MAALOX) oral suspension 30 mL  30 mL Oral Q6H PRN 30 mL at 04/16/18 0629    piperacillin-tazobactam (ZOSYN) 3.375 g in 0.9% sodium chloride (MBP/ADV) 100 mL  3.375 g IntraVENous Q8H 3.375 g at 04/17/18 0608    sodium chloride (NS) flush 5-10 mL  5-10 mL IntraVENous PRN      vancomycin (VANCOCIN) 1,500 mg in 0.9% sodium chloride 500 mL IVPB  1,500 mg IntraVENous Q12H 1,500 mg at 04/17/18 3997           Case discussed with:Dr Dameon Yang DO

## 2018-04-17 NOTE — PROGRESS NOTES
Patient seen and examined  Full consult to be dictated  Bilateral stasis type wounds   Right worse than left  No DVT  No arterial insufficiency  Recommend Elevation and compression  Antibiotics reasonable  Will check office study to see about other potential modalities.

## 2018-04-18 LAB
CREAT SERPL-MCNC: 2.09 MG/DL (ref 0.7–1.3)
HAV IGM SER QL: NONREACTIVE
HBV CORE IGM SER QL: NONREACTIVE
HBV SURFACE AG SER QL: <0.1 INDEX
HBV SURFACE AG SER QL: NEGATIVE
HCV AB SERPL QL IA: NONREACTIVE
HCV COMMENT,HCGAC: NORMAL
SP1: NORMAL
SP2: NORMAL
SP3: NORMAL

## 2018-04-18 PROCEDURE — 74011250637 HC RX REV CODE- 250/637: Performed by: INTERNAL MEDICINE

## 2018-04-18 PROCEDURE — 74011000258 HC RX REV CODE- 258: Performed by: INTERNAL MEDICINE

## 2018-04-18 PROCEDURE — 74011250636 HC RX REV CODE- 250/636: Performed by: NURSE PRACTITIONER

## 2018-04-18 PROCEDURE — 36415 COLL VENOUS BLD VENIPUNCTURE: CPT | Performed by: INTERNAL MEDICINE

## 2018-04-18 PROCEDURE — 74011250636 HC RX REV CODE- 250/636: Performed by: INTERNAL MEDICINE

## 2018-04-18 PROCEDURE — 82565 ASSAY OF CREATININE: CPT | Performed by: INTERNAL MEDICINE

## 2018-04-18 PROCEDURE — 74011250637 HC RX REV CODE- 250/637: Performed by: NURSE PRACTITIONER

## 2018-04-18 PROCEDURE — 65270000029 HC RM PRIVATE

## 2018-04-18 RX ORDER — NALOXONE HYDROCHLORIDE 0.4 MG/ML
0.4 INJECTION, SOLUTION INTRAMUSCULAR; INTRAVENOUS; SUBCUTANEOUS
Status: DISCONTINUED | OUTPATIENT
Start: 2018-04-18 | End: 2018-04-30 | Stop reason: HOSPADM

## 2018-04-18 RX ORDER — POLYETHYLENE GLYCOL 3350 17 G/17G
17 POWDER, FOR SOLUTION ORAL DAILY
Status: DISCONTINUED | OUTPATIENT
Start: 2018-04-19 | End: 2018-04-30 | Stop reason: HOSPADM

## 2018-04-18 RX ORDER — AMOXICILLIN 250 MG
1 CAPSULE ORAL
Status: DISCONTINUED | OUTPATIENT
Start: 2018-04-18 | End: 2018-04-30 | Stop reason: HOSPADM

## 2018-04-18 RX ORDER — OXYCODONE HYDROCHLORIDE 5 MG/1
20 TABLET ORAL
Status: DISCONTINUED | OUTPATIENT
Start: 2018-04-18 | End: 2018-04-23

## 2018-04-18 RX ADMIN — HYDROMORPHONE HYDROCHLORIDE 1 MG: 2 INJECTION INTRAMUSCULAR; INTRAVENOUS; SUBCUTANEOUS at 21:14

## 2018-04-18 RX ADMIN — HYDROMORPHONE HYDROCHLORIDE 1 MG: 2 INJECTION INTRAMUSCULAR; INTRAVENOUS; SUBCUTANEOUS at 12:33

## 2018-04-18 RX ADMIN — ENOXAPARIN SODIUM 40 MG: 40 INJECTION SUBCUTANEOUS at 20:06

## 2018-04-18 RX ADMIN — HYDROMORPHONE HYDROCHLORIDE 1 MG: 2 INJECTION INTRAMUSCULAR; INTRAVENOUS; SUBCUTANEOUS at 17:01

## 2018-04-18 RX ADMIN — CEFEPIME HYDROCHLORIDE 2 G: 2 INJECTION, POWDER, FOR SOLUTION INTRAVENOUS at 11:34

## 2018-04-18 RX ADMIN — Medication 10 ML: at 21:16

## 2018-04-18 RX ADMIN — OXYCODONE HYDROCHLORIDE 20 MG: 5 TABLET ORAL at 20:07

## 2018-04-18 RX ADMIN — OXYCODONE HYDROCHLORIDE 15 MG: 5 TABLET ORAL at 11:34

## 2018-04-18 RX ADMIN — PIPERACILLIN SODIUM AND TAZOBACTAM SODIUM 3.38 G: 3; .375 INJECTION, POWDER, LYOPHILIZED, FOR SOLUTION INTRAVENOUS at 06:13

## 2018-04-18 RX ADMIN — METHADONE HYDROCHLORIDE 110 MG: 10 TABLET ORAL at 08:13

## 2018-04-18 RX ADMIN — OXYCODONE HYDROCHLORIDE 15 MG: 5 TABLET ORAL at 05:23

## 2018-04-18 RX ADMIN — OXYCODONE HYDROCHLORIDE 15 MG: 5 TABLET ORAL at 15:58

## 2018-04-18 RX ADMIN — OXYCODONE HYDROCHLORIDE 15 MG: 5 TABLET ORAL at 01:16

## 2018-04-18 NOTE — PROGRESS NOTES
[facility] Progress Note           Susana Cordero DO    [phone] Office  [phone]  Fax    2018      Assessment & Plan:   1. Right lower extremity cellulitis in the setting of a nonhealing chronic venous stasis ulcers. Preliminary cultures growing diptheroids, GNR's,  and pseudomonas. Change abx to cefepime. Compression and elevation recommended by vascular surgery. Further recommendations once cultures final  2. KATHARINE. Suspect related to abx. Stop vancomycin and pip-tazo. Will change abx to cephalosporin class           Subjective:     Feeling better    Objective:     Vitals:   Visit Vitals    /84 (BP 1 Location: Left arm, BP Patient Position: Sitting)    Pulse 89    Temp 98.3 °F (36.8 °C)    Resp 18    Ht 5' 7\" (1.702 m)    Wt 102.1 kg (225 lb)    SpO2 100%    BMI 35.24 kg/m2        Tmax:  Temp (24hrs), Av.3 °F (37.4 °C), Min:97.7 °F (36.5 °C), Max:100.5 °F (38.1 °C)        Physical Examination:   General:  Alert, cooperative, no distress   Head:  Normocephalic, atraumatic. Eyes:  Conjunctivae clear   Neck: Supple       Lungs:   No distress. Chest wall:     Heart:     Abdomen:      Extremities: LE's dressed. Serous drainage soaking through dressing on right   Skin:     Neurologic: CNII-XII intact. Labs:        No lab exists for component: ITNL   No results for input(s): CPK, CKMB, TROIQ in the last 72 hours.   Recent Labs      18   0630  18   0244  18   0256  04/15/18   2129   NA   --   132*   --   133*   K   --   3.9   --   3.8   CL   --   101   --   100   CO2   --   22   --   24   BUN   --   12   --   15   CREA  2.09*  1.25   --   1.19   GLU   --   124*   --   166*   PHOS   --   2.0*  2.2*   --    MG   --   1.9  1.5*   --    ALB   --    --    --   3.3*   WBC   --   10.1  15.3*  14.3*   HGB   --   11.1*  10.8*  12.4   HCT   --   35.3*  33.8*  37.6   PLT   --   221  993 037     No results for input(s): INR, PTP, APTT in the last 72 hours.     No lab exists for component: INREXT, INREXT  Needs: urine analysis, urine sodium, protein and creatinine  No results found for: LEANDRA, CREAU      Cultures:     No results found for: SDES  Lab Results   Component Value Date/Time    Culture result: HEAVY PSEUDOMONAS SPECIES (A) 04/16/2018 12:16 AM    Culture result: HEAVY 2ND GRAM NEGATIVE MICK (A) 04/16/2018 12:16 AM    Culture result: HEAVY DIPHTHEROIDS (A) 04/16/2018 12:16 AM       Radiology:     Medications       Current Facility-Administered Medications   Medication Dose Route Frequency Last Dose    Vancomycin: pharmacy dosing by levels   Other Rx Dosing/Monitoring      HYDROmorphone (DILAUDID) injection 1 mg  1 mg IntraVENous Q4H PRN 1 mg at 04/17/18 2000    oxyCODONE IR (ROXICODONE) tablet 15 mg  15 mg Oral Q4H PRN 15 mg at 04/18/18 0523    methadone (DOLOPHINE) tablet 110 mg  110 mg Oral DAILY 110 mg at 04/18/18 0813    0.9% sodium chloride infusion  75 mL/hr IntraVENous CONTINUOUS 75 mL/hr at 04/17/18 0609    sodium chloride (NS) flush 5-10 mL  5-10 mL IntraVENous Q8H Stopped at 04/18/18 0600    sodium chloride (NS) flush 5-10 mL  5-10 mL IntraVENous PRN      acetaminophen (TYLENOL) tablet 650 mg  650 mg Oral Q4H  mg at 04/16/18 1957    prochlorperazine (COMPAZINE) injection 10 mg  10 mg IntraVENous Q6H PRN 10 mg at 04/16/18 0636    zolpidem (AMBIEN) tablet 5 mg  5 mg Oral QHS PRN      enoxaparin (LOVENOX) injection 40 mg  40 mg SubCUTAneous Q24H 40 mg at 04/17/18 2001    aluminum-magnesium hydroxide (MAALOX) oral suspension 30 mL  30 mL Oral Q6H PRN 30 mL at 04/16/18 0629    piperacillin-tazobactam (ZOSYN) 3.375 g in 0.9% sodium chloride (MBP/ADV) 100 mL  3.375 g IntraVENous Q8H 3.375 g at 04/18/18 6769    sodium chloride (NS) flush 5-10 mL  5-10 mL IntraVENous PRN             Case discussed with: Pharmacy      Lesa Isbell DO

## 2018-04-18 NOTE — PROGRESS NOTES
[facility] [address]  [address], [place], [address] [facility]  [phone]      Medical Progress Note      NAME: Cornelius Cheng   :  1975  MRM:  [id number]    Date/Time: 2018  12:22 PM       Assessment and Plan:   1. Cellulitis / Open leg wound / Chronic cutaneous venous stasis ulcer - POA, worsening since he left wound clinic 2 months ago due to financial problems. US negative for DVT. evaluated by Vascular surgery and recommended compression and elevation. Heavy diphtheroids and pseudomonas showed on Cx. ID  Evaluation appreciated. On cefepime.       2. Sepsis / Leukocytosis / Sinus tachycardia - (POA). NOT severe sepsis. Continue IV and ABx as above     3. Anemia, mild and stable. Monitor      4. Chronic pain - Continue usual methadone. Palliative consulted for additional leg pain managment     5. Obesity (BMI 30-39.9) - Advise weight loss.      6. Tobacco abuse - Advise cessation. Contributes to poor LE circulation and healing. 7.  KATHARINE. ? AIN from ABx. Will monitor an if worsening will consult renal.                  Subjective:     Chief Complaint:  Follow up of pt who was admitted with cellulitis. C/o severe leg pain     ROS:  (bold if positive, if negative)      Tolerating PT  Tolerating Diet        Objective:     Last 24hrs VS reviewed since prior progress note.  Most recent are:    Visit Vitals    /76 (BP 1 Location: Left arm, BP Patient Position: Sitting)    Pulse 81    Temp 98.7 °F (37.1 °C)    Resp 18    Ht 5' 7\" (1.702 m)    Wt 102.1 kg (225 lb)    SpO2 98%    BMI 35.24 kg/m2     SpO2 Readings from Last 6 Encounters:   18 98%   18 98%   01/15/18 98%   13 93%          Intake/Output Summary (Last 24 hours) at 18 1222  Last data filed at 18 0525   Gross per 24 hour   Intake                0 ml   Output             2075 ml   Net            -2075 ml        Physical Exam:    Gen:  Well-developed, well-nourished, in no acute distress  HEENT:  Pink conjunctivae, PERRL, hearing intact to voice, moist mucous membranes  Neck:  Supple, without masses, thyroid non-tender  Resp:  No accessory muscle use, clear breath sounds without wheezes rales or rhonchi  Card:  No murmurs, normal S1, S2 without thrills, bruits or peripheral edema  Abd:  Soft, non-tender, non-distended, normoactive bowel sounds are present, no palpable organomegaly and no detectable hernias  Lymph:  No cervical or inguinal adenopathy  Musc:  No cyanosis or clubbing  Skin:  Chronic leg ulcer and hyperemia.    Neuro:  Cranial nerves are grossly intact, no focal motor weakness, follows commands appropriately  Psych:  Good insight, oriented to person, place and time, alert  __________________________________________________________________  Medications Reviewed: (see below)  Medications:     Current Facility-Administered Medications   Medication Dose Route Frequency    cefepime (MAXIPIME) 2 g in 0.9% sodium chloride (MBP/ADV) 100 mL  2 g IntraVENous Q12H    HYDROmorphone (DILAUDID) injection 1 mg  1 mg IntraVENous Q4H PRN    oxyCODONE IR (ROXICODONE) tablet 15 mg  15 mg Oral Q4H PRN    methadone (DOLOPHINE) tablet 110 mg  110 mg Oral DAILY    0.9% sodium chloride infusion  75 mL/hr IntraVENous CONTINUOUS    sodium chloride (NS) flush 5-10 mL  5-10 mL IntraVENous Q8H    sodium chloride (NS) flush 5-10 mL  5-10 mL IntraVENous PRN    acetaminophen (TYLENOL) tablet 650 mg  650 mg Oral Q4H PRN    prochlorperazine (COMPAZINE) injection 10 mg  10 mg IntraVENous Q6H PRN    zolpidem (AMBIEN) tablet 5 mg  5 mg Oral QHS PRN    enoxaparin (LOVENOX) injection 40 mg  40 mg SubCUTAneous Q24H    aluminum-magnesium hydroxide (MAALOX) oral suspension 30 mL  30 mL Oral Q6H PRN    sodium chloride (NS) flush 5-10 mL  5-10 mL IntraVENous PRN        Lab Data Reviewed: (see below)  Lab Review:     Recent Labs      04/17/18   0244  04/16/18   0256  04/15/18   2129   WBC  10.1  15.3* 14.3*   HGB  11.1*  10.8*  12.4   HCT  35.3*  33.8*  37.6   PLT  221  242  274     Recent Labs      04/18/18   0630  04/17/18   0244  04/16/18   0256  04/15/18   2129   NA   --   132*   --   133*   K   --   3.9   --   3.8   CL   --   101   --   100   CO2   --   22   --   24   GLU   --   124*   --   166*   BUN   --   12   --   15   CREA  2.09*  1.25   --   1.19   CA   --   8.5   --   8.5   MG   --   1.9  1.5*   --    PHOS   --   2.0*  2.2*   --    ALB   --    --    --   3.3*   TBILI   --    --    --   0.6   SGOT   --    --    --   21   ALT   --    --    --   21     No results found for: GLUCPOC  No results for input(s): PH, PCO2, PO2, HCO3, FIO2 in the last 72 hours. No results for input(s): INR in the last 72 hours. No lab exists for component: INREXT  All Micro Results     Procedure Component Value Units Date/Time    CULTURE, BLOOD [[id number]] Collected:  04/15/18 2129    Order Status:  Completed Specimen:  Blood from Blood Updated:  04/18/18 0759     Special Requests: NO SPECIAL REQUESTS        Culture result: NO GROWTH 2 DAYS       CULTURE, Doloris Maker STAIN [[id number]]  (Abnormal) Collected:  04/16/18 0016    Order Status:  Completed Specimen:  Wound from Wound Drainage Updated:  04/17/18 1028     Special Requests: NO SPECIAL REQUESTS        GRAM STAIN OCCASIONAL WBCS SEEN         2+ GRAM NEGATIVE RODS                 1+ GRAM POSITIVE COCCI (IN PAIRS)              FEW GRAM POSITIVE RODS (CORYNEFORM)     Culture result:         HEAVY PSEUDOMONAS SPECIES (A)              HEAVY 2ND GRAM NEGATIVE MICK (A)      HEAVY DIPHTHEROIDS (A)             I have reviewed notes of prior 24hr. Other pertinent lab:       Total time spent with patient: [place] discussed with: Patient, Nursing Staff and >50% of time spent in counseling and coordination of care    Discussed:  Care Plan    Prophylaxis:  Lovenox    Disposition:  Home w/Family           ___________________________________________________    Attending Physician: Eleanor Turpin MD

## 2018-04-18 NOTE — PROGRESS NOTES
2000 - Pt stated that he had to much fluids today and it made water blisters form on his legs and that he \"pushed\" the blisters down and tore them open so the wounds are draining more. Pt wants to rechange dressings in a little while.     2200 - Pt declined dressing changes stating \"i just got repositioned and comfortable, we can do it later, they are fine for now\"

## 2018-04-18 NOTE — CONSULTS
Palliative Medicine Consult  Javad: [phone] ([phone])    Patient Name: Jacklynn Sicard  YOB: 1975    Date of Initial Consult: 04/17/2018  Reason for Consult: Overwhelming Symptoms  Requesting Provider: Sergey Obrien MD   Primary Care Physician: None     SUMMARY:   Jacklynn Sicard is a 39 y. o. with a past history of obesity, venous stasis, chronic pain, who was admitted on 4/15/2018 from home with a diagnosis of cellulitis of right lower extremity. Patient has history of chronic venous stasis with ulcers that [facility] but had began to do his own wound care when he could arrange transportation. Presented to the ED with complaints of increasing pain and foul smelling discharge for the right leg. Was admitted and started on vancomycin and zosyn. Wound cultures growing diphtheroids, GNR, and pseudomonas. Vascular surgery consulted and venous dopplers negative for DVT, and no evidence of arterial insufficiency. Recommend antibiotics, compression and elevation. Current medical issues leading to Palliative Medicine involvement include: overwhelming symptoms, acute pain related to venous stasis ulcers and cellulitis. .    SH: Single, lives alone, works as a contractor. Interim History:  4/18: Creat increased overnight to 2; antibiotics changed from vanc/zosyn to cefepime for cellulitis. Continues with pain, currently 9/10. PALLIATIVE DIAGNOSES:   1. Acute right leg pain  2. Chronic pain  3. Physical debility  4. Chronic venous stasis       PLAN:   1. Met with patient and introduced the role of Palliative Medicine. 2. Patient currently sitting up in chair with both legs elevated on the bed. Reports that swelling is much improved but is still unable to bear weight on right foot. Current pain level 9/10.  3. Reports chronic venous stasis for approximately 7 months to which he has been doing daily wound care for.  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 It was that time he came to the ER. 4.  reviewed- 2 RX and 2 different prescribers in last 2 years--> methadone 10 mg #39 for 3 days (written and filled on 1/15/18) and suboxone 8/2 #20 for 10 days (written and filled on 6/26/17). 5. Currently has p.o. oxycodone 15 mg every four hours as needed for pain and IV dilaudid 1 mg every four hours for severe pain/breakthrough pain. Reports that the lowest his pain scale got was 8-9/10, but does report that the oral provides better pain control than the IV. Received 5 doses of oxycodone/24 hours and 2 doses of IV dilaudid/24 hours. This is the equivalent to 105 mg MME. Will increase oxycodone dose to 20 mg every four hours as needed for pain based on this. Continue IV dilaudid for breakthrough, although patient states it does little for his pain. Bowel regimen ordered, as well as naloxone. 6. Patient reports he has been on and off methadone for chronic pain for over 10 years, and currently goes [employer] daily for his methadone dose of 110 mg daily. 7. Initial consult note routed to primary continuity provider  8.  Communicated plan of care with: Palliative IDT       GOALS OF CARE / TREATMENT PREFERENCES:     GOALS OF CARE: full restorative treatment in an effort to recover and return home  Patient/Health Care Proxy Stated Goals: Cure      TREATMENT PREFERENCES:   Code Status: Full Code    Advance Care Planning:  Advance Care Planning 4/16/2018   Patient's Healthcare Decision Maker is: Verbal statement (Legal Next of Kin remains as decision maker)   Primary Decision Maker Name Nora Berman (friend)    Primary Decision Maker Phone Number [phone]   Primary Decision Maker Relationship to Patient Friend   Confirm Advance Directive None   Patient Would Like to Complete Advance Directive -       Medical Interventions: Full interventions   Other Instructions:   Artificially Administered Nutrition:  (not addressed)     Other:    As far as possible, the palliative care team has discussed with patient / health care proxy about goals of care / treatment preferences for patient. HISTORY:     History obtained from: patient, chart    CHIEF COMPLAINT: right leg pain    HPI/SUBJECTIVE:    The patient is:   [x] Verbal and participatory  [] Non-participatory due to:     Patient has history of chronic venous stasis with ulcers that he was going to the [address] [place] [address] Road for but had began to do his own wound care when he could arrange transportation. Presented to the ED with complaints of increasing pain and foul smelling discharge for the right leg. Was admitted and started on vancomycin and zosyn. Wound cultures growing diphtheroids, GNR, and pseudomonas. Vascular surgery consulted and venous dopplers negative for DVT, and no evidence of arterial insufficiency. Recommend antibiotics, compression and elevation. 4/18: Currently has p.o. oxycodone 15 mg every four hours as needed for pain and IV dilaudid 1 mg every four hours for severe pain/breakthrough pain. Reports that the lowest his pain scale got was 8-9/10, but does report that the oral provides better pain control than the IV.      Clinical Pain Assessment (nonverbal scale for severity on nonverbal patients):   Clinical Pain Assessment  Severity: 9  Location: right LE  Character: throbbing, burning  Duration: chronic; worse since Saturday  Effect: limited movement/weight bearing  Factors: walking, increased edema  Frequency: constant          Duration: for how long has pt been experiencing pain (e.g., 2 days, 1 month, years)  Frequency: how often pain is an issue (e.g., several times per day, once every few days, constant)     FUNCTIONAL ASSESSMENT:     Palliative Performance Scale (PPS):  PPS: 70       PSYCHOSOCIAL/SPIRITUAL SCREENING:     Palliative IDT has assessed this patient for cultural preferences / practices and a referral made as appropriate to needs (Cultural Services, Patient Advocacy, Ethics, etc.)    Advance Care Planning:  Advance Care Planning 4/16/2018   Patient's Healthcare Decision Maker is: Verbal statement (Legal Next of Kin remains as decision maker)   Primary Decision Maker Name Tiny Passer (friend)    Primary Decision Maker Phone Number [phone]   Primary Decision Maker Relationship to Patient Friend   Confirm Advance Directive None   Patient Would Like to Complete Advance Directive -       Any spiritual / Orthodox concerns:  [] Yes /  [x] No    Caregiver Burnout:  [] Yes /  [] No /  [x] No Caregiver Present      Anticipatory grief assessment:   [x] Normal  / [] Maladaptive       ESAS Anxiety:      ESAS Depression:          REVIEW OF SYSTEMS:     Positive and pertinent negative findings in ROS are noted above in HPI. The following systems were [x] reviewed / [] unable to be reviewed as noted in HPI  Other findings are noted below. Systems: constitutional, ears/nose/mouth/throat, respiratory, gastrointestinal, genitourinary, musculoskeletal, integumentary, neurologic, psychiatric, endocrine. Positive findings noted below. Modified ESAS Completed by: provider     Drowsiness: 0     Pain: 9           Dyspnea: 0                    PHYSICAL EXAM:     From RN flowsheet:  Wt Readings from Last 3 Encounters:   04/17/18 225 lb (102.1 kg)   01/23/18 225 lb (102.1 kg)   01/14/18 210 lb (95.3 kg)     Blood pressure 146/90, pulse 92, temperature 98 °F (36.7 °C), resp. rate 18, height 5' 7\" (1.702 m), weight 225 lb (102.1 kg), SpO2 100 %.     Pain Scale 1: Numeric (0 - 10)  Pain Intensity 1: 9  Pain Onset 1: acute   Pain Location 1: Leg  Pain Orientation 1: Right, Lower  Pain Description 1: Sharp, Burning  Pain Intervention(s) 1: Medication (see MAR)  Last bowel movement, if known:     Constitutional: Uncomfortable appearing, NAD, sitting up in chair  Eyes: pupils equal, anicteric  ENMT: no nasal discharge, moist mucous membranes  Cardiovascular: regular rhythm, +2 pitting COLLEEN in right foot, +1 pitting in left foot; remainder of lower legs wrapped in kerlex up to the knee. Dressings c/d/i. Respiratory: breathing not labored, symmetric  Gastrointestinal: soft non-tender, +bowel sounds  Musculoskeletal: no deformity, no tenderness to palpation  Skin: warm, dry  Neurologic: following commands, moving all extremities  Psychiatric: full affect, no hallucinations  Other:       HISTORY:     Principal Problem:    Cellulitis (4/15/2018)    Active Problems:    Open leg wound (1/14/2018)      Chronic cutaneous venous stasis ulcer ([address] (1/14/2018)      HTN (hypertension) (1/14/2018)      Chronic pain (1/14/2018)      Obesity (BMI 30-39.9) (4/15/2018)      Hyperglycemia (4/15/2018)      Sepsis ([address] (4/15/2018)      Tobacco abuse (4/15/2018)      Leukocytosis (4/16/2018)      Anemia (4/16/2018)      Hypomagnesemia (4/16/2018)      Hypophosphatemia (4/16/2018)      Past Medical History:   Diagnosis Date    Ill-defined condition     venous stasis ulcers    Obesity (BMI 30-39.9) 4/15/2018      Past Surgical History:   Procedure Laterality Date    HX CORONARY STENT PLACEMENT        Family History   Problem Relation Age of Onset    Family history unknown: Yes      History reviewed, no pertinent family history.   Social History   Substance Use Topics    Smoking status: Current Every Day Smoker     Packs/day: 1.00    Smokeless tobacco: Never Used    Alcohol use No     Allergies   Allergen Reactions    Tylenol [Acetaminophen] Other (comments)     abd pain         Current Facility-Administered Medications   Medication Dose Route Frequency    cefepime (MAXIPIME) 2 g in 0.9% sodium chloride (MBP/ADV) 100 mL  2 g IntraVENous Q12H    oxyCODONE IR (ROXICODONE) tablet 20 mg  20 mg Oral Q4H PRN    [START ON 4/19/2018] polyethylene glycol (MIRALAX) packet 17 g  17 g Oral DAILY    senna-docusate (PERICOLACE) 8.6-50 mg per tablet 1 Tab  1 Tab Oral QHS    naloxone (NARCAN) injection 0.4 mg  0.4 mg IntraVENous EVERY 2 MINUTES AS NEEDED    HYDROmorphone (DILAUDID) injection 1 mg  1 mg IntraVENous Q4H PRN    methadone (DOLOPHINE) tablet 110 mg  110 mg Oral DAILY    0.9% sodium chloride infusion  75 mL/hr IntraVENous CONTINUOUS    sodium chloride (NS) flush 5-10 mL  5-10 mL IntraVENous Q8H    sodium chloride (NS) flush 5-10 mL  5-10 mL IntraVENous PRN    acetaminophen (TYLENOL) tablet 650 mg  650 mg Oral Q4H PRN    prochlorperazine (COMPAZINE) injection 10 mg  10 mg IntraVENous Q6H PRN    zolpidem (AMBIEN) tablet 5 mg  5 mg Oral QHS PRN    enoxaparin (LOVENOX) injection 40 mg  40 mg SubCUTAneous Q24H    aluminum-magnesium hydroxide (MAALOX) oral suspension 30 mL  30 mL Oral Q6H PRN    sodium chloride (NS) flush 5-10 mL  5-10 mL IntraVENous PRN          LAB AND IMAGING FINDINGS:     Lab Results   Component Value Date/Time    WBC 10.1 04/17/2018 02:44 AM    HGB 11.1 (L) 04/17/2018 02:44 AM    PLATELET 557 56/51/4493 02:44 AM     Lab Results   Component Value Date/Time    Sodium 132 (L) 04/17/2018 02:44 AM    Potassium 3.9 04/17/2018 02:44 AM    Chloride 101 04/17/2018 02:44 AM    CO2 22 04/17/2018 02:44 AM    BUN 12 04/17/2018 02:44 AM    Creatinine 2.09 (H) 04/18/2018 06:30 AM    Calcium 8.5 04/17/2018 02:44 AM    Magnesium 1.9 04/17/2018 02:44 AM    Phosphorus 2.0 (L) 04/17/2018 02:44 AM      Lab Results   Component Value Date/Time    AST (SGOT) 21 04/15/2018 09:29 PM    Alk.  phosphatase 80 04/15/2018 09:29 PM    Protein, total 7.0 04/15/2018 09:29 PM    Albumin 3.3 (L) 04/15/2018 09:29 PM    Globulin 3.7 04/15/2018 09:29 PM     No results found for: INR, PTMR, PTP, PT1, PT2, APTT   Lab Results   Component Value Date/Time    Iron 9 (L) 04/17/2018 02:44 AM    TIBC 219 (L) 04/17/2018 02:44 AM    Iron % saturation 4 (L) 04/17/2018 02:44 AM    Ferritin 285 04/17/2018 02:44 AM No results found for: PH, PCO2, PO2  No components found for: GLPOC   No results found for: CPK, CKMB             Total time: 35 min  Counseling / coordination time, spent as noted above: 30 min  > 50% counseling / coordination?: yes    Prolonged service was provided for  []30 min   []75 min in face to face time in the presence of the patient, spent as noted above. Time Start:   Time End:   Note: this can only be billed with 82422 (initial) or 30841 (follow up). If multiple start / stop times, list each separately.

## 2018-04-18 NOTE — PROGRESS NOTES
Nutrition Assessment:    RECOMMENDATIONS/INTERVENTION(S):   Diet education consult received- pt not interested. Monitor PO intakes, wt, BG- 4/18 Last BM  Monitor skin integrity    ASSESSMENT:   4/18: 39 yr old male admitted for cellulitis of legs. Consult received for diet education. Pt not interested in diet diet education. Pt states he eats a big breakfast and a big dinner and sometimes will eat something in between (\"like cake or whatever\"). Pt states he doesn't eat any processed foods. Pt has gained ~15-20 lbs over last 3 months, possibly fluid retention, and pt states he hasn't been able to move around as much as normal 2/2 leg pain. Pt ate >75% of lunch. Pt states the food is good but his taste buds seem a little off. He is ordering meals. Pt endorses nausea yesterday, none today. Pt had BM today (4/18). Labs: 132. , 166, 141 mg/dL. On methadone, oxycodone, dilaudid. Palliative following. SUBJECTIVE/OBJECTIVE:   Diet Order: Regular  % Eaten:  No data found. Pertinent Medications: [x] Reviewed    Past Medical History:   Diagnosis Date    Ill-defined condition     venous stasis ulcers    Obesity (BMI 30-39.9) 4/15/2018        Chemistries:  Lab Results   Component Value Date/Time    Sodium 132 (L) 04/17/2018 02:44 AM    Potassium 3.9 04/17/2018 02:44 AM    Chloride 101 04/17/2018 02:44 AM    CO2 22 04/17/2018 02:44 AM    Anion gap 9 04/17/2018 02:44 AM    Glucose 124 (H) 04/17/2018 02:44 AM    BUN 12 04/17/2018 02:44 AM    Creatinine 2.09 (H) 04/18/2018 06:30 AM    BUN/Creatinine ratio 10 (L) 04/17/2018 02:44 AM    GFR est AA 42 (L) 04/18/2018 06:30 AM    GFR est non-AA 35 (L) 04/18/2018 06:30 AM    Calcium 8.5 04/17/2018 02:44 AM    AST (SGOT) 21 04/15/2018 09:29 PM    Alk.  phosphatase 80 04/15/2018 09:29 PM    Protein, total 7.0 04/15/2018 09:29 PM    Albumin 3.3 (L) 04/15/2018 09:29 PM    Globulin 3.7 04/15/2018 09:29 PM    A-G Ratio 0.9 (L) 04/15/2018 09:29 PM    ALT (SGPT) 21 04/15/2018 09:29 PM      Anthropometrics: Height: 5' 7\" (170.2 cm) Weight: 102.1 kg (225 lb)    IBW (%IBW): 67.1 kg (148 lb) ( ) UBW (%UBW): 95.3 kg (210 lb) (  %)    BMI: Body mass index is 35.24 kg/(m^2). This BMI is indicative of:     [] Underweight    [] Normal    [] Overweight    [x]  Obesity    []  Extreme Obesity (BMI>40)    Estimated Nutrition Needs (Based on): 2181 Kcals/day (BMR(1870x1.3)-250) , 82 g (-102g/day(0.8-1.0g/kg)) Protein  Carbohydrate: At Least 130 g/day  Fluids:2100 mL/day (1mL/kg rounded to 50 mL)    Last BM: 4/15   []Active     []Hyperactive  []Hypoactive       [] Absent   BS  Skin:    [] Intact   [] Incision  [] Breakdown   [] DTI   [] Tears/Excoriation/Abrasion  [x]Edema 2+/3+ BLE [] Other: Wt Readings from Last 30 Encounters:   04/17/18 102.1 kg (225 lb)   01/23/18 102.1 kg (225 lb)   01/14/18 95.3 kg (210 lb)   06/25/13 104.3 kg (230 lb)      NUTRITION DIAGNOSES:   Problem:  Undesirable food choices     Etiology: related to       Signs/Symptoms: as evidenced by poor food choices based on diet recall      NUTRITION INTERVENTIONS:  Meals/Snacks: General/healthful diet   Supplements: Commercial supplement              GOAL:   Pt will consume >75% of meals with BG < 120 mg/dL.      Cultural, Faith, or Ethnic Dietary Needs: None     LEARNING NEEDS (Diet, Food/Nutrient-Drug Interaction):    [] None Identified   [x] Identified and Education Provided/Documented   [] Identified and Pt declined/was not appropriate      [x] Interdisciplinary Care Plan Reviewed/Documented    [x] Discharge Needs:    TBD   [] No Nutrition Related Discharge Needs    NUTRITION RISK:   Pt Is At Nutrition Risk  [x]     No Nutrition Risk Identified  []       PT SEEN FOR:    [x]  MD Consult: []Calorie Count      []Diabetic Diet Education        [x]Diet Education     []Electrolyte Management     []General Nutrition Management and Supplements     []Management of Tube Feeding     []TPN Recommendations    []  RN Referral:  []MST score >=2     []Enteral/Parenteral Nutrition PTA     []Pregnant: Gestational DM or Multigestation                 [] Pressure Ulcer      []  Low BMI      []  Length of Stay       [] Dysphagia Diet     [] Ventilator      [] Follow-Up        Previous Recommendations:   [] Implemented          [] Not Implemented          [x] Not Applicable    Previous Goal:   [] Met              [] Progressing Towards Goal              [] Not Progressing Towards Goal   [x] Not Applicable              Artie Tolbert, [address] [place] [address]  Pager: [phone]  Office: [phone]

## 2018-04-19 LAB
ANION GAP SERPL CALC-SCNC: 11 MMOL/L (ref 5–15)
BACTERIA SPEC CULT: ABNORMAL
BASOPHILS # BLD: 0 K/UL (ref 0–0.1)
BASOPHILS NFR BLD: 0 % (ref 0–1)
BUN SERPL-MCNC: 22 MG/DL (ref 6–20)
BUN/CREAT SERPL: 9 (ref 12–20)
CALCIUM SERPL-MCNC: 9.3 MG/DL (ref 8.5–10.1)
CHLORIDE SERPL-SCNC: 101 MMOL/L (ref 97–108)
CO2 SERPL-SCNC: 23 MMOL/L (ref 21–32)
CREAT SERPL-MCNC: 2.38 MG/DL (ref 0.7–1.3)
CREAT UR-MCNC: 82.29 MG/DL
DIFFERENTIAL METHOD BLD: ABNORMAL
EOSINOPHIL # BLD: 0.3 K/UL (ref 0–0.4)
EOSINOPHIL #/AREA URNS HPF: NEGATIVE /[HPF]
EOSINOPHIL NFR BLD: 2 % (ref 0–7)
ERYTHROCYTE [DISTWIDTH] IN BLOOD BY AUTOMATED COUNT: 14.9 % (ref 11.5–14.5)
GLUCOSE SERPL-MCNC: 99 MG/DL (ref 65–100)
GRAM STN SPEC: ABNORMAL
HCT VFR BLD AUTO: 35.4 % (ref 36.6–50.3)
HGB BLD-MCNC: 11.3 G/DL (ref 12.1–17)
IMM GRANULOCYTES # BLD: 0 K/UL
IMM GRANULOCYTES NFR BLD AUTO: 0 %
LYMPHOCYTES # BLD: 2.4 K/UL (ref 0.8–3.5)
LYMPHOCYTES NFR BLD: 18 % (ref 12–49)
MCH RBC QN AUTO: 26.7 PG (ref 26–34)
MCHC RBC AUTO-ENTMCNC: 31.9 G/DL (ref 30–36.5)
MCV RBC AUTO: 83.7 FL (ref 80–99)
METAMYELOCYTES NFR BLD MANUAL: 1 %
MONOCYTES # BLD: 1.3 K/UL (ref 0–1)
MONOCYTES NFR BLD: 10 % (ref 5–13)
MYELOCYTES NFR BLD MANUAL: 3 %
NEUTS SEG # BLD: 8.7 K/UL (ref 1.8–8)
NEUTS SEG NFR BLD: 66 % (ref 32–75)
NRBC # BLD: 0 K/UL (ref 0–0.01)
NRBC BLD-RTO: 0 PER 100 WBC
PLATELET # BLD AUTO: 299 K/UL (ref 150–400)
PMV BLD AUTO: 8.7 FL (ref 8.9–12.9)
POTASSIUM SERPL-SCNC: 5.5 MMOL/L (ref 3.5–5.1)
POTASSIUM UR-SCNC: 18 MMOL/L
PROT UR-MCNC: 54 MG/DL (ref 0–11.9)
PROT/CREAT UR-RTO: 0.7
RBC # BLD AUTO: 4.23 M/UL (ref 4.1–5.7)
RBC MORPH BLD: ABNORMAL
SERVICE CMNT-IMP: ABNORMAL
SODIUM SERPL-SCNC: 135 MMOL/L (ref 136–145)
SODIUM UR-SCNC: 50 MMOL/L
WBC # BLD AUTO: 13.2 K/UL (ref 4.1–11.1)

## 2018-04-19 PROCEDURE — 36415 COLL VENOUS BLD VENIPUNCTURE: CPT | Performed by: INTERNAL MEDICINE

## 2018-04-19 PROCEDURE — 84133 ASSAY OF URINE POTASSIUM: CPT | Performed by: INTERNAL MEDICINE

## 2018-04-19 PROCEDURE — 74011250637 HC RX REV CODE- 250/637: Performed by: NURSE PRACTITIONER

## 2018-04-19 PROCEDURE — 74011250636 HC RX REV CODE- 250/636: Performed by: NURSE PRACTITIONER

## 2018-04-19 PROCEDURE — 80048 BASIC METABOLIC PNL TOTAL CA: CPT | Performed by: INTERNAL MEDICINE

## 2018-04-19 PROCEDURE — 74011000258 HC RX REV CODE- 258: Performed by: INTERNAL MEDICINE

## 2018-04-19 PROCEDURE — 74011250637 HC RX REV CODE- 250/637: Performed by: INTERNAL MEDICINE

## 2018-04-19 PROCEDURE — 74011250636 HC RX REV CODE- 250/636: Performed by: INTERNAL MEDICINE

## 2018-04-19 PROCEDURE — 65270000029 HC RM PRIVATE

## 2018-04-19 PROCEDURE — 85025 COMPLETE CBC W/AUTO DIFF WBC: CPT | Performed by: INTERNAL MEDICINE

## 2018-04-19 PROCEDURE — 87205 SMEAR GRAM STAIN: CPT | Performed by: INTERNAL MEDICINE

## 2018-04-19 PROCEDURE — 84156 ASSAY OF PROTEIN URINE: CPT | Performed by: INTERNAL MEDICINE

## 2018-04-19 PROCEDURE — 84300 ASSAY OF URINE SODIUM: CPT | Performed by: INTERNAL MEDICINE

## 2018-04-19 RX ORDER — HYDROMORPHONE HYDROCHLORIDE 2 MG/ML
0.5 INJECTION, SOLUTION INTRAMUSCULAR; INTRAVENOUS; SUBCUTANEOUS
Status: DISCONTINUED | OUTPATIENT
Start: 2018-04-19 | End: 2018-04-23

## 2018-04-19 RX ORDER — SODIUM POLYSTYRENE SULFONATE 15 G/60ML
30 SUSPENSION ORAL; RECTAL
Status: COMPLETED | OUTPATIENT
Start: 2018-04-19 | End: 2018-04-19

## 2018-04-19 RX ADMIN — CEFEPIME HYDROCHLORIDE 2 G: 2 INJECTION, POWDER, FOR SOLUTION INTRAVENOUS at 00:00

## 2018-04-19 RX ADMIN — OXYCODONE HYDROCHLORIDE 20 MG: 5 TABLET ORAL at 17:00

## 2018-04-19 RX ADMIN — POLYETHYLENE GLYCOL 3350 17 G: 17 POWDER, FOR SOLUTION ORAL at 08:29

## 2018-04-19 RX ADMIN — HYDROMORPHONE HYDROCHLORIDE 0.5 MG: 2 INJECTION INTRAMUSCULAR; INTRAVENOUS; SUBCUTANEOUS at 18:25

## 2018-04-19 RX ADMIN — SODIUM CHLORIDE 75 ML/HR: 900 INJECTION, SOLUTION INTRAVENOUS at 09:40

## 2018-04-19 RX ADMIN — HYDROMORPHONE HYDROCHLORIDE 1 MG: 2 INJECTION INTRAMUSCULAR; INTRAVENOUS; SUBCUTANEOUS at 09:38

## 2018-04-19 RX ADMIN — OXYCODONE HYDROCHLORIDE 20 MG: 5 TABLET ORAL at 04:17

## 2018-04-19 RX ADMIN — OXYCODONE HYDROCHLORIDE 20 MG: 5 TABLET ORAL at 00:01

## 2018-04-19 RX ADMIN — HYDROMORPHONE HYDROCHLORIDE 1 MG: 2 INJECTION INTRAMUSCULAR; INTRAVENOUS; SUBCUTANEOUS at 13:53

## 2018-04-19 RX ADMIN — OXYCODONE HYDROCHLORIDE 20 MG: 5 TABLET ORAL at 08:29

## 2018-04-19 RX ADMIN — OXYCODONE HYDROCHLORIDE 20 MG: 5 TABLET ORAL at 12:50

## 2018-04-19 RX ADMIN — Medication 10 ML: at 21:11

## 2018-04-19 RX ADMIN — CEFEPIME HYDROCHLORIDE 2 G: 2 INJECTION, POWDER, FOR SOLUTION INTRAVENOUS at 11:24

## 2018-04-19 RX ADMIN — METHADONE HYDROCHLORIDE 110 MG: 10 TABLET ORAL at 08:29

## 2018-04-19 RX ADMIN — HYDROMORPHONE HYDROCHLORIDE 1 MG: 2 INJECTION INTRAMUSCULAR; INTRAVENOUS; SUBCUTANEOUS at 01:18

## 2018-04-19 RX ADMIN — OXYCODONE HYDROCHLORIDE 20 MG: 5 TABLET ORAL at 21:08

## 2018-04-19 RX ADMIN — SODIUM POLYSTYRENE SULFONATE 30 G: 15 SUSPENSION ORAL; RECTAL at 09:38

## 2018-04-19 RX ADMIN — HYDROMORPHONE HYDROCHLORIDE 0.5 MG: 2 INJECTION INTRAMUSCULAR; INTRAVENOUS; SUBCUTANEOUS at 22:34

## 2018-04-19 RX ADMIN — CEFEPIME HYDROCHLORIDE 2 G: 2 INJECTION, POWDER, FOR SOLUTION INTRAVENOUS at 23:45

## 2018-04-19 NOTE — CONSULTS
[facility]    Consult  note dictated, [id number]  . See Transcription tab if not in Consult section. KATHARINE ? ATN vs AIN, no eosinophilia or significant leukocyturia makes ATN (? From Vanco) more likely.  He is non-oligurid    Volume overload    Hyperkalemia related to KATHARINE    Venous stasis dsiease with lower ext ulcers    Plan:    Antibiotics changed per ID  Decrease IVF  Urine for protein/ creat/K and NA    Veltassa for hyperkalemia      Arvin Godoy MD  [phone]

## 2018-04-19 NOTE — CONSULTS
[address] Emma Watson  MR#: [id number]  : 1975  ACCOUNT #: [de-identified]   DATE OF SERVICE: 2018    REASON FOR CONSULTATION:  Acute kidney injury. Thank you for allowing me to see this patient. HISTORY OF PRESENT ILLNESS:  This is a 18-year-old white male who was admitted on 04/15/2018 with worsening cellulitis of the right lower leg. He has had chronic cutaneous venous stasis ulcers related to venous stasis disease. Apparently, he has been followed in the wound clinic, but lost insurance and could not continue on. He subsequently developed infections a long these chronic ulcerations. Most recently, he was shown to have diphtheroids gram-negative rods and pseudomonas in these ulcers. In fact, final cultures have come back with pseudomonas as one of the organisms; we are awaiting confirmation of the other second gram-negative rolando. He also has heavy diphtheroids. He had been on Zosyn and vancomycin. On admission, serum creatinine was 1.19. Subsequently, it has risen and as of today is 2.38. We were asked to see him. Moreover, he has become hyperkalemic and his bicarbonate levels have dropped a bit. Of note, in 2018, his serum creatinine was 0.91. PAST MEDICAL HISTORY:  Negative for diabetes mellitus. He does have increase in body weight with a BMI that is elevated. He has history of coronary artery disease status post coronary artery stenting. No diabetes, no stroke, history of drug abuse and had been on maintenance methadone. MEDICATIONS PRIOR TO ADMISSION:  Were ibuprofen and methadone. CURRENT MEDICATIONS:  Cefepime 2 grams every 12 hours improved (he had been on Zosyn and vancomycin), Lovenox, methadone, MiraLax, Venita-Colace. He is also receiving normal saline at 75 mL an hour. ALLERGIES:  TYLENOL. SOCIAL HISTORY:  Remarkable for cigarette use. Does not drink.     FAMILY HISTORY:  Negative for kidney disease that we know of. REVIEW OF SYSTEMS: No longer having fevers or chills, but apparently at those on admission; able to eat. No nausea, vomiting, no abdominal discomfort. He does have bilateral lower extremity edema and from photographs taken, he has multiple ulcerations on the right leg. He has no cough, no shortness of breath, no chest pain, no hemoptysis, hematemesis, no blood in stool. He is walking with a walker, but does have some muscle weakness, generalized, from deconditioning. Skin has wounds on the right leg with erythema. He denies depression. No heat or cold intolerance. No lymphadenopathy. He has edema in the right leg and a little bit in the left. He denies any neurologic symptoms such as seizures, tics headaches, syncope. PHYSICAL EXAMINATION:  GENERAL:  He is a well-developed 27-year-old white male in no acute distress. VITAL SIGNS:  Blood pressure is 153/88 with pulse of 84. He is afebrile 98.2, O2 sat 100% on room air. HEENT:  Shows normocephalic, atraumatic cranium. Oropharynx is clear. Extraocular muscles are intact. NECK:  Supple, without JVD. LUNGS:  Clear. CARDIOVASCULAR:  Regular rate and rhythm, no S3 gallop, no rub. ABDOMEN:  Soft, obese, normoactive bowel sounds, no organomegaly, no abdominal wall edema. He does have trace sacral edema. EXTREMITIES:  Show both legs are wrapped below the knee. The right leg is somewhat dusky at the foot and toes, but not cold. I am unable to view the wounds, but he is grossly edematous on the right. Left has some trace edema. NEUROLOGIC:  Nonfocal sensory examination. Sensory examination of the lower extremities not done, but seems to be intact in the upper extremities. LABORATORY DATA:  Show a sodium of 135, potassium 5.5, chloride 101, CO2 of 23, BUN and creatinine are 22 and 2.38.   Most recent cholesterol done 2 days ago was 114 with an LDL of 60, iron saturation was 4%, hematocrit 35.4 with a hemoglobin of 11.3 and white count 13.2. Interestingly, there is no eosinophilia. Urinalysis shows only 30 mg percent protein with no significant cellular elements, 0-4 white cells per high powered field with 5-100 red blood cells per high powered field, no bacteria. TSH of 1.95. Blood cultures done on 04/15/2018 showed no growth in 3 days. Wound cultures show Providencia rettgeri, Pseudomonas aeruginosa, Klebsiella oxytoca. Hepatitis B profile is negative. Hepatitis C antibody is nonreactive. Vitamin D2 level is pending. ASSESSMENT:    1. Acute kidney injury which appears to be drug induced. Perhaps interstitial nephritis. Perhaps acute tubular necrosis. No hypotension noted. Urine for eosinophils has been sent out. However, the lack of peripheral eosinophilia and the absence of significant leukocyturia suggests that those tests will be negative. This does not preclude the diagnosis of autoimmune neutropenia, but makes it less likely. 2.  Mild volume overload. 3.  Chronic venous stasis disease, especially in the right lower extremity with old ulcers. PLAN:  1. I think we can decrease his IV fluids. He is eating. I do not think we can \"flush his kidneys. \"  2. Urine for sodium, potassium, creatinine and protein. 3.  Agree with cessation of vancomycin and Zosyn. However, if he has interstitial nephritis with penicillins, then he may have some cross reactivity with cephalosporins. I will speak with Dr. Jim Lopez about this. 4.  I think we can hold renal ultrasound for the time being, but if the creatinine worsens then he probably should have a bladder scan done as well as renal ultrasound. Given his age, it is unlikely he will have obstructive uropathy. Further recommendations will follow. Thank you for allowing me to participate in the care of this patient.       MD MILAGROS Kuhn / WALYL  D: 04/19/2018 10:56     T: 04/19/2018 12:05  JOB #: [id number]

## 2018-04-19 NOTE — PROGRESS NOTES
Addison Girard [facility] [address]  [address] [facility], [place], [address] [place] [address]  [phone]      Medical Progress Note      NAME: Daysi White   :  1975  MRM:  [id number]    Date/Time: 2018  12:22 PM       Assessment and Plan:   1. Cellulitis / Open leg wound / Chronic cutaneous venous stasis ulcer - POA, worsening since he left wound clinic 2 months ago due to financial problems. US negative for DVT. evaluated by Vascular surgery and recommended compression and elevation. Heavy diphtheroids, heavy strep, beta hemolytic group C and pseudomonas showed on Cx. ID  Evaluation appreciated. On cefepime.       2. Sepsis / Leukocytosis / Sinus tachycardia - (POA). NOT severe sepsis. Continue IV and ABx as above     3. Anemia, mild and stable. Monitor      4. Chronic pain - Continue usual methadone. Palliative consulted for additional leg pain managment     5. Obesity (BMI 30-39.9) - Advise weight loss.      6. Tobacco abuse - Advise cessation. Contributes to poor LE circulation and healing. 7.  KATHARINE. Possible ATN  from vancomycin. Check urine for eosinophil and consult renal. IVF. Monitor renal function    8. Hyperkalemia. Kayexalate.                  Subjective:     Chief Complaint:  Follow up of pt who was admitted with cellulitis. C/o severe leg pain     ROS:  (bold if positive, if negative)      Tolerating PT  Tolerating Diet        Objective:     Last 24hrs VS reviewed since prior progress note.  Most recent are:    Visit Vitals    /88 (BP 1 Location: Left arm, BP Patient Position: At rest;Sitting)    Pulse 84    Temp 98.2 °F (36.8 °C)    Resp 18    Ht 5' 7\" (1.702 m)    Wt 102 kg (224 lb 13.9 oz)    SpO2 100%    BMI 35.22 kg/m2     SpO2 Readings from Last 6 Encounters:   18 100%   18 98%   01/15/18 98%   13 93%            Intake/Output Summary (Last 24 hours) at 18 0944  Last data filed at 18 0656   Gross per 24 hour   Intake              720 ml   Output             1950 ml   Net            -1230 ml        Physical Exam:    Gen:  Well-developed, well-nourished, in no acute distress  HEENT:  Pink conjunctivae, PERRL, hearing intact to voice, moist mucous membranes  Neck:  Supple, without masses, thyroid non-tender  Resp:  No accessory muscle use, clear breath sounds without wheezes rales or rhonchi  Card:  No murmurs, normal S1, S2 without thrills, bruits or peripheral edema  Abd:  Soft, non-tender, non-distended, normoactive bowel sounds are present, no palpable organomegaly and no detectable hernias  Lymph:  No cervical or inguinal adenopathy  Musc:  No cyanosis or clubbing  Skin:  Chronic leg ulcer and hyperemia.    Neuro:  Cranial nerves are grossly intact, no focal motor weakness, follows commands appropriately  Psych:  Good insight, oriented to person, place and time, alert  __________________________________________________________________  Medications Reviewed: (see below)  Medications:     Current Facility-Administered Medications   Medication Dose Route Frequency    cefepime (MAXIPIME) 2 g in 0.9% sodium chloride (MBP/ADV) 100 mL  2 g IntraVENous Q12H    oxyCODONE IR (ROXICODONE) tablet 20 mg  20 mg Oral Q4H PRN    polyethylene glycol (MIRALAX) packet 17 g  17 g Oral DAILY    senna-docusate (PERICOLACE) 8.6-50 mg per tablet 1 Tab  1 Tab Oral QHS    naloxone (NARCAN) injection 0.4 mg  0.4 mg IntraVENous EVERY 2 MINUTES AS NEEDED    HYDROmorphone (DILAUDID) injection 1 mg  1 mg IntraVENous Q4H PRN    methadone (DOLOPHINE) tablet 110 mg  110 mg Oral DAILY    0.9% sodium chloride infusion  75 mL/hr IntraVENous CONTINUOUS    sodium chloride (NS) flush 5-10 mL  5-10 mL IntraVENous Q8H    sodium chloride (NS) flush 5-10 mL  5-10 mL IntraVENous PRN    acetaminophen (TYLENOL) tablet 650 mg  650 mg Oral Q4H PRN    prochlorperazine (COMPAZINE) injection 10 mg  10 mg IntraVENous Q6H PRN    zolpidem (AMBIEN) tablet 5 mg  5 mg Oral QHS PRN    enoxaparin (LOVENOX) injection 40 mg  40 mg SubCUTAneous Q24H    aluminum-magnesium hydroxide (MAALOX) oral suspension 30 mL  30 mL Oral Q6H PRN    sodium chloride (NS) flush 5-10 mL  5-10 mL IntraVENous PRN        Lab Data Reviewed: (see below)  Lab Review:     Recent Labs      04/19/18   0649  04/17/18   0244   WBC  13.2*  10.1   HGB  11.3*  11.1*   HCT  35.4*  35.3*   PLT  299  221     Recent Labs      04/19/18   0649  04/18/18   0630  04/17/18   0244   NA  135*   --   132*   K  5.5*   --   3.9   CL  101   --   101   CO2  23   --   22   GLU  99   --   124*   BUN  22*   --   12   CREA  2.38*  2.09*  1.25   CA  9.3   --   8.5   MG   --    --   1.9   PHOS   --    --   2.0*     No results found for: GLUCPOC  No results for input(s): PH, PCO2, PO2, HCO3, FIO2 in the last 72 hours. No results for input(s): INR in the last 72 hours. No lab exists for component: Jessica Costain  All Micro Results     Procedure Component Value Units Date/Time    CULTURE, BLOOD [[id number]] Collected:  04/15/18 2129    Order Status:  Completed Specimen:  Blood from Blood Updated:  04/19/18 0714     Special Requests: NO SPECIAL REQUESTS        Culture result: NO GROWTH 3 DAYS       CULTURE, WOUND Erick Ambar STAIN [[id number]]  (Abnormal) Collected:  04/16/18 0016    Order Status:  Completed Specimen:  Wound from Wound Drainage Updated:  04/18/18 1129     Special Requests: NO SPECIAL REQUESTS        GRAM STAIN OCCASIONAL WBCS SEEN         2+ GRAM NEGATIVE RODS                 1+ GRAM POSITIVE COCCI (IN PAIRS)              FEW GRAM POSITIVE RODS (CORYNEFORM)     Culture result:         HEAVY PSEUDOMONAS SPECIES IDENTIFICATION AND SUSCEPTIBILITY TO FOLLOW (A)              HEAVY 2ND GRAM NEGATIVE MICK (A)      HEAVY DIPHTHEROIDS (A)                 HEAVY STREPTOCOCCI, BETA HEMOLYTIC GROUP C Penicillin and ampicillin are drugs of choice for treatment of beta-hemolytic streptococcal infections.  Susceptibility testing of penicillins and beta-lactams approved by the FDA for treatment of beta-hemolytic streptococcal infections need not be performed routinely, because nonsusceptible isolates are extremely rare. CLSI 2012 (A)          I have reviewed notes of prior 24hr. Other pertinent lab:       Total time spent with patient: [address] discussed with: Patient, Nursing Staff and >50% of time spent in counseling and coordination of care    Discussed:  Care Plan    Prophylaxis:  Lovenox    Disposition:  Home w/Family           ___________________________________________________    Attending Physician: Jonathan Artis MD

## 2018-04-19 NOTE — PROGRESS NOTES
Palliative Medicine Consult  Javad: [phone] ([phone])    Patient Name: Olga Jackson  YOB: 1975    Date of Initial Consult: 04/17/2018  Reason for Consult: Overwhelming Symptoms  Requesting Provider: Gerald Mark MD   Primary Care Physician: None     SUMMARY:   Olga Jackson is a 39 y. o. with a past history of obesity, venous stasis, chronic pain, who was admitted on 4/15/2018 from home with a diagnosis of cellulitis of right lower extremity. Patient has history of chronic venous stasis with ulcers that he was going to the [facility] [address] for but had began to do his own wound care when he could arrange transportation. Presented to the ED with complaints of increasing pain and foul smelling discharge for the right leg. Was admitted and started on vancomycin and zosyn. Wound cultures growing diphtheroids, GNR, and pseudomonas. Vascular surgery consulted and venous dopplers negative for DVT, and no evidence of arterial insufficiency. Recommend antibiotics, compression and elevation. Current medical issues leading to Palliative Medicine involvement include: overwhelming symptoms, acute pain related to venous stasis ulcers and cellulitis. .    SH: Single, lives alone, works as a contractor. Interim History:  4/18: Creat increased overnight to 2; antibiotics changed from vanc/zosyn to cefepime for cellulitis. Continues with pain, currently 9/10.     4/19: Creat up to 2.38. Nephrology consulted and believe the increase to be drug induced versus interstitial nephritis versus tubular necrosis. Agreed with change in antibiotics in case increase related to PCN/vanco. Hyperkalemic and given valtessa. Reports pain is \"better today\", thinks the oral pain medication is working better, longer acting. Still utilizing IV dilaudid despite stating that the pain relief \"only last like 15 min\". PALLIATIVE DIAGNOSES:   1. Acute right leg pain  2. Chronic pain  3. Physical debility  4.  Chronic venous stasis       PLAN:   1. Met with patient in follow-up  2. Patient currently sitting up in chair. Has been walking to the bathroom with the aid of a walker. Swelling is better but still present. Pain better controlled at 6-7/10.   3. Reports chronic venous stasis for approximately 7 months to which he has been doing daily wound care for. 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 It was that time he came to the ER. 4.  reviewed- 2 RX and 2 different prescribers in last 2 years--> methadone 10 mg #39 for 3 days (written and filled on 1/15/18) and suboxone 8/2 #20 for 10 days (written and filled on 6/26/17). 5. Currently has p.o. oxycodone 20 mg every four hours as needed for pain and IV dilaudid 1 mg every four hours for severe pain/breakthrough pain. Reports that the lowest his pain scale got was 6/10, but does report that the oral provides better pain control than the IV. Received 5 doses of oxycodone/24 hours and 5 doses of IV dilaudid/24 hours. This is the equivalent to 175 mg MME. Given patient report that dilaudid does not provide much relief and in the hopes of getting patient off IV medications prior to discharge, Will decrease IV dilaudid to 0.5 mg for breakthrough every 4 hours. Will keep oxycodone dose the same and reassess tomorrow. Bowel regimen ordered, as well as naloxone. 6. Patient reports he has been on and off methadone for chronic pain for over 10 years, and currently goes [employer] daily for his methadone dose of 110 mg daily. 7. Initial consult note routed to primary continuity provider  8.  Communicated plan of care with: Palliative IDT       GOALS OF CARE / TREATMENT PREFERENCES:     GOALS OF CARE: full restorative treatment in an effort to recover and return home  Patient/Health Care Proxy Stated Goals: Cure      TREATMENT PREFERENCES:   Code Status: Full Code    Advance Care Planning:  Advance Care Planning 4/16/2018   Patient's Healthcare Decision Maker is: Verbal statement (Legal Next of Kin remains as decision maker)   Primary Decision Maker Name Leopoldo Elizondo (friend)    Primary Decision Maker Phone Number [phone]   Primary Decision Maker Relationship to Patient Friend   Confirm Advance Directive None   Patient Would Like to Complete Advance Directive -       Medical Interventions: Full interventions   Other Instructions:   Artificially Administered Nutrition:  (not addressed)     Other:    As far as possible, the palliative care team has discussed with patient / health care proxy about goals of care / treatment preferences for patient. HISTORY:     History obtained from: patient, chart    CHIEF COMPLAINT: right leg pain    HPI/SUBJECTIVE:    The patient is:   [x] Verbal and participatory  [] Non-participatory due to:     Patient has history of chronic venous stasis with ulcers that he was going to the [address] [place] [address] Road for but had began to do his own wound care when he could arrange transportation. Presented to the ED with complaints of increasing pain and foul smelling discharge for the right leg. Was admitted and started on vancomycin and zosyn. Wound cultures growing diphtheroids, GNR, and pseudomonas. Vascular surgery consulted and venous dopplers negative for DVT, and no evidence of arterial insufficiency. Recommend antibiotics, compression and elevation. 4/18: Currently has p.o. oxycodone 15 mg every four hours as needed for pain and IV dilaudid 1 mg every four hours for severe pain/breakthrough pain. Reports that the lowest his pain scale got was 8-9/10, but does report that the oral provides better pain control than the IV.     4/19: Creat up to 2.38. Nephrology consulted and believe the increase to be drug induced versus interstitial nephritis versus tubular necrosis.  Agreed with change in antibiotics in case increase related to PCN/vanco. Hyperkalemic and given valtessa. Reports pain is \"better today\", thinks the oral pain medication is working better, longer acting. Still utilizing IV dilaudid despite stating that the pain relief from IV dose is minimal..    Clinical Pain Assessment (nonverbal scale for severity on nonverbal patients):   Clinical Pain Assessment  Severity: 6  Location: right LE  Character: throbbing, burning  Duration: chronic; worse since Saturday  Effect: limited movement/weight bearing  Factors: walking, increased edema  Frequency: constant          Duration: for how long has pt been experiencing pain (e.g., 2 days, 1 month, years)  Frequency: how often pain is an issue (e.g., several times per day, once every few days, constant)     FUNCTIONAL ASSESSMENT:     Palliative Performance Scale (PPS):  PPS: 70       PSYCHOSOCIAL/SPIRITUAL SCREENING:     Palliative IDT has assessed this patient for cultural preferences / practices and a referral made as appropriate to needs (Cultural Services, Patient Advocacy, Ethics, etc.)    Advance Care Planning:  Advance Care Planning 4/16/2018   Patient's Healthcare Decision Maker is: Verbal statement (Legal Next of Kin remains as decision maker)   Primary Decision Maker Name Alaina Allan (friend)    Primary Decision Maker Phone Number [phone]   Primary Decision Maker Relationship to Patient Friend   Confirm Advance Directive None   Patient Would Like to Complete Advance Directive -       Any spiritual / Moravian concerns:  [] Yes /  [x] No    Caregiver Burnout:  [] Yes /  [] No /  [x] No Caregiver Present      Anticipatory grief assessment:   [x] Normal  / [] Maladaptive       ESAS Anxiety:      ESAS Depression:          REVIEW OF SYSTEMS:     Positive and pertinent negative findings in ROS are noted above in HPI. The following systems were [x] reviewed / [] unable to be reviewed as noted in HPI  Other findings are noted below.   Systems: constitutional, ears/nose/mouth/throat, respiratory, gastrointestinal, genitourinary, musculoskeletal, integumentary, neurologic, psychiatric, endocrine. Positive findings noted below. Modified ESAS Completed by: provider     Drowsiness: 0     Pain: 6           Dyspnea: 0     Constipation: No     Stool Occurrence(s): 1        PHYSICAL EXAM:     From RN flowsheet:  Wt Readings from Last 3 Encounters:   04/18/18 224 lb 13.9 oz (102 kg)   01/23/18 225 lb (102.1 kg)   01/14/18 210 lb (95.3 kg)     Blood pressure 132/80, pulse 91, temperature 98.5 °F (36.9 °C), resp. rate 18, height 5' 7\" (1.702 m), weight 224 lb 13.9 oz (102 kg), SpO2 98 %. Pain Scale 1: Numeric (0 - 10)  Pain Intensity 1: 8  Pain Onset 1: acute   Pain Location 1: Leg  Pain Orientation 1: Left, Right, Lower  Pain Description 1: Aching, Constant  Pain Intervention(s) 1: Medication (see MAR)  Last bowel movement, if known:     Constitutional: Uncomfortable appearing, NAD, sitting up in chair  Eyes: pupils equal, anicteric  ENMT: no nasal discharge, moist mucous membranes  Cardiovascular: regular rhythm, +2 pitting COLLEEN in right foot, +1 pitting in left foot; remainder of lower legs wrapped in kerlex up to the knee. Dressings c/d/i.    Respiratory: breathing not labored, symmetric  Gastrointestinal: soft non-tender, +bowel sounds  Musculoskeletal: no deformity, no tenderness to palpation  Skin: warm, dry  Neurologic: following commands, moving all extremities  Psychiatric: full affect, no hallucinations  Other:       HISTORY:     Principal Problem:    Cellulitis (4/15/2018)    Active Problems:    Open leg wound (1/14/2018)      Chronic cutaneous venous stasis ulcer ([address] (1/14/2018)      HTN (hypertension) (1/14/2018)      Chronic pain (1/14/2018)      Obesity (BMI 30-39.9) (4/15/2018)      Hyperglycemia (4/15/2018)      Sepsis ([address] (4/15/2018)      Tobacco abuse (4/15/2018)      Leukocytosis (4/16/2018)      Anemia (4/16/2018)      Hypomagnesemia (4/16/2018)      Hypophosphatemia (4/16/2018)      Past Medical History:   Diagnosis Date    Ill-defined condition     venous stasis ulcers    Obesity (BMI 30-39.9) 4/15/2018      Past Surgical History:   Procedure Laterality Date    HX CORONARY STENT PLACEMENT        Family History   Problem Relation Age of Onset    Family history unknown: Yes      History reviewed, no pertinent family history.   Social History   Substance Use Topics    Smoking status: Current Every Day Smoker     Packs/day: 1.00    Smokeless tobacco: Never Used    Alcohol use No     Allergies   Allergen Reactions    Tylenol [Acetaminophen] Other (comments)     abd pain         Current Facility-Administered Medications   Medication Dose Route Frequency    cefepime (MAXIPIME) 2 g in 0.9% sodium chloride (MBP/ADV) 100 mL  2 g IntraVENous Q12H    oxyCODONE IR (ROXICODONE) tablet 20 mg  20 mg Oral Q4H PRN    polyethylene glycol (MIRALAX) packet 17 g  17 g Oral DAILY    senna-docusate (PERICOLACE) 8.6-50 mg per tablet 1 Tab  1 Tab Oral QHS    naloxone (NARCAN) injection 0.4 mg  0.4 mg IntraVENous EVERY 2 MINUTES AS NEEDED    HYDROmorphone (DILAUDID) injection 1 mg  1 mg IntraVENous Q4H PRN    methadone (DOLOPHINE) tablet 110 mg  110 mg Oral DAILY    0.9% sodium chloride infusion  50 mL/hr IntraVENous CONTINUOUS    sodium chloride (NS) flush 5-10 mL  5-10 mL IntraVENous Q8H    sodium chloride (NS) flush 5-10 mL  5-10 mL IntraVENous PRN    acetaminophen (TYLENOL) tablet 650 mg  650 mg Oral Q4H PRN    prochlorperazine (COMPAZINE) injection 10 mg  10 mg IntraVENous Q6H PRN    zolpidem (AMBIEN) tablet 5 mg  5 mg Oral QHS PRN    enoxaparin (LOVENOX) injection 40 mg  40 mg SubCUTAneous Q24H    aluminum-magnesium hydroxide (MAALOX) oral suspension 30 mL  30 mL Oral Q6H PRN    sodium chloride (NS) flush 5-10 mL  5-10 mL IntraVENous PRN          LAB AND IMAGING FINDINGS:     Lab Results   Component Value Date/Time    WBC 13.2 (H) 04/19/2018 06:49 AM    HGB 11.3 (L) 04/19/2018 06:49 AM    PLATELET 839 89/25/8062 06:49 AM     Lab Results   Component Value Date/Time    Sodium 135 (L) 04/19/2018 06:49 AM    Potassium 5.5 (H) 04/19/2018 06:49 AM    Chloride 101 04/19/2018 06:49 AM    CO2 23 04/19/2018 06:49 AM    BUN 22 (H) 04/19/2018 06:49 AM    Creatinine 2.38 (H) 04/19/2018 06:49 AM    Calcium 9.3 04/19/2018 06:49 AM    Magnesium 1.9 04/17/2018 02:44 AM    Phosphorus 2.0 (L) 04/17/2018 02:44 AM      Lab Results   Component Value Date/Time    AST (SGOT) 21 04/15/2018 09:29 PM    Alk. phosphatase 80 04/15/2018 09:29 PM    Protein, total 7.0 04/15/2018 09:29 PM    Albumin 3.3 (L) 04/15/2018 09:29 PM    Globulin 3.7 04/15/2018 09:29 PM     No results found for: INR, PTMR, PTP, PT1, PT2, APTT   Lab Results   Component Value Date/Time    Iron 9 (L) 04/17/2018 02:44 AM    TIBC 219 (L) 04/17/2018 02:44 AM    Iron % saturation 4 (L) 04/17/2018 02:44 AM    Ferritin 285 04/17/2018 02:44 AM      No results found for: PH, PCO2, PO2  No components found for: GLPOC   No results found for: CPK, CKMB             Total time: 25 min  Counseling / coordination time, spent as noted above: 20 min  > 50% counseling / coordination?: yes    Prolonged service was provided for  []30 min   []75 min in face to face time in the presence of the patient, spent as noted above. Time Start:   Time End:   Note: this can only be billed with 11628 (initial) or 44384 (follow up). If multiple start / stop times, list each separately.

## 2018-04-19 NOTE — CDMP QUERY
=>Possible ATN secondary to vancomycin in the setting of increasing creatinine  => Other explanation of clinical findings   => Clinically Undetermined (no explanation for clinical findings)    The medical record reflects the following clinical findings, treatment, and risk factors. Risk Factors:  38 yo M admitted with sepsis 2/2 cellulitis   Clinical Indicators:  4/15 BUN 15 creatinine 1.19 GFR >60   4/19 BUN 22 Creatinine 2.38 GFR 30   4/19 nephro consult states \" KATHARINE ? ATN vs AIN, no eosinophilia or significant leukocyturia makes ATN (? From Vanco) more likely. He is non-oligurid\"     Treatment: IV abx changed     Please clarify and document your clinical opinion in the progress notes and discharge summary including the definitive and/or presumptive diagnosis, (suspected or probable), related to the above clinical findings. Please include clinical findings supporting your diagnosis.     Thank you for your time   [facility] RN/BSN, [address]  Desk:   [phone]   Other:  [phone]

## 2018-04-20 LAB
ALBUMIN SERPL-MCNC: 2.4 G/DL (ref 3.5–5)
ANION GAP SERPL CALC-SCNC: 10 MMOL/L (ref 5–15)
BASOPHILS # BLD: 0 K/UL (ref 0–0.1)
BASOPHILS NFR BLD: 0 % (ref 0–1)
BUN SERPL-MCNC: 23 MG/DL (ref 6–20)
BUN/CREAT SERPL: 10 (ref 12–20)
CALCIUM SERPL-MCNC: 9 MG/DL (ref 8.5–10.1)
CHLORIDE SERPL-SCNC: 99 MMOL/L (ref 97–108)
CO2 SERPL-SCNC: 25 MMOL/L (ref 21–32)
CREAT SERPL-MCNC: 2.28 MG/DL (ref 0.7–1.3)
DIFFERENTIAL METHOD BLD: ABNORMAL
EOSINOPHIL # BLD: 0.1 K/UL (ref 0–0.4)
EOSINOPHIL NFR BLD: 1 % (ref 0–7)
ERYTHROCYTE [DISTWIDTH] IN BLOOD BY AUTOMATED COUNT: 14.7 % (ref 11.5–14.5)
GLUCOSE SERPL-MCNC: 133 MG/DL (ref 65–100)
HCT VFR BLD AUTO: 36.2 % (ref 36.6–50.3)
HGB BLD-MCNC: 11.4 G/DL (ref 12.1–17)
IMM GRANULOCYTES # BLD: 0 K/UL
IMM GRANULOCYTES NFR BLD AUTO: 0 %
LYMPHOCYTES # BLD: 1.5 K/UL (ref 0.8–3.5)
LYMPHOCYTES NFR BLD: 11 % (ref 12–49)
MCH RBC QN AUTO: 26.3 PG (ref 26–34)
MCHC RBC AUTO-ENTMCNC: 31.5 G/DL (ref 30–36.5)
MCV RBC AUTO: 83.4 FL (ref 80–99)
METAMYELOCYTES NFR BLD MANUAL: 1 %
MONOCYTES # BLD: 1.1 K/UL (ref 0–1)
MONOCYTES NFR BLD: 8 % (ref 5–13)
NEUTS BAND NFR BLD MANUAL: 5 % (ref 0–6)
NEUTS SEG # BLD: 10.4 K/UL (ref 1.8–8)
NEUTS SEG NFR BLD: 74 % (ref 32–75)
NRBC # BLD: 0 K/UL (ref 0–0.01)
NRBC BLD-RTO: 0 PER 100 WBC
PHOSPHATE SERPL-MCNC: 3.8 MG/DL (ref 2.6–4.7)
PLATELET # BLD AUTO: 326 K/UL (ref 150–400)
PMV BLD AUTO: 8.7 FL (ref 8.9–12.9)
POTASSIUM SERPL-SCNC: 5.1 MMOL/L (ref 3.5–5.1)
RBC # BLD AUTO: 4.34 M/UL (ref 4.1–5.7)
RBC MORPH BLD: ABNORMAL
SODIUM SERPL-SCNC: 134 MMOL/L (ref 136–145)
WBC # BLD AUTO: 13.2 K/UL (ref 4.1–11.1)

## 2018-04-20 PROCEDURE — 77030022298 HC DRSG ANTIMIC ACTICT S&N -A

## 2018-04-20 PROCEDURE — 36415 COLL VENOUS BLD VENIPUNCTURE: CPT | Performed by: INTERNAL MEDICINE

## 2018-04-20 PROCEDURE — 74011250636 HC RX REV CODE- 250/636: Performed by: NURSE PRACTITIONER

## 2018-04-20 PROCEDURE — 80069 RENAL FUNCTION PANEL: CPT | Performed by: INTERNAL MEDICINE

## 2018-04-20 PROCEDURE — 85025 COMPLETE CBC W/AUTO DIFF WBC: CPT | Performed by: INTERNAL MEDICINE

## 2018-04-20 PROCEDURE — 74011250637 HC RX REV CODE- 250/637: Performed by: NURSE PRACTITIONER

## 2018-04-20 PROCEDURE — 74011000258 HC RX REV CODE- 258: Performed by: INTERNAL MEDICINE

## 2018-04-20 PROCEDURE — 65270000029 HC RM PRIVATE

## 2018-04-20 PROCEDURE — 74011250636 HC RX REV CODE- 250/636: Performed by: INTERNAL MEDICINE

## 2018-04-20 PROCEDURE — 74011250637 HC RX REV CODE- 250/637: Performed by: INTERNAL MEDICINE

## 2018-04-20 RX ORDER — CLINDAMYCIN PHOSPHATE 600 MG/50ML
600 INJECTION, SOLUTION INTRAVENOUS EVERY 8 HOURS
Status: DISCONTINUED | OUTPATIENT
Start: 2018-04-20 | End: 2018-04-24

## 2018-04-20 RX ADMIN — Medication 10 ML: at 05:17

## 2018-04-20 RX ADMIN — OXYCODONE HYDROCHLORIDE 20 MG: 5 TABLET ORAL at 16:57

## 2018-04-20 RX ADMIN — HYDROMORPHONE HYDROCHLORIDE 0.5 MG: 2 INJECTION INTRAMUSCULAR; INTRAVENOUS; SUBCUTANEOUS at 02:28

## 2018-04-20 RX ADMIN — OXYCODONE HYDROCHLORIDE 20 MG: 5 TABLET ORAL at 22:00

## 2018-04-20 RX ADMIN — CLINDAMYCIN PHOSPHATE 600 MG: 600 INJECTION, SOLUTION INTRAVENOUS at 18:31

## 2018-04-20 RX ADMIN — HYDROMORPHONE HYDROCHLORIDE 0.5 MG: 2 INJECTION INTRAMUSCULAR; INTRAVENOUS; SUBCUTANEOUS at 11:53

## 2018-04-20 RX ADMIN — HYDROMORPHONE HYDROCHLORIDE 0.5 MG: 2 INJECTION INTRAMUSCULAR; INTRAVENOUS; SUBCUTANEOUS at 07:02

## 2018-04-20 RX ADMIN — MEROPENEM 500 MG: 500 INJECTION, POWDER, FOR SOLUTION INTRAVENOUS at 17:00

## 2018-04-20 RX ADMIN — HYDROMORPHONE HYDROCHLORIDE 0.5 MG: 2 INJECTION INTRAMUSCULAR; INTRAVENOUS; SUBCUTANEOUS at 15:51

## 2018-04-20 RX ADMIN — Medication 10 ML: at 22:00

## 2018-04-20 RX ADMIN — HYDROMORPHONE HYDROCHLORIDE 0.5 MG: 2 INJECTION INTRAMUSCULAR; INTRAVENOUS; SUBCUTANEOUS at 20:27

## 2018-04-20 RX ADMIN — OXYCODONE HYDROCHLORIDE 20 MG: 5 TABLET ORAL at 13:14

## 2018-04-20 RX ADMIN — OXYCODONE HYDROCHLORIDE 20 MG: 5 TABLET ORAL at 08:50

## 2018-04-20 RX ADMIN — MEROPENEM 500 MG: 500 INJECTION, POWDER, FOR SOLUTION INTRAVENOUS at 22:00

## 2018-04-20 RX ADMIN — OXYCODONE HYDROCHLORIDE 20 MG: 5 TABLET ORAL at 01:14

## 2018-04-20 RX ADMIN — SODIUM CHLORIDE 50 ML/HR: 900 INJECTION, SOLUTION INTRAVENOUS at 08:49

## 2018-04-20 RX ADMIN — Medication 10 ML: at 13:15

## 2018-04-20 RX ADMIN — CEFEPIME HYDROCHLORIDE 2 G: 2 INJECTION, POWDER, FOR SOLUTION INTRAVENOUS at 11:52

## 2018-04-20 RX ADMIN — METHADONE HYDROCHLORIDE 110 MG: 10 TABLET ORAL at 08:50

## 2018-04-20 RX ADMIN — OXYCODONE HYDROCHLORIDE 20 MG: 5 TABLET ORAL at 05:16

## 2018-04-20 NOTE — CONSULTS
Surgery Consult    Subjective:      Prince Lomeli is a 39 y.o. male admitted to the hospital 4/15 for LE cellulitis and infected ulcers. States he has had problems with chronic venous stasis for long time and has had wound related to this over the past 6-7 months. States last week he developed foul smell from wounds and increased pain and redness prompting his visit to the ED. Since admission he states swelling has worsened, which he attributes to IVF. He developed KATHARINE felt by nephro to be 2/2 ABX. ID following and wound cultures have grown numerous organisms. Per vascular note, no arterial insufficiency and recommend compressive therapy. We have been asked to eval for possible wound debridement and irrigation under anesthesia as patient is having difficulty tolerating wound care d/t pain.      Past Medical History:   Diagnosis Date    Ill-defined condition     venous stasis ulcers    Obesity (BMI 30-39.9) 4/15/2018     Past Surgical History:   Procedure Laterality Date    HX CORONARY STENT PLACEMENT        Family History   Problem Relation Age of Onset    Family history unknown: Yes     Social History     Social History    Marital status: SINGLE     Spouse name: N/A    Number of children: N/A    Years of education: N/A     Social History Main Topics    Smoking status: Current Every Day Smoker     Packs/day: 1.00    Smokeless tobacco: Never Used    Alcohol use No    Drug use: No    Sexual activity: Not Asked     Other Topics Concern    None     Social History Narrative      Current Facility-Administered Medications   Medication Dose Route Frequency    meropenem (MERREM) 1 g in 0.9% sodium chloride (MBP/ADV) 50 mL  1 g IntraVENous Q8H    clindamycin (CLEOCIN) 600mg NS 50 mL IVPB (premix)  600 mg IntraVENous Q8H    HYDROmorphone (DILAUDID) injection 0.5 mg  0.5 mg IntraVENous Q4H PRN    oxyCODONE IR (ROXICODONE) tablet 20 mg  20 mg Oral Q4H PRN    polyethylene glycol (MIRALAX) packet 17 g  17 g Oral DAILY    senna-docusate (PERICOLACE) 8.6-50 mg per tablet 1 Tab  1 Tab Oral QHS    naloxone (NARCAN) injection 0.4 mg  0.4 mg IntraVENous EVERY 2 MINUTES AS NEEDED    methadone (DOLOPHINE) tablet 110 mg  110 mg Oral DAILY    0.9% sodium chloride infusion  50 mL/hr IntraVENous CONTINUOUS    sodium chloride (NS) flush 5-10 mL  5-10 mL IntraVENous Q8H    sodium chloride (NS) flush 5-10 mL  5-10 mL IntraVENous PRN    acetaminophen (TYLENOL) tablet 650 mg  650 mg Oral Q4H PRN    prochlorperazine (COMPAZINE) injection 10 mg  10 mg IntraVENous Q6H PRN    zolpidem (AMBIEN) tablet 5 mg  5 mg Oral QHS PRN    enoxaparin (LOVENOX) injection 40 mg  40 mg SubCUTAneous Q24H    aluminum-magnesium hydroxide (MAALOX) oral suspension 30 mL  30 mL Oral Q6H PRN    sodium chloride (NS) flush 5-10 mL  5-10 mL IntraVENous PRN      Allergies   Allergen Reactions    Tylenol [Acetaminophen] Other (comments)     abd pain          Review of Systems:REVIEW OF SYSTEMS:     []     Unable to obtain  ROS due to  []    mental status change  []    sedated   []    intubated   []    Total of 12 systems reviewed as follows:    Constitutional: neg for fevers, chills, weight loss, malaise  Eyes: negative for blurry vision  Ears, nose, mouth, throat, and face: negative for sore throat  Respiratory: negative for SOB  Cardiovascular: negative for CP  Gastrointestinal: negative for nausea, vomiting, abdominal pain, diarrhea, constipation, melena, hematochezia  Genitourinary: negative for dysuria  Integument/breast: + venous stasis wound BLE  Hematologic/lymphatic: neg for bruising  Musculoskeletal: negative for muscle aches, + leg swelling and pain   Neurological: no dizziness or h/a    Objective:      Patient Vitals for the past 8 hrs:   BP Temp Pulse Resp SpO2 Weight   04/20/18 1524 175/88 100.1 °F (37.8 °C) 91 18 98 % -   04/20/18 1100 135/82 98.9 °F (37.2 °C) 84 18 98 % -   04/20/18 0800 - - - - - 99.4 kg (219 lb 2.2 oz)       Temp (24hrs), Av.1 °F (37.3 °C), Min:98.2 °F (36.8 °C), Max:100.1 °F (37.8 °C)      Physical Exam:  General:  Alert, cooperative, no distress, appears stated age. Eyes:  Conjunctivae/corneas clear. Nose: Nares normal. Septum midline   Mouth/Throat: Lips, mucosa, and tongue normal.    Neck: Supple, symmetrical, trachea midline   Lungs:   Clear to auscultation bilaterally. Heart:  Regular rate and rhythm   Abdomen:   Soft, non-tender, non-distended, no rebound, no guarding, normal bowel sounds   Extremities: Bilateral lower extremities with dressings in place, edematous R>L. Toes are warm. Pt declines removal of dressing or any further assessment of lower extremities. Skin: Skin color, texture, turgor normal. No rashes or lesions   Neuro: Alert, oriented, speech clear     Labs: Recent Labs      18   0116   WBC  13.2*   HGB  11.4*   HCT  36.2*   PLT  326     Recent Labs      18   0116   NA  134*   K  5.1   CL  99   CO2  25   GLU  133*   BUN  23*   CREA  2.28*   CA  9.0   PHOS  3.8   ALB  2.4*     No results for input(s): INR in the last 72 hours. No lab exists for component: INREXT      Assessment and Plan:     BLE cellulitis  Chronic BLE wound 2/2 venous insufficiency  Opioid dependence     Patient would not allow me to examine wounds today although he said he may be willing later. I discussed wounds with Fely Schwab, PT. Patient's exquisite pain has made sufficient wound care challenging. From her description, he may benefit from debridement under anesthesia. Ultimately he will need consistent outpatient wound care and compressive therapy to manage this chronic problem. NPO after midnight to reserve option for debridement tomorrow but will make final decision once we are able to fully examine lower extremities. Thank you for the consultation. Attending eval to follow.          Signed By: BRYSON Boyle     2018

## 2018-04-20 NOTE — PROGRESS NOTES
[facility] Progress Note           Julio Dillard DO    [phone] Office  [phone]  Fax    2018      Assessment & Plan:   1. Right lower extremity cellulitis in the setting of a nonhealing chronic venous stasis ulcers. Cultures growing diptheroids, providencia, klebsiella, pseudomonas, and group c streptococcus. Continue cefepime while hospitalized. At d/c can deescalate abx to renally dose adjusted cephalexin and cipro. Compression and elevation recommended by vascular surgery. cultures final  2. KATHARINE. Suspect related to abx. Stopped vancomycin and pip-tazo. D/w WCN. Although patien reports improvement there has been progression of infection despite appropriate abx. General surgery consulted for debridement. Broaden abx to meropenem and clindamycin          Subjective:     Feeling better    Objective:     Vitals:   Visit Vitals    /90 (BP 1 Location: Left arm, BP Patient Position: Sitting)    Pulse 92    Temp 98.2 °F (36.8 °C)    Resp 18    Ht 5' 7\" (1.702 m)    Wt 99.4 kg (219 lb 2.2 oz)    SpO2 100%    BMI 34.32 kg/m2        Tmax:  Temp (24hrs), Av.7 °F (37.1 °C), Min:98.2 °F (36.8 °C), Max:99.6 °F (37.6 °C)        Physical Examination:   General:  Alert, cooperative, no distress   Head:  Normocephalic, atraumatic. Eyes:  Conjunctivae clear   Neck: Supple       Lungs:   No distress. Chest wall:     Heart:     Abdomen:      Extremities: LE's dressed. Serous drainage soaking through dressing on right   Skin:     Neurologic: CNII-XII intact. Labs:        No lab exists for component: ITNL   No results for input(s): CPK, CKMB, TROIQ in the last 72 hours.   Recent Labs      18   0116  18   0649  18   0630   NA  134*  135*   --    K  5.1  5.5*   --    CL  99  101   --    CO2  25  23   --    BUN  23*  22*   --    CREA  2.28*  2.38*  2.09*   GLU  133*  99   --    PHOS  3.8   --    --    ALB  2.4*   --    --    WBC  13.2*  13.2* --    HGB  11.4*  11.3*   --    HCT  36.2*  35.4*   --    PLT  326  299   --      No results for input(s): INR, PTP, APTT in the last 72 hours. No lab exists for component: INREXT, INREXT  Needs: urine analysis, urine sodium, protein and creatinine  Lab Results   Component Value Date/Time    Sodium urine, random 50 04/19/2018 01:56 PM    Creatinine, urine 82.29 04/19/2018 01:56 PM         Cultures:     No results found for: SDES  Lab Results   Component Value Date/Time    Culture result: HEAVY DIPHTHEROIDS (A) 04/16/2018 12:16 AM    Culture result: HEAVY PROVIDENCIA RETTGERI (A) 04/16/2018 12:16 AM    Culture result: HEAVY PSEUDOMONAS AERUGINOSA (A) 04/16/2018 12:16 AM    Culture result: LIGHT KLEBSIELLA OXYTOCA (A) 04/16/2018 12:16 AM    Culture result: (A) 04/16/2018 12:16 AM     HEAVY STREPTOCOCCI, BETA HEMOLYTIC GROUP C Penicillin and ampicillin are drugs of choice for treatment of beta-hemolytic streptococcal infections. Susceptibility testing of penicillins and beta-lactams approved by the FDA for treatment of beta-hemolytic streptococcal infections need not be performed routinely, because nonsusceptible isolates are extremely rare.  CLSI 2012       Radiology:     Medications       Current Facility-Administered Medications   Medication Dose Route Frequency Last Dose    HYDROmorphone (DILAUDID) injection 0.5 mg  0.5 mg IntraVENous Q4H PRN 0.5 mg at 04/20/18 0702    cefepime (MAXIPIME) 2 g in 0.9% sodium chloride (MBP/ADV) 100 mL  2 g IntraVENous Q12H 2 g at 04/19/18 2345    oxyCODONE IR (ROXICODONE) tablet 20 mg  20 mg Oral Q4H PRN 20 mg at 04/20/18 0850    polyethylene glycol (MIRALAX) packet 17 g  17 g Oral DAILY 17 g at 04/19/18 0829    senna-docusate (PERICOLACE) 8.6-50 mg per tablet 1 Tab  1 Tab Oral QHS Stopped at 04/18/18 2200    naloxone (NARCAN) injection 0.4 mg  0.4 mg IntraVENous EVERY 2 MINUTES AS NEEDED      methadone (DOLOPHINE) tablet 110 mg  110 mg Oral DAILY 110 mg at 04/20/18 0850  0.9% sodium chloride infusion  50 mL/hr IntraVENous CONTINUOUS 50 mL/hr at 04/20/18 0849    sodium chloride (NS) flush 5-10 mL  5-10 mL IntraVENous Q8H 10 mL at 04/20/18 0517    sodium chloride (NS) flush 5-10 mL  5-10 mL IntraVENous PRN      acetaminophen (TYLENOL) tablet 650 mg  650 mg Oral Q4H  mg at 04/16/18 1957    prochlorperazine (COMPAZINE) injection 10 mg  10 mg IntraVENous Q6H PRN 10 mg at 04/16/18 0636    zolpidem (AMBIEN) tablet 5 mg  5 mg Oral QHS PRN      enoxaparin (LOVENOX) injection 40 mg  40 mg SubCUTAneous Q24H 40 mg at 04/18/18 2006    aluminum-magnesium hydroxide (MAALOX) oral suspension 30 mL  30 mL Oral Q6H PRN 30 mL at 04/16/18 4991    sodium chloride (NS) flush 5-10 mL  5-10 mL IntraVENous PRN             Case discussed with: Pharmacy      Jose Stewart DO

## 2018-04-20 NOTE — PROGRESS NOTES
Bedside and Verbal shift change report given to YARELIS Ordonez (oncoming nurse) by Olayinka Bell RN (offgoing nurse). Report included the following information SBAR, Kardex, ED Summary, Intake/Output, Accordion and Recent Results.

## 2018-04-20 NOTE — WOUND CARE
Wound Consult: Follow Up Visit. Chart reviewed. Consulted for lower leg f/u; patient with heavy growth group C strep and some other organisms but leg does present as strep in nature of extending blistering - now extending down under plantar foot in large area. Spoke with patients nurse and PT, Juno Santiago as using hydrotherapy may have been beneficial if available to help clean up the leg, however, not available so requesting surgical consult to clean up right leg and left under sedation; otherwise, due to pain, we are not able to remove the layers of devitalized tissue - mostly decompressed blisters anteriorly and posteriorly / medially he has the full thickness pre-existing ulcers with heavy slough. Patient up in recliner; elevating legs however, right leg bandage had saturated through within a few hours of placing today. No odor is noted, drainage is serous in nature. Left leg with minimal drainage; no cellulitis in this extremity. Assessment:  Right lower leg - circumferential skin involvement with more partial thickness skin injury across anterior surface of leg from below knee to ankle areas; decompressed blisters with gelatinous drainage; where unroofed has pink/red moist deep partial thickness base noted; the existing posterior/medial wounds are full thickness with heavy yellow/tan slough and drainage. Macerated skin between all areas as well as erythema and warmth; exquisite pain in leg - light touch to even intact skin on foot is not tolerated - he could not tolerate me palpating for DP pulse. Foot is warm, brisk cap refill - erythema extends through toes. Heavy serous drainage. Right plantar foot - extension of infectious process through majority of plantar right foot with blistering beginning and violet discoloration under - exquisitely tender/painful to touch - above forefoot and heel spared at this time.   Left lower leg - posterior medial aspect has large full thickness ulcer ~ 17 x 7 x 0.1 cm, yellow wound bed, then lateral aspect a smaller full thickness ulceration ~ 10 x 4 x 0.1 cm also yellow; has some chronic avis discoloration to gaiter area but no erythema/warmth. Treatment:  Irrigated right leg wounds with ExSept wound cleanser then rinsed with saline; used Acticoat Flex to entire leg, covered with 4x4s, ABD pads and kerlex/tape. Did not think he could tolerate light ace wrap to extremity. Left the Xeroform to left leg and placed dry outer dressing with 4x4s, ABD, kerlex and tape. Wound Recommendations:  Irrigate right leg wounds with ExSept wound cleanser then rinse with sterile water; use sterile water Acticoat Flex to entire leg, cover with 4x4s, ABD pads and kerlex/tape - change Acticoat every three days - outer dressing daily and as needed for drainage. Continue Xeroform to left lower leg. Skin Care Recommendations:  1. Minimize friction/shear: minimize layers of linen/pads under patient. 2. Off load pressure/reposition: patient currently able to move to off load and off load heels - assist if this changes and ensure frequent turning and off loading. 3. Manage Moisture - keep skin folds dry, patient continent and using urinal.  4. Continue to monitor nutrition, pain, and skin risk scale, and skin assessment. Plan:  Kory Mendoza called Dr. Fernando Call to update while in room with patient and myself; will change orders for right leg and we requested surgical evaluation to help clean up these wounds with some anesthesia or sedation as patient cannot tolerate even light touch. We will continue to reassess routinely and as needed.   Gerald Mcconnell, [address] [facility] Office [phone]  Pager [phone]

## 2018-04-20 NOTE — PROGRESS NOTES
[facility] [address]  [address] [facility] [place], [address] [place] [address]  [phone]      Medical Progress Note      NAME: Chanda Vang   :  1975  MRM:  [id number]    Date/Time: 2018  12:22 PM       Assessment and Plan:   1. Cellulitis / Open leg wound / Chronic cutaneous venous stasis ulcer - POA, worsening since he left wound clinic 2 months ago due to financial problems. US negative for DVT. evaluated by Vascular surgery and recommended compression and elevation. Heavy diphtheroids, providencia, heavy strep, beta hemolytic group C and pseudomonas showed on Cx. ID  Evaluation appreciated. On cefepime.       2. Sepsis / Leukocytosis / Sinus tachycardia - (POA). NOT severe sepsis. Continue IV and ABx as above     3. Anemia, mild and stable. Monitor      4. Chronic pain - Continue usual methadone. Palliative consulted for additional leg pain managment     5. Obesity (BMI 30-39.9) - Advise weight loss.      6. Tobacco abuse - Advise cessation. Contributes to poor LE circulation and healing. 7.  KATHARINE. Possible ATN  from vancomycin. Check urine for eosinophil and consult renal. IVF. Monitor renal function    8. Hyperkalemia. Kayexalate.                  Subjective:     Chief Complaint:  Follow up of pt who was admitted with cellulitis. C/o severe leg pain     ROS:  (bold if positive, if negative)      Tolerating PT  Tolerating Diet        Objective:     Last 24hrs VS reviewed since prior progress note.  Most recent are:    Visit Vitals    /90 (BP 1 Location: Left arm, BP Patient Position: Sitting)    Pulse 92    Temp 98.2 °F (36.8 °C)    Resp 18    Ht 5' 7\" (1.702 m)    Wt 102 kg (224 lb 13.9 oz)    SpO2 100%    BMI 35.22 kg/m2     SpO2 Readings from Last 6 Encounters:   18 100%   18 98%   01/15/18 98%   13 93%            Intake/Output Summary (Last 24 hours) at 18 1020  Last data filed at 18 0727   Gross per 24 hour   Intake 0 ml   Output             2625 ml   Net            -2625 ml        Physical Exam:    Gen:  Well-developed, well-nourished, in no acute distress  HEENT:  Pink conjunctivae, PERRL, hearing intact to voice, moist mucous membranes  Neck:  Supple, without masses, thyroid non-tender  Resp:  No accessory muscle use, clear breath sounds without wheezes rales or rhonchi  Card:  No murmurs, normal S1, S2 without thrills, bruits or peripheral edema  Abd:  Soft, non-tender, non-distended, normoactive bowel sounds are present, no palpable organomegaly and no detectable hernias  Lymph:  No cervical or inguinal adenopathy  Musc:  No cyanosis or clubbing  Skin:  Chronic leg ulcer and hyperemia.    Neuro:  Cranial nerves are grossly intact, no focal motor weakness, follows commands appropriately  Psych:  Good insight, oriented to person, place and time, alert  __________________________________________________________________  Medications Reviewed: (see below)  Medications:     Current Facility-Administered Medications   Medication Dose Route Frequency    HYDROmorphone (DILAUDID) injection 0.5 mg  0.5 mg IntraVENous Q4H PRN    cefepime (MAXIPIME) 2 g in 0.9% sodium chloride (MBP/ADV) 100 mL  2 g IntraVENous Q12H    oxyCODONE IR (ROXICODONE) tablet 20 mg  20 mg Oral Q4H PRN    polyethylene glycol (MIRALAX) packet 17 g  17 g Oral DAILY    senna-docusate (PERICOLACE) 8.6-50 mg per tablet 1 Tab  1 Tab Oral QHS    naloxone (NARCAN) injection 0.4 mg  0.4 mg IntraVENous EVERY 2 MINUTES AS NEEDED    methadone (DOLOPHINE) tablet 110 mg  110 mg Oral DAILY    0.9% sodium chloride infusion  50 mL/hr IntraVENous CONTINUOUS    sodium chloride (NS) flush 5-10 mL  5-10 mL IntraVENous Q8H    sodium chloride (NS) flush 5-10 mL  5-10 mL IntraVENous PRN    acetaminophen (TYLENOL) tablet 650 mg  650 mg Oral Q4H PRN    prochlorperazine (COMPAZINE) injection 10 mg  10 mg IntraVENous Q6H PRN    zolpidem (AMBIEN) tablet 5 mg  5 mg Oral QHS PRN    enoxaparin (LOVENOX) injection 40 mg  40 mg SubCUTAneous Q24H    aluminum-magnesium hydroxide (MAALOX) oral suspension 30 mL  30 mL Oral Q6H PRN    sodium chloride (NS) flush 5-10 mL  5-10 mL IntraVENous PRN        Lab Data Reviewed: (see below)  Lab Review:     Recent Labs      04/20/18   0116  04/19/18   0649   WBC  13.2*  13.2*   HGB  11.4*  11.3*   HCT  36.2*  35.4*   PLT  326  299     Recent Labs      04/20/18   0116  04/19/18   0649  04/18/18   0630   NA  134*  135*   --    K  5.1  5.5*   --    CL  99  101   --    CO2  25  23   --    GLU  133*  99   --    BUN  23*  22*   --    CREA  2.28*  2.38*  2.09*   CA  9.0  9.3   --    PHOS  3.8   --    --    ALB  2.4*   --    --      No results found for: GLUCPOC  No results for input(s): PH, PCO2, PO2, HCO3, FIO2 in the last 72 hours. No results for input(s): INR in the last 72 hours. No lab exists for component: Blanca Stair  All Micro Results     Procedure Component Value Units Date/Time    CULTURE, BLOOD [[id number]] Collected:  04/15/18 2129    Order Status:  Completed Specimen:  Blood from Blood Updated:  04/20/18 0714     Special Requests: NO SPECIAL REQUESTS        Culture result: NO GROWTH 4 DAYS       CULTURE, WOUND Yuridia Kasal STAIN [[id number]]  (Abnormal)  (Susceptibility) Collected:  04/16/18 0016    Order Status:  Completed Specimen:  Wound from Wound Drainage Updated:  04/19/18 1026     Special Requests: NO SPECIAL REQUESTS        GRAM STAIN OCCASIONAL WBCS SEEN         2+ GRAM NEGATIVE RODS                 1+ GRAM POSITIVE COCCI (IN PAIRS)              FEW GRAM POSITIVE RODS (CORYNEFORM)     Culture result:         HEAVY PROVIDENCIA RETTGERI (A)              HEAVY PSEUDOMONAS AERUGINOSA (A)              LIGHT KLEBSIELLA OXYTOCA (A)              HEAVY STREPTOCOCCI, BETA HEMOLYTIC GROUP C Penicillin and ampicillin are drugs of choice for treatment of beta-hemolytic streptococcal infections.  Susceptibility testing of penicillins and beta-lactams approved by the FDA for treatment of beta-hemolytic streptococcal infections need not be performed routinely, because nonsusceptible isolates are extremely rare. CLSI 2012 (A)      HEAVY DIPHTHEROIDS (A)             I have reviewed notes of prior 24hr. Other pertinent lab:       Total time spent with patient: [place] 59 discussed with: Patient, Nursing Staff and >50% of time spent in counseling and coordination of care    Discussed:  Care Plan    Prophylaxis:  Lovenox    Disposition:  Home w/Family           ___________________________________________________    Attending Physician: Shorty Giraldo MD

## 2018-04-21 LAB
25(OH)D2 SERPL-MCNC: <1 NG/ML
25(OH)D3 SERPL-MCNC: 15 NG/ML
25(OH)D3+25(OH)D2 SERPL-MCNC: 16 NG/ML
ANION GAP SERPL CALC-SCNC: 9 MMOL/L (ref 5–15)
BUN SERPL-MCNC: 23 MG/DL (ref 6–20)
BUN/CREAT SERPL: 11 (ref 12–20)
CALCIUM SERPL-MCNC: 9.1 MG/DL (ref 8.5–10.1)
CHLORIDE SERPL-SCNC: 100 MMOL/L (ref 97–108)
CO2 SERPL-SCNC: 26 MMOL/L (ref 21–32)
CREAT SERPL-MCNC: 2.17 MG/DL (ref 0.7–1.3)
GLUCOSE SERPL-MCNC: 109 MG/DL (ref 65–100)
POTASSIUM SERPL-SCNC: 5.7 MMOL/L (ref 3.5–5.1)
SODIUM SERPL-SCNC: 135 MMOL/L (ref 136–145)

## 2018-04-21 PROCEDURE — 74011250636 HC RX REV CODE- 250/636: Performed by: INTERNAL MEDICINE

## 2018-04-21 PROCEDURE — 74011250636 HC RX REV CODE- 250/636: Performed by: NURSE PRACTITIONER

## 2018-04-21 PROCEDURE — 80048 BASIC METABOLIC PNL TOTAL CA: CPT | Performed by: INTERNAL MEDICINE

## 2018-04-21 PROCEDURE — 65270000029 HC RM PRIVATE

## 2018-04-21 PROCEDURE — 74011000258 HC RX REV CODE- 258: Performed by: INTERNAL MEDICINE

## 2018-04-21 PROCEDURE — 74011250637 HC RX REV CODE- 250/637: Performed by: INTERNAL MEDICINE

## 2018-04-21 PROCEDURE — 74011250637 HC RX REV CODE- 250/637: Performed by: NURSE PRACTITIONER

## 2018-04-21 PROCEDURE — 36415 COLL VENOUS BLD VENIPUNCTURE: CPT | Performed by: INTERNAL MEDICINE

## 2018-04-21 RX ORDER — SODIUM POLYSTYRENE SULFONATE 15 G/60ML
30 SUSPENSION ORAL; RECTAL
Status: COMPLETED | OUTPATIENT
Start: 2018-04-21 | End: 2018-04-21

## 2018-04-21 RX ADMIN — HYDROMORPHONE HYDROCHLORIDE 0.5 MG: 2 INJECTION INTRAMUSCULAR; INTRAVENOUS; SUBCUTANEOUS at 08:39

## 2018-04-21 RX ADMIN — HYDROMORPHONE HYDROCHLORIDE 0.5 MG: 2 INJECTION INTRAMUSCULAR; INTRAVENOUS; SUBCUTANEOUS at 04:15

## 2018-04-21 RX ADMIN — CLINDAMYCIN PHOSPHATE 600 MG: 600 INJECTION, SOLUTION INTRAVENOUS at 11:50

## 2018-04-21 RX ADMIN — CLINDAMYCIN PHOSPHATE 600 MG: 600 INJECTION, SOLUTION INTRAVENOUS at 18:41

## 2018-04-21 RX ADMIN — OXYCODONE HYDROCHLORIDE 20 MG: 5 TABLET ORAL at 15:28

## 2018-04-21 RX ADMIN — OXYCODONE HYDROCHLORIDE 20 MG: 5 TABLET ORAL at 20:33

## 2018-04-21 RX ADMIN — Medication 10 ML: at 06:19

## 2018-04-21 RX ADMIN — MEROPENEM 500 MG: 500 INJECTION, POWDER, FOR SOLUTION INTRAVENOUS at 22:19

## 2018-04-21 RX ADMIN — Medication 10 ML: at 22:19

## 2018-04-21 RX ADMIN — SODIUM POLYSTYRENE SULFONATE 30 G: 15 SUSPENSION ORAL; RECTAL at 09:53

## 2018-04-21 RX ADMIN — CLINDAMYCIN PHOSPHATE 600 MG: 600 INJECTION, SOLUTION INTRAVENOUS at 00:28

## 2018-04-21 RX ADMIN — HYDROMORPHONE HYDROCHLORIDE 0.5 MG: 2 INJECTION INTRAMUSCULAR; INTRAVENOUS; SUBCUTANEOUS at 00:28

## 2018-04-21 RX ADMIN — SODIUM CHLORIDE 50 ML/HR: 900 INJECTION, SOLUTION INTRAVENOUS at 08:39

## 2018-04-21 RX ADMIN — OXYCODONE HYDROCHLORIDE 20 MG: 5 TABLET ORAL at 06:17

## 2018-04-21 RX ADMIN — MEROPENEM 500 MG: 500 INJECTION, POWDER, FOR SOLUTION INTRAVENOUS at 06:18

## 2018-04-21 RX ADMIN — HYDROMORPHONE HYDROCHLORIDE 0.5 MG: 2 INJECTION INTRAMUSCULAR; INTRAVENOUS; SUBCUTANEOUS at 22:19

## 2018-04-21 RX ADMIN — OXYCODONE HYDROCHLORIDE 20 MG: 5 TABLET ORAL at 11:50

## 2018-04-21 RX ADMIN — HYDROMORPHONE HYDROCHLORIDE 0.5 MG: 2 INJECTION INTRAMUSCULAR; INTRAVENOUS; SUBCUTANEOUS at 18:42

## 2018-04-21 RX ADMIN — OXYCODONE HYDROCHLORIDE 20 MG: 5 TABLET ORAL at 02:04

## 2018-04-21 RX ADMIN — MEROPENEM 500 MG: 500 INJECTION, POWDER, FOR SOLUTION INTRAVENOUS at 14:39

## 2018-04-21 RX ADMIN — METHADONE HYDROCHLORIDE 110 MG: 10 TABLET ORAL at 08:39

## 2018-04-21 RX ADMIN — HYDROMORPHONE HYDROCHLORIDE 0.5 MG: 2 INJECTION INTRAMUSCULAR; INTRAVENOUS; SUBCUTANEOUS at 13:46

## 2018-04-21 RX ADMIN — Medication 10 ML: at 13:50

## 2018-04-21 NOTE — PROGRESS NOTES
Renal Progress Note    NAME:  Daysi White   :   1975   MRN:   [id number]     Date/Time:  2018      Assessment:   1. Acute Kidney Injury --> ? ATN vs ? AIN --> possibly antibiotic Rx (improving)  2. Cellulitis  3. Hyperkalemia - resolved       Plan:   1. The serum creatinine is heading in the right direction. He is no longer of Vanc and Zosyn. 2. Kayexalate (30 grams x one dose) this morning. Repeat lytes this afternoon. 3. Avoid NSAIDs and IV contrast.  4. Hold ACE-Is, ARBs and Renin Antagonists. Subjective:              ---- >Mr. Richard Ramirez complained of pain in the extremities. 18  -->   He inquired about the timing of surgery. Pain seemed to be less intense this morning.           Review of Systems:  Y  N       Y  N  []         []          Fever/chills                                               []         []          Chest Pain  []         []          Cough                                                       []         []          Diarrhea   []         []          Sputum                                                     []         []          Constipation  []         []          SOB/MILLER                                                []         []          Nausea/Vomit  []         []          Abd Pain                                                    []         []          Tolerating PT  []         []          Dysuria                                                      []         []          Tolerating Diet     []        Unable to obtain  ROS due to  []        mental status change  []        sedated   []        intubated    Medications reviewed:  Current Facility-Administered Medications   Medication Dose Route Frequency    sodium polystyrene (KAYEXALATE) 15 gram/60 mL oral suspension 30 g  30 g Oral NOW    clindamycin (CLEOCIN) 600mg NS 50 mL IVPB (premix)  600 mg IntraVENous Q8H    meropenem (MERREM) 500 mg in 0.9% sodium chloride (MBP/ADV) 50 mL MBP  500 mg IntraVENous Q8H    HYDROmorphone (DILAUDID) injection 0.5 mg  0.5 mg IntraVENous Q4H PRN    oxyCODONE IR (ROXICODONE) tablet 20 mg  20 mg Oral Q4H PRN    polyethylene glycol (MIRALAX) packet 17 g  17 g Oral DAILY    senna-docusate (PERICOLACE) 8.6-50 mg per tablet 1 Tab  1 Tab Oral QHS    naloxone (NARCAN) injection 0.4 mg  0.4 mg IntraVENous EVERY 2 MINUTES AS NEEDED    methadone (DOLOPHINE) tablet 110 mg  110 mg Oral DAILY    0.9% sodium chloride infusion  50 mL/hr IntraVENous CONTINUOUS    sodium chloride (NS) flush 5-10 mL  5-10 mL IntraVENous Q8H    sodium chloride (NS) flush 5-10 mL  5-10 mL IntraVENous PRN    acetaminophen (TYLENOL) tablet 650 mg  650 mg Oral Q4H PRN    prochlorperazine (COMPAZINE) injection 10 mg  10 mg IntraVENous Q6H PRN    zolpidem (AMBIEN) tablet 5 mg  5 mg Oral QHS PRN    enoxaparin (LOVENOX) injection 40 mg  40 mg SubCUTAneous Q24H    aluminum-magnesium hydroxide (MAALOX) oral suspension 30 mL  30 mL Oral Q6H PRN    sodium chloride (NS) flush 5-10 mL  5-10 mL IntraVENous PRN        Objective:   Vitals:  Visit Vitals    /77 (BP 1 Location: Left arm, BP Patient Position: At rest)    Pulse 74    Temp 98 °F (36.7 °C)    Resp 18    Ht 5' 7\" (1.702 m)    Wt 99.4 kg (219 lb 2.2 oz)    SpO2 99%    BMI 34.32 kg/m2     Temp (24hrs), Av.7 °F (37.1 °C), Min:98 °F (36.7 °C), Max:100.1 °F (37.8 °C)      O2 Device: Room air    Last 24hr Input/Output:    Intake/Output Summary (Last 24 hours) at 18 0920  Last data filed at 18 0847   Gross per 24 hour   Intake                0 ml   Output             2600 ml   Net            -2600 ml        PHYSICAL EXAM:    Seen in Room 536. General:    He was sitting in the chair. Head:   Normocephalic    Eyes:   No icterus    . Lungs:   Clear to auscultation, No rales. Heart:   No S 3  gallop. Abdomen:    Not distended.      Extremities: Dressings - both legs    Psych:   Not anxious or agitated. Neuro:               Alert and oriented .         []        Telemetry Reviewed     []        NSR []        PAC/PVCs   []        Afib  []        Paced   []        NSVT   []        Mckeon []        NGT  []        Intubated on vent    Lab Data Reviewed:    Recent Results (from the past 24 hour(s))   METABOLIC PANEL, BASIC    Collection Time: 04/21/18  2:06 AM   Result Value Ref Range    Sodium 135 (L) 136 - 145 mmol/L    Potassium 5.7 (H) 3.5 - 5.1 mmol/L    Chloride 100 97 - 108 mmol/L    CO2 26 21 - 32 mmol/L    Anion gap 9 5 - 15 mmol/L    Glucose 109 (H) 65 - 100 mg/dL    BUN 23 (H) 6 - 20 MG/DL    Creatinine 2.17 (H) 0.70 - 1.30 MG/DL    BUN/Creatinine ratio 11 (L) 12 - 20      GFR est AA 40 (L) >60 ml/min/1.73m2    GFR est non-AA 33 (L) >60 ml/min/1.73m2    Calcium 9.1 8.5 - 10.1 MG/DL       Total time spent with patient:  []        15   []        25   []        35   []         __ minutes    []        Critical Care Provided    Care Plan discussed with:      D/W Nursing      [x]        Patient   []        Family    []        Care Manager   []        Consultant/Specialist :      []          >50% of visit spent in counseling and coordination of care   (Discussed []        CODE status,  []        Care Plan, []        D/C Planning)    ___________________________________________________    Attending Physician: Kris Corley MD

## 2018-04-21 NOTE — PROGRESS NOTES
Bedside and Verbal shift change report given to Roetta Jeans, RN (oncoming nurse) by Elsie Altamirano (offgoing nurse). Report included the following information SBAR, Kardex, OR Summary, Intake/Output, MAR, Accordion, Recent Results and Med Rec Status.

## 2018-04-21 NOTE — PROGRESS NOTES
[facility] [address]  [address] [facility], [place], [address] [facility]  [phone]      Medical Progress Note      NAME: Dequan Saldana   :  1975  MRM:  [id number]    Date/Time: 2018  12:22 PM       Assessment and Plan:   1. Cellulitis / Open leg wound / Chronic cutaneous venous stasis ulcer - POA, worsening since he left wound clinic 2 months ago due to financial problems. US negative for DVT. evaluated by Vascular surgery and recommended compression and elevation. Heavy diphtheroids, providencia, heavy strep, beta hemolytic group C and pseudomonas showed on Cx. ID  Evaluation appreciated. Changed ABx to meropenem and clindamycin. Consulted general surgery for possible debridement.        2. Sepsis / Leukocytosis / Sinus tachycardia - (POA). NOT severe sepsis. Continue IV and ABx as above     3. Anemia, mild and stable. Monitor      4. Chronic pain - Continue usual methadone. Palliative consulted for additional leg pain managment     5. Obesity (BMI 30-39.9) - Advise weight loss.      6. Tobacco abuse - Advise cessation. Contributes to poor LE circulation and healing. 7.  KATHARINE. Possible ATN  from vancomycin. Check urine for eosinophil and consult renal. IVF. Monitor renal function    8. Hyperkalemia. Will give veltassa                 Subjective:     Chief Complaint:  Follow up of pt who was admitted with cellulitis. C/o severe leg pain     ROS:  (bold if positive, if negative)      Tolerating PT  Tolerating Diet        Objective:     Last 24hrs VS reviewed since prior progress note.  Most recent are:    Visit Vitals    /77 (BP 1 Location: Left arm, BP Patient Position: At rest)    Pulse 74    Temp 98 °F (36.7 °C)    Resp 18    Ht 5' 7\" (1.702 m)    Wt 82 kg (180 lb 12.4 oz)    SpO2 99%    BMI 28.31 kg/m2     SpO2 Readings from Last 6 Encounters:   18 99%   18 98%   01/15/18 98%   13 93%            Intake/Output Summary (Last 24 hours) at 04/21/18 1209  Last data filed at 04/21/18 0847   Gross per 24 hour   Intake                0 ml   Output             2600 ml   Net            -2600 ml        Physical Exam:    Gen:  Well-developed, well-nourished, in no acute distress  HEENT:  Pink conjunctivae, PERRL, hearing intact to voice, moist mucous membranes  Neck:  Supple, without masses, thyroid non-tender  Resp:  No accessory muscle use, clear breath sounds without wheezes rales or rhonchi  Card:  No murmurs, normal S1, S2 without thrills, bruits or peripheral edema  Abd:  Soft, non-tender, non-distended, normoactive bowel sounds are present, no palpable organomegaly and no detectable hernias  Lymph:  No cervical or inguinal adenopathy  Musc:  No cyanosis or clubbing  Skin:  Chronic leg ulcer and hyperemia.    Neuro:  Cranial nerves are grossly intact, no focal motor weakness, follows commands appropriately  Psych:  Good insight, oriented to person, place and time, alert  __________________________________________________________________  Medications Reviewed: (see below)  Medications:     Current Facility-Administered Medications   Medication Dose Route Frequency    patiromer calcium sorbitex (VELTASSA) powder 8.4 g  8.4 g Oral NOW    clindamycin (CLEOCIN) 600mg NS 50 mL IVPB (premix)  600 mg IntraVENous Q8H    meropenem (MERREM) 500 mg in 0.9% sodium chloride (MBP/ADV) 50 mL MBP  500 mg IntraVENous Q8H    HYDROmorphone (DILAUDID) injection 0.5 mg  0.5 mg IntraVENous Q4H PRN    oxyCODONE IR (ROXICODONE) tablet 20 mg  20 mg Oral Q4H PRN    polyethylene glycol (MIRALAX) packet 17 g  17 g Oral DAILY    senna-docusate (PERICOLACE) 8.6-50 mg per tablet 1 Tab  1 Tab Oral QHS    naloxone (NARCAN) injection 0.4 mg  0.4 mg IntraVENous EVERY 2 MINUTES AS NEEDED    methadone (DOLOPHINE) tablet 110 mg  110 mg Oral DAILY    0.9% sodium chloride infusion  50 mL/hr IntraVENous CONTINUOUS    sodium chloride (NS) flush 5-10 mL  5-10 mL IntraVENous Q8H    sodium chloride (NS) flush 5-10 mL  5-10 mL IntraVENous PRN    acetaminophen (TYLENOL) tablet 650 mg  650 mg Oral Q4H PRN    prochlorperazine (COMPAZINE) injection 10 mg  10 mg IntraVENous Q6H PRN    zolpidem (AMBIEN) tablet 5 mg  5 mg Oral QHS PRN    enoxaparin (LOVENOX) injection 40 mg  40 mg SubCUTAneous Q24H    aluminum-magnesium hydroxide (MAALOX) oral suspension 30 mL  30 mL Oral Q6H PRN    sodium chloride (NS) flush 5-10 mL  5-10 mL IntraVENous PRN        Lab Data Reviewed: (see below)  Lab Review:     Recent Labs      04/20/18   0116  04/19/18   0649   WBC  13.2*  13.2*   HGB  11.4*  11.3*   HCT  36.2*  35.4*   PLT  326  299     Recent Labs      04/21/18   0206  04/20/18   0116  04/19/18   0649   NA  135*  134*  135*   K  5.7*  5.1  5.5*   CL  100  99  101   CO2  26  25  23   GLU  109*  133*  99   BUN  23*  23*  22*   CREA  2.17*  2.28*  2.38*   CA  9.1  9.0  9.3   PHOS   --   3.8   --    ALB   --   2.4*   --      No results found for: GLUCPOC  No results for input(s): PH, PCO2, PO2, HCO3, FIO2 in the last 72 hours. No results for input(s): INR in the last 72 hours.     No lab exists for component: Red Watson  All Micro Results     Procedure Component Value Units Date/Time    CULTURE, BLOOD [[id number]] Collected:  04/15/18 2129    Order Status:  Completed Specimen:  Blood from Blood Updated:  04/21/18 0712     Special Requests: NO SPECIAL REQUESTS        Culture result: NO GROWTH 5 DAYS       CULTURE, WOUND Cynthia Mura STAIN [[id number]]  (Abnormal)  (Susceptibility) Collected:  04/16/18 0016    Order Status:  Completed Specimen:  Wound from Wound Drainage Updated:  04/19/18 1026     Special Requests: NO SPECIAL REQUESTS        GRAM STAIN OCCASIONAL WBCS SEEN         2+ GRAM NEGATIVE RODS                 1+ GRAM POSITIVE COCCI (IN PAIRS)              FEW GRAM POSITIVE RODS (CORYNEFORM)     Culture result:         HEAVY PROVIDENCIA RETTGERI (A)              HEAVY PSEUDOMONAS AERUGINOSA (A) LIGHT KLEBSIELLA OXYTOCA (A)              HEAVY STREPTOCOCCI, BETA HEMOLYTIC GROUP C Penicillin and ampicillin are drugs of choice for treatment of beta-hemolytic streptococcal infections. Susceptibility testing of penicillins and beta-lactams approved by the FDA for treatment of beta-hemolytic streptococcal infections need not be performed routinely, because nonsusceptible isolates are extremely rare. CLSI 2012 (A)      HEAVY DIPHTHEROIDS (A)             I have reviewed notes of prior 24hr. Other pertinent lab:       Total time spent with patient: Harris 59 discussed with: Patient, Nursing Staff and >50% of time spent in counseling and coordination of care    Discussed:  Care Plan    Prophylaxis:  Lovenox    Disposition:  Home w/Family           ___________________________________________________    Attending Physician: Lj Peacock MD

## 2018-04-21 NOTE — PROGRESS NOTES
I will take Mr. Newman to the OR for judicious lower extremity debridement in the next few days. I suspect he will need lifelong compression therapy, at the minimum. He has significant circumferential dermis destruction and edema from the chronic stasis. A lot of the skin has sloughed and necrosed, which has led to his current state. He is complaining that his right heel \"feels weird\" and is largely insensate. I have placed a consult for podiatry to evaluate the foot as well prior to any debridement planning. Once debrided, will need aggressive compression and elevation, including unna boot.        Gilberto Ramirez MD

## 2018-04-21 NOTE — PROGRESS NOTES
Bedside and Verbal shift change report given to YARELIS Ordonez (oncoming nurse) by Abhijit Fall RN (offgoing nurse). Report included the following information SBAR, Kardex, ED Summary, Intake/Output, Accordion and Recent Results.

## 2018-04-21 NOTE — PROGRESS NOTES
[facility] Progress Note           Brent Mckeon DO    [phone] Office  [phone]  Fax    2018      Assessment & Plan:   1. Right lower extremity cellulitis in the setting of a nonhealing chronic venous stasis ulcers. Cultures growing diptheroids, providencia, klebsiella, pseudomonas, and group c streptococcus. Abx broadened to meropenem/clinda due to worsening [place] [facility] General surgery planning debridement under anesthesia. Compression and elevation recommended by vascular surgery. 2. KATHARINE. Suspect related to abx. Stopped vancomycin and pip-tazo. Subjective:     Feeling better    Objective:     Vitals:   Visit Vitals    /77 (BP 1 Location: Left arm, BP Patient Position: At rest)    Pulse 74    Temp 98 °F (36.7 °C)    Resp 18    Ht 5' 7\" (1.702 m)    Wt 82 kg (180 lb 12.4 oz)    SpO2 99%    BMI 28.31 kg/m2        Tmax:  Temp (24hrs), Av.7 °F (37.1 °C), Min:98 °F (36.7 °C), Max:100.1 °F (37.8 °C)        Physical Examination:   General:  Alert, cooperative, no distress   Head:  Normocephalic, atraumatic. Eyes:  Conjunctivae clear   Neck: Supple       Lungs:   No distress. Chest wall:     Heart:     Abdomen:      Extremities: LE's dressed. Serous drainage soaking through dressing on right   Skin:     Neurologic: CNII-XII intact. Labs:        No lab exists for component: ITNL   No results for input(s): CPK, CKMB, TROIQ in the last 72 hours. Recent Labs      18   0206  18   0116  18   0649   NA  135*  134*  135*   K  5.7*  5.1  5.5*   CL  100  99  101   CO2  26  25  23   BUN  23*  23*  22*   CREA  2.17*  2.28*  2.38*   GLU  109*  133*  99   PHOS   --   3.8   --    ALB   --   2.4*   --    WBC   --   13.2*  13.2*   HGB   --   11.4*  11.3*   HCT   --   36.2*  35.4*   PLT   --   326  299     No results for input(s): INR, PTP, APTT in the last 72 hours.     No lab exists for component: INREXT, INREXT  Needs: urine analysis, urine sodium, protein and creatinine  Lab Results   Component Value Date/Time    Sodium urine, random 50 04/19/2018 01:56 PM    Creatinine, urine 82.29 04/19/2018 01:56 PM         Cultures:     No results found for: SDES  Lab Results   Component Value Date/Time    Culture result: HEAVY DIPHTHEROIDS (A) 04/16/2018 12:16 AM    Culture result: HEAVY PROVIDENCIA RETTGERI (A) 04/16/2018 12:16 AM    Culture result: HEAVY PSEUDOMONAS AERUGINOSA (A) 04/16/2018 12:16 AM    Culture result: LIGHT KLEBSIELLA OXYTOCA (A) 04/16/2018 12:16 AM    Culture result: (A) 04/16/2018 12:16 AM     HEAVY STREPTOCOCCI, BETA HEMOLYTIC GROUP C Penicillin and ampicillin are drugs of choice for treatment of beta-hemolytic streptococcal infections. Susceptibility testing of penicillins and beta-lactams approved by the FDA for treatment of beta-hemolytic streptococcal infections need not be performed routinely, because nonsusceptible isolates are extremely rare.  CLSI 2012       Radiology:     Medications       Current Facility-Administered Medications   Medication Dose Route Frequency Last Dose    patiromer calcium sorbitex (VELTASSA) powder 8.4 g  8.4 g Oral NOW      clindamycin (CLEOCIN) 600mg NS 50 mL IVPB (premix)  600 mg IntraVENous Q8H 600 mg at 04/21/18 1150    meropenem (MERREM) 500 mg in 0.9% sodium chloride (MBP/ADV) 50 mL MBP  500 mg IntraVENous Q8H 500 mg at 04/21/18 0618    HYDROmorphone (DILAUDID) injection 0.5 mg  0.5 mg IntraVENous Q4H PRN 0.5 mg at 04/21/18 0839    oxyCODONE IR (ROXICODONE) tablet 20 mg  20 mg Oral Q4H PRN 20 mg at 04/21/18 1150    polyethylene glycol (MIRALAX) packet 17 g  17 g Oral DAILY 17 g at 04/19/18 0829    senna-docusate (PERICOLACE) 8.6-50 mg per tablet 1 Tab  1 Tab Oral QHS Stopped at 04/18/18 2200    naloxone (NARCAN) injection 0.4 mg  0.4 mg IntraVENous EVERY 2 MINUTES AS NEEDED      methadone (DOLOPHINE) tablet 110 mg  110 mg Oral DAILY 110 mg at 04/21/18 0839    0.9% sodium chloride infusion  50 mL/hr IntraVENous CONTINUOUS 50 mL/hr at 04/21/18 0839    sodium chloride (NS) flush 5-10 mL  5-10 mL IntraVENous Q8H 10 mL at 04/21/18 3436    sodium chloride (NS) flush 5-10 mL  5-10 mL IntraVENous PRN      acetaminophen (TYLENOL) tablet 650 mg  650 mg Oral Q4H  mg at 04/16/18 1957    prochlorperazine (COMPAZINE) injection 10 mg  10 mg IntraVENous Q6H PRN 10 mg at 04/16/18 0636    zolpidem (AMBIEN) tablet 5 mg  5 mg Oral QHS PRN      enoxaparin (LOVENOX) injection 40 mg  40 mg SubCUTAneous Q24H 40 mg at 04/18/18 2006    aluminum-magnesium hydroxide (MAALOX) oral suspension 30 mL  30 mL Oral Q6H PRN 30 mL at 04/16/18 0629    sodium chloride (NS) flush 5-10 mL  5-10 mL IntraVENous PRN             Case discussed with: Dr Carolyne Rivera DO

## 2018-04-22 ENCOUNTER — APPOINTMENT (OUTPATIENT)
Dept: GENERAL RADIOLOGY | Age: 43
DRG: 853 | End: 2018-04-22
Attending: PODIATRIST
Payer: SELF-PAY

## 2018-04-22 LAB
ALBUMIN SERPL-MCNC: 2.2 G/DL (ref 3.5–5)
ANION GAP SERPL CALC-SCNC: 6 MMOL/L (ref 5–15)
BACTERIA SPEC CULT: NORMAL
BASOPHILS # BLD: 0 K/UL (ref 0–0.1)
BASOPHILS NFR BLD: 0 % (ref 0–1)
BUN SERPL-MCNC: 22 MG/DL (ref 6–20)
BUN/CREAT SERPL: 11 (ref 12–20)
CALCIUM SERPL-MCNC: 8.6 MG/DL (ref 8.5–10.1)
CHLORIDE SERPL-SCNC: 99 MMOL/L (ref 97–108)
CO2 SERPL-SCNC: 30 MMOL/L (ref 21–32)
CREAT SERPL-MCNC: 2.03 MG/DL (ref 0.7–1.3)
DIFFERENTIAL METHOD BLD: ABNORMAL
EOSINOPHIL # BLD: 0.4 K/UL (ref 0–0.4)
EOSINOPHIL NFR BLD: 3 % (ref 0–7)
ERYTHROCYTE [DISTWIDTH] IN BLOOD BY AUTOMATED COUNT: 14.9 % (ref 11.5–14.5)
GLUCOSE SERPL-MCNC: 110 MG/DL (ref 65–100)
HCT VFR BLD AUTO: 35.3 % (ref 36.6–50.3)
HGB BLD-MCNC: 11.1 G/DL (ref 12.1–17)
IMM GRANULOCYTES # BLD: 0 K/UL
IMM GRANULOCYTES NFR BLD AUTO: 0 %
LYMPHOCYTES # BLD: 2.2 K/UL (ref 0.8–3.5)
LYMPHOCYTES NFR BLD: 16 % (ref 12–49)
MCH RBC QN AUTO: 26.2 PG (ref 26–34)
MCHC RBC AUTO-ENTMCNC: 31.4 G/DL (ref 30–36.5)
MCV RBC AUTO: 83.5 FL (ref 80–99)
METAMYELOCYTES NFR BLD MANUAL: 4 %
MONOCYTES # BLD: 1.1 K/UL (ref 0–1)
MONOCYTES NFR BLD: 8 % (ref 5–13)
MYELOCYTES NFR BLD MANUAL: 13 %
NEUTS BAND NFR BLD MANUAL: 3 % (ref 0–6)
NEUTS SEG # BLD: 7.8 K/UL (ref 1.8–8)
NEUTS SEG NFR BLD: 53 % (ref 32–75)
NRBC # BLD: 0 K/UL (ref 0–0.01)
NRBC BLD-RTO: 0 PER 100 WBC
PHOSPHATE SERPL-MCNC: 4.1 MG/DL (ref 2.6–4.7)
PLATELET # BLD AUTO: 459 K/UL (ref 150–400)
PLATELET COMMENTS,PCOM: ABNORMAL
PMV BLD AUTO: 8.5 FL (ref 8.9–12.9)
POTASSIUM SERPL-SCNC: 4.5 MMOL/L (ref 3.5–5.1)
RBC # BLD AUTO: 4.23 M/UL (ref 4.1–5.7)
RBC MORPH BLD: ABNORMAL
SERVICE CMNT-IMP: NORMAL
SODIUM SERPL-SCNC: 135 MMOL/L (ref 136–145)
WBC # BLD AUTO: 13.9 K/UL (ref 4.1–11.1)

## 2018-04-22 PROCEDURE — 36415 COLL VENOUS BLD VENIPUNCTURE: CPT | Performed by: INTERNAL MEDICINE

## 2018-04-22 PROCEDURE — 80069 RENAL FUNCTION PANEL: CPT | Performed by: INTERNAL MEDICINE

## 2018-04-22 PROCEDURE — 74011250636 HC RX REV CODE- 250/636: Performed by: INTERNAL MEDICINE

## 2018-04-22 PROCEDURE — 74011250637 HC RX REV CODE- 250/637: Performed by: NURSE PRACTITIONER

## 2018-04-22 PROCEDURE — 65270000029 HC RM PRIVATE

## 2018-04-22 PROCEDURE — 85025 COMPLETE CBC W/AUTO DIFF WBC: CPT | Performed by: INTERNAL MEDICINE

## 2018-04-22 PROCEDURE — 74011000258 HC RX REV CODE- 258: Performed by: INTERNAL MEDICINE

## 2018-04-22 PROCEDURE — 74011250637 HC RX REV CODE- 250/637: Performed by: INTERNAL MEDICINE

## 2018-04-22 PROCEDURE — 73630 X-RAY EXAM OF FOOT: CPT

## 2018-04-22 PROCEDURE — 74011250636 HC RX REV CODE- 250/636: Performed by: NURSE PRACTITIONER

## 2018-04-22 RX ADMIN — HYDROMORPHONE HYDROCHLORIDE 0.5 MG: 2 INJECTION INTRAMUSCULAR; INTRAVENOUS; SUBCUTANEOUS at 06:17

## 2018-04-22 RX ADMIN — SODIUM CHLORIDE 50 ML/HR: 900 INJECTION, SOLUTION INTRAVENOUS at 15:37

## 2018-04-22 RX ADMIN — Medication 10 ML: at 14:38

## 2018-04-22 RX ADMIN — HYDROMORPHONE HYDROCHLORIDE 0.5 MG: 2 INJECTION INTRAMUSCULAR; INTRAVENOUS; SUBCUTANEOUS at 18:54

## 2018-04-22 RX ADMIN — OXYCODONE HYDROCHLORIDE 20 MG: 5 TABLET ORAL at 04:39

## 2018-04-22 RX ADMIN — CLINDAMYCIN PHOSPHATE 600 MG: 600 INJECTION, SOLUTION INTRAVENOUS at 00:35

## 2018-04-22 RX ADMIN — OXYCODONE HYDROCHLORIDE 20 MG: 5 TABLET ORAL at 16:54

## 2018-04-22 RX ADMIN — OXYCODONE HYDROCHLORIDE 20 MG: 5 TABLET ORAL at 08:33

## 2018-04-22 RX ADMIN — Medication 10 ML: at 06:17

## 2018-04-22 RX ADMIN — HYDROMORPHONE HYDROCHLORIDE 0.5 MG: 2 INJECTION INTRAMUSCULAR; INTRAVENOUS; SUBCUTANEOUS at 10:30

## 2018-04-22 RX ADMIN — OXYCODONE HYDROCHLORIDE 20 MG: 5 TABLET ORAL at 00:37

## 2018-04-22 RX ADMIN — METHADONE HYDROCHLORIDE 110 MG: 10 TABLET ORAL at 08:33

## 2018-04-22 RX ADMIN — CLINDAMYCIN PHOSPHATE 600 MG: 600 INJECTION, SOLUTION INTRAVENOUS at 08:33

## 2018-04-22 RX ADMIN — OXYCODONE HYDROCHLORIDE 20 MG: 5 TABLET ORAL at 20:40

## 2018-04-22 RX ADMIN — HYDROMORPHONE HYDROCHLORIDE 0.5 MG: 2 INJECTION INTRAMUSCULAR; INTRAVENOUS; SUBCUTANEOUS at 14:38

## 2018-04-22 RX ADMIN — HYDROMORPHONE HYDROCHLORIDE 0.5 MG: 2 INJECTION INTRAMUSCULAR; INTRAVENOUS; SUBCUTANEOUS at 02:22

## 2018-04-22 RX ADMIN — MEROPENEM 500 MG: 500 INJECTION, POWDER, FOR SOLUTION INTRAVENOUS at 14:45

## 2018-04-22 RX ADMIN — MEROPENEM 500 MG: 500 INJECTION, POWDER, FOR SOLUTION INTRAVENOUS at 21:07

## 2018-04-22 RX ADMIN — MEROPENEM 500 MG: 500 INJECTION, POWDER, FOR SOLUTION INTRAVENOUS at 06:12

## 2018-04-22 RX ADMIN — HYDROMORPHONE HYDROCHLORIDE 0.5 MG: 2 INJECTION INTRAMUSCULAR; INTRAVENOUS; SUBCUTANEOUS at 22:40

## 2018-04-22 RX ADMIN — OXYCODONE HYDROCHLORIDE 20 MG: 5 TABLET ORAL at 12:37

## 2018-04-22 RX ADMIN — CLINDAMYCIN PHOSPHATE 600 MG: 600 INJECTION, SOLUTION INTRAVENOUS at 16:54

## 2018-04-22 RX ADMIN — DOCUSATE SODIUM AND SENNOSIDES 1 TABLET: 8.6; 5 TABLET, FILM COATED ORAL at 20:40

## 2018-04-22 NOTE — CONSULTS
Annie Miles, [facility] - Adi Moreira, [address] [facility], [facility]                                                 Podiatric Surgery Consultation  Assessment/Plan:  Mr. Alan Davis is a 38M who presents with right leg venous stasis ulcerations, cellulitis and right foot pain    - Pnt refused dressings change today but said that I could see the right foot/leg tomorrow  - Will order a right foot xray  - On clinda/jose  - Will order edilson/pvr to guide in compression therapy  - Continue to elevate   - Surgery planning for debridement  - Thank you for this consultation. Please do not hesitate to call with any questions. - Will follow      Subjective:  Pnt seen at bedside. Reports some pain in his right leg. Denies f/cn/v. He says that the swelling in his legs has gone down. HPI:   Mr. Alan Davis is a 38M who presents with a right leg venous stasis ulcerations, cellulitis. He has has a long history of chronic venous ulcers. Recently they became infected and he presented to the hospital. He also states that he had a blister on the bottom of his foot which he popped himself.        History:  Cellulitis  Allergies   Allergen Reactions    Tylenol [Acetaminophen] Other (comments)     abd pain        Family History   Problem Relation Age of Onset    Family history unknown: Yes      Past Medical History:   Diagnosis Date    Ill-defined condition     venous stasis ulcers    Obesity (BMI 30-39.9) 4/15/2018     Past Surgical History:   Procedure Laterality Date    HX CORONARY STENT PLACEMENT       Social History   Substance Use Topics    Smoking status: Current Every Day Smoker     Packs/day: 1.00    Smokeless tobacco: Never Used    Alcohol use No       History   Alcohol Use No     History   Drug Use No      History   Smoking Status    Current Every Day Smoker    Packs/day: 1.00   Smokeless Tobacco    Never Used     Current Facility-Administered Medications   Medication Dose Route Frequency    clindamycin (CLEOCIN) 600mg NS 50 mL IVPB (premix)  600 mg IntraVENous Q8H    meropenem (MERREM) 500 mg in 0.9% sodium chloride (MBP/ADV) 50 mL MBP  500 mg IntraVENous Q8H    HYDROmorphone (DILAUDID) injection 0.5 mg  0.5 mg IntraVENous Q4H PRN    oxyCODONE IR (ROXICODONE) tablet 20 mg  20 mg Oral Q4H PRN    polyethylene glycol (MIRALAX) packet 17 g  17 g Oral DAILY    senna-docusate (PERICOLACE) 8.6-50 mg per tablet 1 Tab  1 Tab Oral QHS    naloxone (NARCAN) injection 0.4 mg  0.4 mg IntraVENous EVERY 2 MINUTES AS NEEDED    methadone (DOLOPHINE) tablet 110 mg  110 mg Oral DAILY    0.9% sodium chloride infusion  50 mL/hr IntraVENous CONTINUOUS    sodium chloride (NS) flush 5-10 mL  5-10 mL IntraVENous Q8H    sodium chloride (NS) flush 5-10 mL  5-10 mL IntraVENous PRN    acetaminophen (TYLENOL) tablet 650 mg  650 mg Oral Q4H PRN    prochlorperazine (COMPAZINE) injection 10 mg  10 mg IntraVENous Q6H PRN    zolpidem (AMBIEN) tablet 5 mg  5 mg Oral QHS PRN    enoxaparin (LOVENOX) injection 40 mg  40 mg SubCUTAneous Q24H    aluminum-magnesium hydroxide (MAALOX) oral suspension 30 mL  30 mL Oral Q6H PRN    sodium chloride (NS) flush 5-10 mL  5-10 mL IntraVENous PRN        Objective:  Vitals: Patient Vitals for the past 12 hrs:   BP Temp Pulse Resp SpO2 Weight   04/22/18 1045 - - - - - 98.6 kg (217 lb 6 oz)   04/22/18 0926 135/80 98.6 °F (37 °C) 79 17 97 % -   04/22/18 0351 127/74 98.6 °F (37 °C) 85 18 97 % -   04/22/18 0001 121/77 98.8 °F (37.1 °C) 87 18 96 % -       Refused exam today, but stated I could examine him tomorrow. Constitutional: Pt is a 39 overweight  male. Imaging / Cx / Px:  4/16/18 bilateral le negative for DVT.        Labs:  Recent Labs      04/22/18   0038   WBC  13.9*   CREA  2.03*   BUN  22*   GLU  110*   HGB  11.1*   HCT  35.3*   NA  135*   K  4.5   CL  99   CO2  30

## 2018-04-22 NOTE — PROGRESS NOTES
Bedside and Verbal shift change report given to Brian Botello RN (oncoming nurse) by Amber Calderon RN (offgoing nurse). Report included the following information SBAR, Kardex, Procedure Summary, Intake/Output, MAR, Accordion, Recent Results and Med Rec Status.

## 2018-04-22 NOTE — PROGRESS NOTES
Bedside shift change report given to Cristine Santiago RN (oncoming nurse) by Mckenzie Hillman RN (offgoing nurse). Report included the following information SBAR, Kardex, Intake/Output and Recent Results. Discussed possible debridement with pt, will remain NPO at midnight just in case. Provided pt with kerlix and xeroform for pt to do his own dressing changes. 0300: Pt states he did not do his dressing change r/t minimal drainage. Will reassess in AM.    0320: Pt c/o R wrist PIV hurting. Placed new PIV in L AC, removed R wrist PIV.    6067: Pt states he'd like to take his kayexelate after eating something for breakfast. Need to confirm whether or not pt is NPO this morning for possible debridement.

## 2018-04-22 NOTE — PROGRESS NOTES
Bedside and Verbal shift change report given to YARELIS Ordonez (oncoming nurse) by Abhijit Fall RN (offgoing nurse). Report included the following information SBAR, Kardex, Intake/Output, Accordion and Recent Results.

## 2018-04-22 NOTE — PROGRESS NOTES
Bedside and Verbal shift change report given to YARELIS Osborn (oncoming nurse) by Félix Cox (offgoing nurse). Report included the following information SBAR, Kardex, Procedure Summary, Intake/Output, MAR, Accordion, Recent Results and Med Rec Status.

## 2018-04-22 NOTE — PROGRESS NOTES
General Surgery. Pain controlled on current regimen. No nausea. Visit Vitals    /77 (BP 1 Location: Right arm, BP Patient Position: At rest;Sitting)    Pulse 82    Temp 98.8 °F (37.1 °C)    Resp 17    Ht 5' 7\" (1.702 m)    Wt 98.6 kg (217 lb 6 oz)    SpO2 99%    BMI 34.05 kg/m2       RLE calf wound with dressing in place, 2 second capillary refill in toes, no paresthesia    43M with RLE chronic venous stasis ulcer.     Debridement per Dr. Nicole Feliciano  Continue antibiotics per primary team    Severo Florentino MD

## 2018-04-22 NOTE — PROGRESS NOTES
Addison Girard [facility] [address]  [address] [facility] [place], [place], [address] [place] [address]  [phone]      Medical Progress Note      NAME: Yulisa Pollack   :  1975  MRM:  [id number]    Date/Time: 2018  12:22 PM       Assessment and Plan:   1. Cellulitis / Open leg wound / Chronic cutaneous venous stasis ulcer - POA, worsening since he left wound clinic 2 months ago due to financial problems. US negative for DVT. evaluated by Vascular surgery and recommended compression and elevation. Heavy diphtheroids, providencia, heavy strep, beta hemolytic group C and pseudomonas showed on Cx. ID  Evaluation appreciated. Changed ABx to meropenem and clindamycin for now. Consulted general surgery for possible debridement, which will be done in the next few days. Also, evaluated by podiatry.        2. Sepsis / Leukocytosis / Sinus tachycardia - (POA). NOT severe sepsis. better     3. Anemia, mild and stable. Monitor      4. Chronic pain - Continue usual methadone. Palliative consulted for additional leg pain managment     5. Obesity (BMI 30-39.9) - Advise weight loss.      6. Tobacco abuse - Advise cessation. Contributes to poor LE circulation and healing. 7.  KATHARINE. Possible ATN  from vancomycin. Renal evaluation appreciated. Monitor renal function    8. Hyperkalemia. Resolved.                  Subjective:     Chief Complaint:  Follow up of pt who was admitted with cellulitis. C/o severe leg pain     ROS:  (bold if positive, if negative)      Tolerating PT  Tolerating Diet        Objective:     Last 24hrs VS reviewed since prior progress note.  Most recent are:    Visit Vitals    /80 (BP 1 Location: Left arm, BP Patient Position: At rest;Sitting)    Pulse 79    Temp 98.6 °F (37 °C)    Resp 17    Ht 5' 7\" (1.702 m)    Wt 98.6 kg (217 lb 6 oz)    SpO2 97%    BMI 34.05 kg/m2     SpO2 Readings from Last 6 Encounters:   18 97%   18 98%   01/15/18 98%   13 93% Intake/Output Summary (Last 24 hours) at 04/22/18 1106  Last data filed at 04/22/18 1051   Gross per 24 hour   Intake                0 ml   Output              525 ml   Net             -525 ml        Physical Exam:    Gen:  Well-developed, well-nourished, in no acute distress  HEENT:  Pink conjunctivae, PERRL, hearing intact to voice, moist mucous membranes  Neck:  Supple, without masses, thyroid non-tender  Resp:  No accessory muscle use, clear breath sounds without wheezes rales or rhonchi  Card:  No murmurs, normal S1, S2 without thrills, bruits or peripheral edema  Abd:  Soft, non-tender, non-distended, normoactive bowel sounds are present, no palpable organomegaly and no detectable hernias  Lymph:  No cervical or inguinal adenopathy  Musc:  No cyanosis or clubbing  Skin:  Chronic leg ulcer and hyperemia.    Neuro:  Cranial nerves are grossly intact, no focal motor weakness, follows commands appropriately  Psych:  Good insight, oriented to person, place and time, alert  __________________________________________________________________  Medications Reviewed: (see below)  Medications:     Current Facility-Administered Medications   Medication Dose Route Frequency    clindamycin (CLEOCIN) 600mg NS 50 mL IVPB (premix)  600 mg IntraVENous Q8H    meropenem (MERREM) 500 mg in 0.9% sodium chloride (MBP/ADV) 50 mL MBP  500 mg IntraVENous Q8H    HYDROmorphone (DILAUDID) injection 0.5 mg  0.5 mg IntraVENous Q4H PRN    oxyCODONE IR (ROXICODONE) tablet 20 mg  20 mg Oral Q4H PRN    polyethylene glycol (MIRALAX) packet 17 g  17 g Oral DAILY    senna-docusate (PERICOLACE) 8.6-50 mg per tablet 1 Tab  1 Tab Oral QHS    naloxone (NARCAN) injection 0.4 mg  0.4 mg IntraVENous EVERY 2 MINUTES AS NEEDED    methadone (DOLOPHINE) tablet 110 mg  110 mg Oral DAILY    0.9% sodium chloride infusion  50 mL/hr IntraVENous CONTINUOUS    sodium chloride (NS) flush 5-10 mL  5-10 mL IntraVENous Q8H    sodium chloride (NS) flush 5-10 mL  5-10 mL IntraVENous PRN    acetaminophen (TYLENOL) tablet 650 mg  650 mg Oral Q4H PRN    prochlorperazine (COMPAZINE) injection 10 mg  10 mg IntraVENous Q6H PRN    zolpidem (AMBIEN) tablet 5 mg  5 mg Oral QHS PRN    enoxaparin (LOVENOX) injection 40 mg  40 mg SubCUTAneous Q24H    aluminum-magnesium hydroxide (MAALOX) oral suspension 30 mL  30 mL Oral Q6H PRN    sodium chloride (NS) flush 5-10 mL  5-10 mL IntraVENous PRN        Lab Data Reviewed: (see below)  Lab Review:     Recent Labs      04/22/18 0038  04/20/18   0116   WBC  13.9*  13.2*   HGB  11.1*  11.4*   HCT  35.3*  36.2*   PLT  459*  326     Recent Labs      04/22/18 0038 04/21/18   0206  04/20/18   0116   NA  135*  135*  134*   K  4.5  5.7*  5.1   CL  99  100  99   CO2  30  26  25   GLU  110*  109*  133*   BUN  22*  23*  23*   CREA  2.03*  2.17*  2.28*   CA  8.6  9.1  9.0   PHOS  4.1   --   3.8   ALB  2.2*   --   2.4*     No results found for: GLUCPOC  No results for input(s): PH, PCO2, PO2, HCO3, FIO2 in the last 72 hours. No results for input(s): INR in the last 72 hours.     No lab exists for component: Bryna Canavan  All Micro Results     Procedure Component Value Units Date/Time    CULTURE, BLOOD [[id number]] Collected:  04/15/18 2129    Order Status:  Completed Specimen:  Blood from Blood Updated:  04/22/18 0765     Special Requests: NO SPECIAL REQUESTS        Culture result: NO GROWTH 6 DAYS       [facility] [[id number]]  (Abnormal)  (Susceptibility) Collected:  04/16/18 0016    Order Status:  Completed Specimen:  Wound from Wound Drainage Updated:  04/19/18 1026     Special Requests: NO SPECIAL REQUESTS        GRAM STAIN OCCASIONAL WBCS SEEN         2+ GRAM NEGATIVE RODS                 1+ GRAM POSITIVE COCCI (IN PAIRS)              FEW GRAM POSITIVE RODS (CORYNEFORM)     Culture result:         HEAVY PROVIDENCIA RETTGERI (A)              HEAVY PSEUDOMONAS AERUGINOSA (A)              LIGHT KLEBSIELLA OXYTOCA (A) HEAVY STREPTOCOCCI, BETA HEMOLYTIC GROUP C Penicillin and ampicillin are drugs of choice for treatment of beta-hemolytic streptococcal infections. Susceptibility testing of penicillins and beta-lactams approved by the FDA for treatment of beta-hemolytic streptococcal infections need not be performed routinely, because nonsusceptible isolates are extremely rare. CLSI 2012 (A)      HEAVY DIPHTHEROIDS (A)             I have reviewed notes of prior 24hr. Other pertinent lab:       Total time spent with patient: [place] discussed with: Patient, Nursing Staff and >50% of time spent in counseling and coordination of care    Discussed:  Care Plan    Prophylaxis:  Lovenox    Disposition:  Home w/Family           ___________________________________________________    Attending Physician: Simone Hughes MD

## 2018-04-23 LAB
ANION GAP SERPL CALC-SCNC: 5 MMOL/L (ref 5–15)
BASOPHILS # BLD: 0.1 K/UL (ref 0–0.1)
BASOPHILS NFR BLD: 1 % (ref 0–1)
BUN SERPL-MCNC: 21 MG/DL (ref 6–20)
BUN/CREAT SERPL: 11 (ref 12–20)
CALCIUM SERPL-MCNC: 9 MG/DL (ref 8.5–10.1)
CHLORIDE SERPL-SCNC: 100 MMOL/L (ref 97–108)
CO2 SERPL-SCNC: 31 MMOL/L (ref 21–32)
CREAT SERPL-MCNC: 1.92 MG/DL (ref 0.7–1.3)
DIFFERENTIAL METHOD BLD: ABNORMAL
EOSINOPHIL # BLD: 0.1 K/UL (ref 0–0.4)
EOSINOPHIL NFR BLD: 1 % (ref 0–7)
ERYTHROCYTE [DISTWIDTH] IN BLOOD BY AUTOMATED COUNT: 14.8 % (ref 11.5–14.5)
GLUCOSE SERPL-MCNC: 108 MG/DL (ref 65–100)
HCT VFR BLD AUTO: 35.6 % (ref 36.6–50.3)
HGB BLD-MCNC: 11.1 G/DL (ref 12.1–17)
IMM GRANULOCYTES # BLD: 0 K/UL
IMM GRANULOCYTES NFR BLD AUTO: 0 %
LYMPHOCYTES # BLD: 2.5 K/UL (ref 0.8–3.5)
LYMPHOCYTES NFR BLD: 19 % (ref 12–49)
MCH RBC QN AUTO: 26.1 PG (ref 26–34)
MCHC RBC AUTO-ENTMCNC: 31.2 G/DL (ref 30–36.5)
MCV RBC AUTO: 83.8 FL (ref 80–99)
METAMYELOCYTES NFR BLD MANUAL: 3 %
MONOCYTES # BLD: 0.9 K/UL (ref 0–1)
MONOCYTES NFR BLD: 7 % (ref 5–13)
MYELOCYTES NFR BLD MANUAL: 1 %
NEUTS BAND NFR BLD MANUAL: 6 % (ref 0–6)
NEUTS SEG # BLD: 9 K/UL (ref 1.8–8)
NEUTS SEG NFR BLD: 62 % (ref 32–75)
NRBC # BLD: 0 K/UL (ref 0–0.01)
NRBC BLD-RTO: 0 PER 100 WBC
PLATELET # BLD AUTO: 504 K/UL (ref 150–400)
PLATELET COMMENTS,PCOM: ABNORMAL
PMV BLD AUTO: 8.5 FL (ref 8.9–12.9)
POTASSIUM SERPL-SCNC: 5.7 MMOL/L (ref 3.5–5.1)
RBC # BLD AUTO: 4.25 M/UL (ref 4.1–5.7)
RBC MORPH BLD: ABNORMAL
SODIUM SERPL-SCNC: 136 MMOL/L (ref 136–145)
WBC # BLD AUTO: 13.3 K/UL (ref 4.1–11.1)

## 2018-04-23 PROCEDURE — 74011250637 HC RX REV CODE- 250/637: Performed by: INTERNAL MEDICINE

## 2018-04-23 PROCEDURE — 80048 BASIC METABOLIC PNL TOTAL CA: CPT | Performed by: INTERNAL MEDICINE

## 2018-04-23 PROCEDURE — 93922 UPR/L XTREMITY ART 2 LEVELS: CPT

## 2018-04-23 PROCEDURE — 36415 COLL VENOUS BLD VENIPUNCTURE: CPT | Performed by: INTERNAL MEDICINE

## 2018-04-23 PROCEDURE — 74011250636 HC RX REV CODE- 250/636: Performed by: INTERNAL MEDICINE

## 2018-04-23 PROCEDURE — 74011250636 HC RX REV CODE- 250/636: Performed by: NURSE PRACTITIONER

## 2018-04-23 PROCEDURE — 74011250637 HC RX REV CODE- 250/637: Performed by: NURSE PRACTITIONER

## 2018-04-23 PROCEDURE — 74011000258 HC RX REV CODE- 258: Performed by: INTERNAL MEDICINE

## 2018-04-23 PROCEDURE — 65270000029 HC RM PRIVATE

## 2018-04-23 PROCEDURE — 85025 COMPLETE CBC W/AUTO DIFF WBC: CPT | Performed by: INTERNAL MEDICINE

## 2018-04-23 RX ORDER — OXYCODONE HYDROCHLORIDE 5 MG/1
25 TABLET ORAL
Status: DISCONTINUED | OUTPATIENT
Start: 2018-04-23 | End: 2018-04-26

## 2018-04-23 RX ORDER — SODIUM POLYSTYRENE SULFONATE 15 G/60ML
30 SUSPENSION ORAL; RECTAL
Status: COMPLETED | OUTPATIENT
Start: 2018-04-23 | End: 2018-04-23

## 2018-04-23 RX ORDER — HYDROMORPHONE HYDROCHLORIDE 2 MG/ML
0.5 INJECTION, SOLUTION INTRAMUSCULAR; INTRAVENOUS; SUBCUTANEOUS
Status: DISCONTINUED | OUTPATIENT
Start: 2018-04-23 | End: 2018-04-24

## 2018-04-23 RX ADMIN — CLINDAMYCIN PHOSPHATE 600 MG: 600 INJECTION, SOLUTION INTRAVENOUS at 00:44

## 2018-04-23 RX ADMIN — CLINDAMYCIN PHOSPHATE 600 MG: 600 INJECTION, SOLUTION INTRAVENOUS at 09:04

## 2018-04-23 RX ADMIN — MEROPENEM 500 MG: 500 INJECTION, POWDER, FOR SOLUTION INTRAVENOUS at 14:59

## 2018-04-23 RX ADMIN — OXYCODONE HYDROCHLORIDE 25 MG: 5 TABLET ORAL at 21:44

## 2018-04-23 RX ADMIN — HYDROMORPHONE HYDROCHLORIDE 0.5 MG: 2 INJECTION INTRAMUSCULAR; INTRAVENOUS; SUBCUTANEOUS at 06:52

## 2018-04-23 RX ADMIN — MEROPENEM 500 MG: 500 INJECTION, POWDER, FOR SOLUTION INTRAVENOUS at 14:52

## 2018-04-23 RX ADMIN — OXYCODONE HYDROCHLORIDE 20 MG: 5 TABLET ORAL at 00:44

## 2018-04-23 RX ADMIN — HYDROMORPHONE HYDROCHLORIDE 0.5 MG: 2 INJECTION INTRAMUSCULAR; INTRAVENOUS; SUBCUTANEOUS at 11:22

## 2018-04-23 RX ADMIN — MEROPENEM 500 MG: 500 INJECTION, POWDER, FOR SOLUTION INTRAVENOUS at 05:02

## 2018-04-23 RX ADMIN — OXYCODONE HYDROCHLORIDE 25 MG: 5 TABLET ORAL at 13:19

## 2018-04-23 RX ADMIN — HYDROMORPHONE HYDROCHLORIDE 0.5 MG: 2 INJECTION INTRAMUSCULAR; INTRAVENOUS; SUBCUTANEOUS at 02:50

## 2018-04-23 RX ADMIN — SODIUM POLYSTYRENE SULFONATE 30 G: 15 SUSPENSION ORAL; RECTAL at 06:52

## 2018-04-23 RX ADMIN — OXYCODONE HYDROCHLORIDE 20 MG: 5 TABLET ORAL at 09:04

## 2018-04-23 RX ADMIN — OXYCODONE HYDROCHLORIDE 25 MG: 5 TABLET ORAL at 17:37

## 2018-04-23 RX ADMIN — CLINDAMYCIN PHOSPHATE 600 MG: 600 INJECTION, SOLUTION INTRAVENOUS at 17:37

## 2018-04-23 RX ADMIN — Medication 10 ML: at 20:46

## 2018-04-23 RX ADMIN — MEROPENEM 500 MG: 500 INJECTION, POWDER, FOR SOLUTION INTRAVENOUS at 20:45

## 2018-04-23 RX ADMIN — Medication 10 ML: at 14:53

## 2018-04-23 RX ADMIN — HYDROMORPHONE HYDROCHLORIDE 0.5 MG: 2 INJECTION INTRAMUSCULAR; INTRAVENOUS; SUBCUTANEOUS at 18:37

## 2018-04-23 RX ADMIN — SODIUM CHLORIDE 50 ML/HR: 900 INJECTION, SOLUTION INTRAVENOUS at 14:57

## 2018-04-23 RX ADMIN — OXYCODONE HYDROCHLORIDE 20 MG: 5 TABLET ORAL at 05:02

## 2018-04-23 RX ADMIN — METHADONE HYDROCHLORIDE 110 MG: 10 TABLET ORAL at 09:04

## 2018-04-23 NOTE — PROGRESS NOTES
Renal Progress Note    NAME:  Trudy Ward   :   1975   MRN:   [id number]     Date/Time:  2018      Assessment:   1. Acute Kidney Injury --> ? ATN vs ? AIN --> possibly antibiotic Rx (improving)  2. Cellulitis  3. Hyperkalemia - mild        Plan:   1. The serum creatinine is heading in the right direction. He is no longer of Vanc and Zosyn. 2. Kayexalate was ordered Repeat lytes this afternoon. 3. Avoid NSAIDs and IV contrast.  4. Hold ACE-Is, ARBs and Renin Antagonists. He may benefit from [facility] in the future. This may assist with maintenance of normokalemia  In the future. Subjective:              ---- >Mr. Vikas Quezada complained of pain in the extremities. 18  -->   He inquired about the timing of surgery. Pain seemed to be less intense this morning. 18  ----> Pain is tolerable.                              Review of Systems:  Y  N       Y  N  []         []          Fever/chills                                               []         []          Chest Pain  []         []          Cough                                                       []         []          Diarrhea   []         []          Sputum                                                     []         []          Constipation  []         [x]          SOB/MILLER                                                []         [x]          Nausea/Vomit  []         []          Abd Pain                                                    []         []          Tolerating PT  []         []          Dysuria                                                      []         []          Tolerating Diet     []        Unable to obtain  ROS due to  []        mental status change  []        sedated   []        intubated    Medications reviewed:  Current Facility-Administered Medications   Medication Dose Route Frequency    clindamycin (CLEOCIN) 600mg NS 50 mL IVPB (premix)  600 mg IntraVENous Q8H    meropenem (MERREM) 500 mg in 0.9% sodium chloride (MBP/ADV) 50 mL MBP  500 mg IntraVENous Q8H    HYDROmorphone (DILAUDID) injection 0.5 mg  0.5 mg IntraVENous Q4H PRN    oxyCODONE IR (ROXICODONE) tablet 20 mg  20 mg Oral Q4H PRN    polyethylene glycol (MIRALAX) packet 17 g  17 g Oral DAILY    senna-docusate (PERICOLACE) 8.6-50 mg per tablet 1 Tab  1 Tab Oral QHS    naloxone (NARCAN) injection 0.4 mg  0.4 mg IntraVENous EVERY 2 MINUTES AS NEEDED    methadone (DOLOPHINE) tablet 110 mg  110 mg Oral DAILY    0.9% sodium chloride infusion  50 mL/hr IntraVENous CONTINUOUS    sodium chloride (NS) flush 5-10 mL  5-10 mL IntraVENous Q8H    sodium chloride (NS) flush 5-10 mL  5-10 mL IntraVENous PRN    acetaminophen (TYLENOL) tablet 650 mg  650 mg Oral Q4H PRN    prochlorperazine (COMPAZINE) injection 10 mg  10 mg IntraVENous Q6H PRN    zolpidem (AMBIEN) tablet 5 mg  5 mg Oral QHS PRN    enoxaparin (LOVENOX) injection 40 mg  40 mg SubCUTAneous Q24H    aluminum-magnesium hydroxide (MAALOX) oral suspension 30 mL  30 mL Oral Q6H PRN    sodium chloride (NS) flush 5-10 mL  5-10 mL IntraVENous PRN        Objective:   Vitals:  Visit Vitals    /75 (BP 1 Location: Left arm, BP Patient Position: At rest)    Pulse 81    Temp 99.4 °F (37.4 °C)    Resp 18    Ht 5' 7\" (1.702 m)    Wt 98.6 kg (217 lb 6 oz)    SpO2 95%    BMI 34.05 kg/m2     Temp (24hrs), Av.9 °F (37.2 °C), Min:98.3 °F (36.8 °C), Max:99.4 °F (37.4 °C)      O2 Device: Room air    Last 24hr Input/Output:    Intake/Output Summary (Last 24 hours) at 18 1018  Last data filed at 18 0656   Gross per 24 hour   Intake         23398.66 ml   Output             2425 ml   Net          8881.66 ml        PHYSICAL EXAM:    Seen in Room 536. General:    He was sitting in the chair. Head:   Normocephalic    Eyes:   No icterus    . Lungs:   Clear to auscultation, No rales. Heart:   No S 3  gallop.     Abdomen:    Not distended. Extremities: Dressings - both legs    Psych:   Not anxious or agitated. Neuro:               Alert and oriented . []        Telemetry Reviewed     []        NSR []        PAC/PVCs   []        Afib  []        Paced   []        NSVT   []        Mckeon []        NGT  []        Intubated on vent    Lab Data Reviewed:    Recent Results (from the past 24 hour(s))   CBC WITH AUTOMATED DIFF    Collection Time: 04/23/18  3:16 AM   Result Value Ref Range    WBC 13.3 (H) 4.1 - 11.1 K/uL    RBC 4.25 4. 10 - 5.70 M/uL    HGB 11.1 (L) 12.1 - 17.0 g/dL    HCT 35.6 (L) 36.6 - 50.3 %    MCV 83.8 80.0 - 99.0 FL    MCH 26.1 26.0 - 34.0 PG    MCHC 31.2 30.0 - 36.5 g/dL    RDW 14.8 (H) 11.5 - 14.5 %    PLATELET 453 (H) 940 - 400 K/uL    MPV 8.5 (L) 8.9 - 12.9 FL    NRBC 0.0 0  WBC    ABSOLUTE NRBC 0.00 0.00 - 0.01 K/uL    NEUTROPHILS 62 32 - 75 %    BAND NEUTROPHILS 6 0 - 6 %    LYMPHOCYTES 19 12 - 49 %    MONOCYTES 7 5 - 13 %    EOSINOPHILS 1 0 - 7 %    BASOPHILS 1 0 - 1 %    METAMYELOCYTES 3 (H) 0 %    MYELOCYTES 1 (H) 0 %    IMMATURE GRANULOCYTES 0 %    ABS. NEUTROPHILS 9.0 (H) 1.8 - 8.0 K/UL    ABS. LYMPHOCYTES 2.5 0.8 - 3.5 K/UL    ABS. MONOCYTES 0.9 0.0 - 1.0 K/UL    ABS. EOSINOPHILS 0.1 0.0 - 0.4 K/UL    ABS. BASOPHILS 0.1 0.0 - 0.1 K/UL    ABS. IMM.  GRANS. 0.0 K/UL    DF MANUAL      PLATELET COMMENTS LARGE PLATELETS      RBC COMMENTS NORMOCYTIC, NORMOCHROMIC     METABOLIC PANEL, BASIC    Collection Time: 04/23/18  3:16 AM   Result Value Ref Range    Sodium 136 136 - 145 mmol/L    Potassium 5.7 (H) 3.5 - 5.1 mmol/L    Chloride 100 97 - 108 mmol/L    CO2 31 21 - 32 mmol/L    Anion gap 5 5 - 15 mmol/L    Glucose 108 (H) 65 - 100 mg/dL    BUN 21 (H) 6 - 20 MG/DL    Creatinine 1.92 (H) 0.70 - 1.30 MG/DL    BUN/Creatinine ratio 11 (L) 12 - 20      GFR est AA 46 (L) >60 ml/min/1.73m2    GFR est non-AA 38 (L) >60 ml/min/1.73m2    Calcium 9.0 8.5 - 10.1 MG/DL       Total time spent with patient:  [] 15   []        25   []        35   []         __ minutes    []        Critical Care Provided    Care Plan discussed with:      D/W Dr. Rafael Ulloa      [x]        Patient   []        Family    []        Care Manager   []        Consultant/Specialist :      []          >50% of visit spent in counseling and coordination of care   (Discussed []        CODE status,  []        Care Plan, []        D/C Planning)    ___________________________________________________    Attending Physician: Urmila Ureña MD

## 2018-04-23 NOTE — PROGRESS NOTES
Rishi Woodward, [facility] - [facility] Kristan Hanna, [address] [facility], [facility]                                                 Podiatric Surgery Consultation  Assessment/Plan:  Mr. Geroldine Rinne is a 89D who presents with right and left leg venous stasis ulcerations, right leg cellulitis and right foot pain    - 4/22/18 right foot xray- reviewed, no evidence of om, gas, fracture  - On clinda/jose, ID following  - f/u edilson/pvr to guide in compression therapy  - Continue to elevate   - Surgery planning for debridement  - Thank you for this consultation. Please do not hesitate to call with any questions. - Will follow      Subjective:  Pnt seen at bedside. Allowed to look at his wounds today. Denies f/cn/v. He says that the swelling in his legs has gone down. Denies f/c/n/v, chest pain, sob, dyspnea      HPI:   Mr. Geroldine Rinne is a 43M who presents with a right leg venous stasis ulcerations, cellulitis. He has has a long history of chronic venous ulcers. Recently they became infected and he presented to the hospital. He also states that he had a blister on the bottom of his foot which he popped himself.        History:  Cellulitis  Allergies   Allergen Reactions    Tylenol [Acetaminophen] Other (comments)     abd pain        Family History   Problem Relation Age of Onset    Family history unknown: Yes      Past Medical History:   Diagnosis Date    Ill-defined condition     venous stasis ulcers    Obesity (BMI 30-39.9) 4/15/2018     Past Surgical History:   Procedure Laterality Date    HX CORONARY STENT PLACEMENT       Social History   Substance Use Topics    Smoking status: Current Every Day Smoker     Packs/day: 1.00    Smokeless tobacco: Never Used    Alcohol use No       History   Alcohol Use No     History   Drug Use No      History   Smoking Status    Current Every Day Smoker    Packs/day: 1.00   Smokeless Tobacco    Never Used     Current Facility-Administered Medications Medication Dose Route Frequency    clindamycin (CLEOCIN) 600mg NS 50 mL IVPB (premix)  600 mg IntraVENous Q8H    meropenem (MERREM) 500 mg in 0.9% sodium chloride (MBP/ADV) 50 mL MBP  500 mg IntraVENous Q8H    HYDROmorphone (DILAUDID) injection 0.5 mg  0.5 mg IntraVENous Q4H PRN    oxyCODONE IR (ROXICODONE) tablet 20 mg  20 mg Oral Q4H PRN    polyethylene glycol (MIRALAX) packet 17 g  17 g Oral DAILY    senna-docusate (PERICOLACE) 8.6-50 mg per tablet 1 Tab  1 Tab Oral QHS    naloxone (NARCAN) injection 0.4 mg  0.4 mg IntraVENous EVERY 2 MINUTES AS NEEDED    methadone (DOLOPHINE) tablet 110 mg  110 mg Oral DAILY    0.9% sodium chloride infusion  50 mL/hr IntraVENous CONTINUOUS    sodium chloride (NS) flush 5-10 mL  5-10 mL IntraVENous Q8H    sodium chloride (NS) flush 5-10 mL  5-10 mL IntraVENous PRN    acetaminophen (TYLENOL) tablet 650 mg  650 mg Oral Q4H PRN    prochlorperazine (COMPAZINE) injection 10 mg  10 mg IntraVENous Q6H PRN    zolpidem (AMBIEN) tablet 5 mg  5 mg Oral QHS PRN    enoxaparin (LOVENOX) injection 40 mg  40 mg SubCUTAneous Q24H    aluminum-magnesium hydroxide (MAALOX) oral suspension 30 mL  30 mL Oral Q6H PRN    sodium chloride (NS) flush 5-10 mL  5-10 mL IntraVENous PRN        Objective:  Vitals:   Patient Vitals for the past 12 hrs:   BP Temp Pulse Resp SpO2   04/23/18 0723 124/75 99.4 °F (37.4 °C) 81 18 95 %   04/23/18 0303 118/76 98.9 °F (37.2 °C) 80 18 99 %     Objective  Vitals: See IHeal    Vascular:  Right DP 0/4; PT 0/4  Left DP 0/4; PT 0/4  Capillary fill time <2 seconds  Edema pitting bilateral legs and feet  Skin temperature is normal temp  Varicosities are absent  Bilateral legs with evidence of hemosiderin deposits    Dermatological:  Nails are thickened, elongated, discolored, painful to palpation, 3mm thick, with subungual debris.    Skin is dry and scaly   No evidence of tinea pedis  No hyperkeratotic lesions     Right leg multiple venous stasis ulceration  Location: Right leg  Etiology: venous stasis  Margins: irregular  Drainage: copious serous  Odor: mild  Wound base: slough/fibrin  Depth: pnt refused palpation secondary to pain. Appeared to go to sc fat  Probes to bone? pnt refused palpation secondary to pain  Undermining? Unable to determine  Sinus tracts? Unable to determine  Fluctuance/Subcutaneous crepitus? Unable to determine  Ascending cellulitis/Lymphangitic streaking? Yes periwound cellulitis of the leg    Left leg multiple venous stasis ulceration  Location: left leg  Etiology: venous stasis  Margins: irregular  Drainage: copious serous  Odor: mild  Wound base: slough/fibrin  Depth: pnt refused palpation secondary to pain. Appeared to go to sc fat  Probes to bone? pnt refused palpation secondary to pain  Undermining? Unable to determine  Sinus tracts? Unable to determine  Fluctuance/Subcutaneous crepitus? Unable to determine  Ascending cellulitis/Lymphangitic streaking? None    Neurological:  Protective sensation per 5.07 Barry Raheem monofilament is Rt 10/10 and Lt 10/10  Vibratory sensation at the Rt 1st MPJ present/ LT 1st MPJ present  Epicritic sensation is intact. Patient is AAOx3, mood is normal.       Orthopedic:  B/L LE are symmetric  ROM of ankle, STJ, 1st MTPJ is wnl bilaterally  Pnt refused mmt, secondary to pain  No pedal amputations noted. Lymphatics: negative tenderness to palpation of neck/axillary/inguinal nodes. Constitutional: Pt is a 39 overweight  male. Imaging / Cx / Px:  4/16/18 bilateral le negative for DVT.    4/22/18 rt ft xrays-negative for gas, om, fracture      Labs:  Recent Labs      04/23/18   0316   WBC  13.3*   CREA  1.92*   BUN  21*   GLU  108*   HGB  11.1*   HCT  35.6*   NA  136   K  5.7*   CL  100   CO2  31

## 2018-04-23 NOTE — DISCHARGE SUMMARY
Physician Interim Summary   Patient ID:  Annette West  [id number]  42 y.o.  1975    PCP: None     Consults: ID, Nephrology, General Surgery and podiatry    Covering dates: 4/15/2018 through 4/23/2018    Admission Diagnoses: Cellulitis    Hospital Course:    Cellulitis / Open leg wound / Chronic cutaneous venous stasis ulcer - Heavy diphtheroids, providencia, heavy strep, beta hemolytic group C and pseudomonas showed on Cx. On meropenem and clindamycin currently per ID. General surgery planning for debridement.         **Taken to OR on 4/24 for debridement by general surgery. Second debridement and Unnaboot placed on 4/26. Remains on meropenem. Pain continues to be difficult to manage and is being followed by palliative care. Additional hospital course and discharge summary will be done by discharging physician.

## 2018-04-23 NOTE — PROGRESS NOTES
Bedside shift change report given to Basia Wasserman RN (oncoming nurse) by Chance Dunlap RN (offgoing nurse). Report included the following information SBAR, Kardex, MAR, Accordion and Recent Results.

## 2018-04-23 NOTE — PROGRESS NOTES
[facility]ious Disease Specialists Progress Note           Eddie Gutierrez DO    [phone] Office  [phone]  Fax    2018      Assessment & Plan:   1. Right lower extremity cellulitis in the setting of a nonhealing chronic venous stasis ulcers. Cultures growing diptheroids, providencia, klebsiella, pseudomonas, and group c streptococcus. Abx broadened to meropenem/clinda due to worsening infection per [facility]. General surgery planning debridement under anesthesia. Compression and elevation recommended by vascular surgery. 2. KATHARINE. Improving. Suspect related to abx. Stopped vancomycin and pip-tazo. Subjective:     Feeling better    Objective:     Vitals:   Visit Vitals    /72 (BP 1 Location: Left arm, BP Patient Position: At rest)    Pulse 92    Temp 98 °F (36.7 °C)    Resp 18    Ht 5' 7\" (1.702 m)    Wt 98.6 kg (217 lb 6 oz)    SpO2 99%    BMI 34.05 kg/m2        Tmax:  Temp (24hrs), Av.9 °F (37.2 °C), Min:98 °F (36.7 °C), Max:99.4 °F (37.4 °C)        Physical Examination:   General:  Alert, cooperative, no distress   Head:  Normocephalic, atraumatic. Eyes:  Conjunctivae clear   Neck: Supple       Lungs:   No distress. Chest wall:     Heart:     Abdomen:      Extremities: Legs examined with podiatry. Increased of blistering right foot   Skin:     Neurologic: CNII-XII intact. Labs:        No lab exists for component: ITNL   No results for input(s): CPK, CKMB, TROIQ in the last 72 hours. Recent Labs      18   0316  18   0038  18   0206   NA  136  135*  135*   K  5.7*  4.5  5.7*   CL  100  99  100   CO2  31  30  26   BUN  21*  22*  23*   CREA  1.92*  2.03*  2.17*   GLU  108*  110*  109*   PHOS   --   4.1   --    ALB   --   2.2*   --    WBC  13.3*  13.9*   --    HGB  11.1*  11.1*   --    HCT  35.6*  35.3*   --    PLT  504*  459*   --      No results for input(s): INR, PTP, APTT in the last 72 hours.     No lab exists for component: INREXT, INREXT  Needs: urine analysis, urine sodium, protein and creatinine  Lab Results   Component Value Date/Time    Sodium urine, random 50 04/19/2018 01:56 PM    Creatinine, urine 82.29 04/19/2018 01:56 PM         Cultures:     No results found for: SDES  Lab Results   Component Value Date/Time    Culture result: HEAVY DIPHTHEROIDS (A) 04/16/2018 12:16 AM    Culture result: HEAVY PROVIDENCIA RETTGERI (A) 04/16/2018 12:16 AM    Culture result: HEAVY PSEUDOMONAS AERUGINOSA (A) 04/16/2018 12:16 AM    Culture result: LIGHT KLEBSIELLA OXYTOCA (A) 04/16/2018 12:16 AM    Culture result: (A) 04/16/2018 12:16 AM     HEAVY STREPTOCOCCI, BETA HEMOLYTIC GROUP C Penicillin and ampicillin are drugs of choice for treatment of beta-hemolytic streptococcal infections. Susceptibility testing of penicillins and beta-lactams approved by the FDA for treatment of beta-hemolytic streptococcal infections need not be performed routinely, because nonsusceptible isolates are extremely rare.  CLSI 2012       Radiology:     Medications       Current Facility-Administered Medications   Medication Dose Route Frequency Last Dose    oxyCODONE IR (ROXICODONE) tablet 25 mg  25 mg Oral Q4H PRN      HYDROmorphone (DILAUDID) injection 0.5 mg  0.5 mg IntraVENous Q12H PRN      clindamycin (CLEOCIN) 600mg NS 50 mL IVPB (premix)  600 mg IntraVENous Q8H 600 mg at 04/23/18 0904    meropenem (MERREM) 500 mg in 0.9% sodium chloride (MBP/ADV) 50 mL MBP  500 mg IntraVENous Q8H 500 mg at 04/23/18 0502    polyethylene glycol (MIRALAX) packet 17 g  17 g Oral DAILY Stopped at 04/23/18 0900    senna-docusate (PERICOLACE) 8.6-50 mg per tablet 1 Tab  1 Tab Oral QHS 1 Tab at 04/22/18 2040    naloxone (NARCAN) injection 0.4 mg  0.4 mg IntraVENous EVERY 2 MINUTES AS NEEDED      methadone (DOLOPHINE) tablet 110 mg  110 mg Oral DAILY 110 mg at 04/23/18 0904    0.9% sodium chloride infusion  50 mL/hr IntraVENous CONTINUOUS 50 mL/hr at 04/22/18 1537    sodium chloride (NS) flush 5-10 mL  5-10 mL IntraVENous Q8H 10 mL at 04/22/18 1438    sodium chloride (NS) flush 5-10 mL  5-10 mL IntraVENous PRN      acetaminophen (TYLENOL) tablet 650 mg  650 mg Oral Q4H  mg at 04/16/18 1957    prochlorperazine (COMPAZINE) injection 10 mg  10 mg IntraVENous Q6H PRN 10 mg at 04/16/18 0636    zolpidem (AMBIEN) tablet 5 mg  5 mg Oral QHS PRN      enoxaparin (LOVENOX) injection 40 mg  40 mg SubCUTAneous Q24H 40 mg at 04/18/18 2006    aluminum-magnesium hydroxide (MAALOX) oral suspension 30 mL  30 mL Oral Q6H PRN 30 mL at 04/16/18 0629    sodium chloride (NS) flush 5-10 mL  5-10 mL IntraVENous PRN             Case discussed with: Dr Shorty Martines, DO

## 2018-04-23 NOTE — PROGRESS NOTES
[facility] Dosing Services:     Pharmacist Renal Dosing Progress Note for meropenem   Physician Dr. Zeina Mcclure    The following medication: meropenem was automatically dose-adjusted per [facility] P&T Committee Protocol, with respect to renal function. Consult provided for this   39 y.o. , male , for the indication of SSTI. Previous Regimen Merrem 500 mg IV q 8 hours   Serum Creatinine Lab Results   Component Value Date/Time    Creatinine 1.92 (H) 04/23/2018 03:16 AM       Creatinine Clearance Estimated Creatinine Clearance: 54.9 mL/min (based on Cr of 1.92). BUN Lab Results   Component Value Date/Time    BUN 21 (H) 04/23/2018 03:16 AM       Dosage changed to:  merrem 500 mg IV q 6 hours for crcl > 50 ml/min     Pharmacy to continue to monitor patient daily. Will make dosage adjustments based upon changing renal function.   Signed Patricia Kaufman information:  [phone]

## 2018-04-23 NOTE — CONSULTS
Palliative Medicine Consult  Javad: [phone] ([phone])    Patient Name: Amena Blackwood  YOB: 1975    Date of Initial Consult: 04/17/2018  Reason for Consult: Overwhelming Symptoms  Requesting Provider: Marichuy Panchal MD   Primary Care Physician: None     SUMMARY:   Amena Blackwood is a 39 y. o. with a past history of obesity, venous stasis, chronic pain, who was admitted on 4/15/2018 from home with a diagnosis of cellulitis of right lower extremity. Patient has history of chronic venous stasis with [facility] but had began to do his own wound care when he could arrange transportation. Presented to the ED with complaints of increasing pain and foul smelling discharge for the right leg. Was admitted and started on vancomycin and zosyn. Wound cultures growing diphtheroids, GNR, and pseudomonas. Vascular surgery consulted and venous dopplers negative for DVT, and no evidence of arterial insufficiency. Recommend antibiotics, compression and elevation. Current medical issues leading to Palliative Medicine involvement include: overwhelming symptoms, acute pain related to venous stasis ulcers and cellulitis. .    SH: Single, lives alone, works as a contractor. Interim History:  4/18: Creat increased overnight to 2; antibiotics changed from vanc/zosyn to cefepime for cellulitis. Continues with pain, currently 9/10. PALLIATIVE DIAGNOSES:   1. Acute right leg pain  2. Chronic pain  3. Physical debility  4. Chronic venous stasis       PLAN:   1. Patient continues to have difficulty with pain management. Pain 9/10 now after dressings changed. Some relief in the morning after methadone and with around the clock oxycodone 20mg Q4 and dilaudid 0.5mg IV Q4hr. 1. Recommend weaning off IV dilaudid, I told him I would be stopping this today. I left Dilaudid 0.5mg IV Q12 PRN -- to be used as needed for wound care. 2. Increase oxycodone to 25mg PO Q4hr PRN.  Hold for sedation or confusion  2. Patient reports he has been on and off methadone for chronic pain for over 10 years, and currently goes [employer] daily for his methadone dose of 110 mg daily. 3. Initial consult note routed to primary continuity provider  4. Communicated plan of care with: Palliative IDT       GOALS OF CARE / TREATMENT PREFERENCES:     GOALS OF CARE: full restorative treatment in an effort to recover and return home  Patient/Health Care Proxy Stated Goals: Cure      TREATMENT PREFERENCES:   Code Status: Full Code    Advance Care Planning:  Advance Care Planning 4/16/2018   Patient's Healthcare Decision Maker is: Verbal statement (Legal Next of Kin remains as decision maker)   Primary Decision Maker Name Primitivo Jeffery (friend)    Primary Decision Maker Phone Number [phone]   Primary Decision Maker Relationship to Patient Friend   Confirm Advance Directive None   Patient Would Like to Complete Advance Directive -       Medical Interventions: Full interventions   Other Instructions:   Artificially Administered Nutrition:  (not addressed)     Other:    As far as possible, the palliative care team has discussed with patient / health care proxy about goals of care / treatment preferences for patient. HISTORY:     History obtained from: patient, chart    CHIEF COMPLAINT: right leg pain    HPI/SUBJECTIVE:    The patient is:   [x] Verbal and participatory  [] Non-participatory due to:     Patient has history of chronic venous stasis with ulcers that he was going to the [address] [place] [address] Road for but had began to do his own wound care when he could arrange transportation. Presented to the ED with complaints of increasing pain and foul smelling discharge for the right leg. Was admitted and started on vancomycin and zosyn. Wound cultures growing diphtheroids, GNR, and pseudomonas.  Vascular surgery consulted and venous dopplers negative for DVT, and no evidence of arterial insufficiency. Recommend antibiotics, compression and elevation. 4/18: Currently has p.o. oxycodone 15 mg every four hours as needed for pain and IV dilaudid 1 mg every four hours for severe pain/breakthrough pain. Reports that the lowest his pain scale got was 8-9/10, but does report that the oral provides better pain control than the IV. Clinical Pain Assessment (nonverbal scale for severity on nonverbal patients):   Clinical Pain Assessment  Severity: 6  Location: right LE  Character: throbbing, burning  Duration: chronic; worse since Saturday  Effect: limited movement/weight bearing  Factors: walking, increased edema  Frequency: constant          Duration: for how long has pt been experiencing pain (e.g., 2 days, 1 month, years)  Frequency: how often pain is an issue (e.g., several times per day, once every few days, constant)     FUNCTIONAL ASSESSMENT:     Palliative Performance Scale (PPS):  PPS: 70       PSYCHOSOCIAL/SPIRITUAL SCREENING:     Palliative IDT has assessed this patient for cultural preferences / practices and a referral made as appropriate to needs (Cultural Services, Patient Advocacy, Ethics, etc.)    Advance Care Planning:  Advance Care Planning 4/16/2018   Patient's Healthcare Decision Maker is: Verbal statement (Legal Next of Kin remains as decision maker)   Primary Decision Maker Name Primitivo Jeffery (friend)    Primary Decision Maker Phone Number [phone]   Primary Decision Maker Relationship to Patient Friend   Confirm Advance Directive None   Patient Would Like to Complete Advance Directive -       Any spiritual / Holiness concerns:  [] Yes /  [x] No    Caregiver Burnout:  [] Yes /  [] No /  [x] No Caregiver Present      Anticipatory grief assessment:   [x] Normal  / [] Maladaptive       ESAS Anxiety:      ESAS Depression:          REVIEW OF SYSTEMS:     Positive and pertinent negative findings in ROS are noted above in HPI.   The following systems were [x] reviewed / [] unable to be reviewed as noted in HPI  Other findings are noted below. Systems: constitutional, ears/nose/mouth/throat, respiratory, gastrointestinal, genitourinary, musculoskeletal, integumentary, neurologic, psychiatric, endocrine. Positive findings noted below. Modified ESAS Completed by: provider     Drowsiness: 0     Pain: 6           Dyspnea: 0     Constipation: No     Stool Occurrence(s): 1        PHYSICAL EXAM:     From RN flowsheet:  Wt Readings from Last 3 Encounters:   04/22/18 98.6 kg (217 lb 6 oz)   01/23/18 102.1 kg (225 lb)   01/14/18 95.3 kg (210 lb)     Blood pressure 115/72, pulse 92, temperature 98 °F (36.7 °C), resp. rate 18, height 5' 7\" (1.702 m), weight 98.6 kg (217 lb 6 oz), SpO2 99 %. Pain Scale 1: Numeric (0 - 10)  Pain Intensity 1: 9  Pain Onset 1: acute  Pain Location 1: Leg  Pain Orientation 1: Lower  Pain Description 1: Constant, Aching, Throbbing  Pain Intervention(s) 1: Medication (see MAR)  Last bowel movement, if known:     Constitutional: Uncomfortable appearing, NAD, sitting up in chair  Eyes: pupils equal, anicteric  ENMT: no nasal discharge, moist mucous membranes  Cardiovascular: regular rhythm, +2 pitting COLLEEN in right foot, +1 pitting in left foot; remainder of lower legs wrapped in kerlex up to the knee. Dressings c/d/i.    Respiratory: breathing not labored, symmetric  Gastrointestinal: soft non-tender, +bowel sounds  Musculoskeletal: no deformity, no tenderness to palpation  Skin: warm, dry  Neurologic: following commands, moving all extremities  Psychiatric: full affect, no hallucinations  Other:       HISTORY:     Principal Problem:    Cellulitis (4/15/2018)    Active Problems:    Open leg wound (1/14/2018)      Chronic cutaneous venous stasis ulcer ([address] (1/14/2018)      HTN (hypertension) (1/14/2018)      Chronic pain (1/14/2018)      Obesity (BMI 30-39.9) (4/15/2018)      Hyperglycemia (4/15/2018)      Sepsis ([address] (4/15/2018)      Tobacco abuse (4/15/2018) Leukocytosis (4/16/2018)      Anemia (4/16/2018)      Hypomagnesemia (4/16/2018)      Hypophosphatemia (4/16/2018)      Past Medical History:   Diagnosis Date    Ill-defined condition     venous stasis ulcers    Obesity (BMI 30-39.9) 4/15/2018      Past Surgical History:   Procedure Laterality Date    HX CORONARY STENT PLACEMENT        Family History   Problem Relation Age of Onset    Family history unknown: Yes      History reviewed, no pertinent family history.   Social History   Substance Use Topics    Smoking status: Current Every Day Smoker     Packs/day: 1.00    Smokeless tobacco: Never Used    Alcohol use No     Allergies   Allergen Reactions    Tylenol [Acetaminophen] Other (comments)     abd pain         Current Facility-Administered Medications   Medication Dose Route Frequency    oxyCODONE IR (ROXICODONE) tablet 25 mg  25 mg Oral Q4H PRN    HYDROmorphone (DILAUDID) injection 0.5 mg  0.5 mg IntraVENous Q12H PRN    clindamycin (CLEOCIN) 600mg NS 50 mL IVPB (premix)  600 mg IntraVENous Q8H    meropenem (MERREM) 500 mg in 0.9% sodium chloride (MBP/ADV) 50 mL MBP  500 mg IntraVENous Q8H    polyethylene glycol (MIRALAX) packet 17 g  17 g Oral DAILY    senna-docusate (PERICOLACE) 8.6-50 mg per tablet 1 Tab  1 Tab Oral QHS    naloxone (NARCAN) injection 0.4 mg  0.4 mg IntraVENous EVERY 2 MINUTES AS NEEDED    methadone (DOLOPHINE) tablet 110 mg  110 mg Oral DAILY    0.9% sodium chloride infusion  50 mL/hr IntraVENous CONTINUOUS    sodium chloride (NS) flush 5-10 mL  5-10 mL IntraVENous Q8H    sodium chloride (NS) flush 5-10 mL  5-10 mL IntraVENous PRN    acetaminophen (TYLENOL) tablet 650 mg  650 mg Oral Q4H PRN    prochlorperazine (COMPAZINE) injection 10 mg  10 mg IntraVENous Q6H PRN    zolpidem (AMBIEN) tablet 5 mg  5 mg Oral QHS PRN    enoxaparin (LOVENOX) injection 40 mg  40 mg SubCUTAneous Q24H    aluminum-magnesium hydroxide (MAALOX) oral suspension 30 mL  30 mL Oral Q6H PRN    sodium chloride (NS) flush 5-10 mL  5-10 mL IntraVENous PRN          LAB AND IMAGING FINDINGS:     Lab Results   Component Value Date/Time    WBC 13.3 (H) 04/23/2018 03:16 AM    HGB 11.1 (L) 04/23/2018 03:16 AM    PLATELET 424 (H) 99/67/5188 03:16 AM     Lab Results   Component Value Date/Time    Sodium 136 04/23/2018 03:16 AM    Potassium 5.7 (H) 04/23/2018 03:16 AM    Chloride 100 04/23/2018 03:16 AM    CO2 31 04/23/2018 03:16 AM    BUN 21 (H) 04/23/2018 03:16 AM    Creatinine 1.92 (H) 04/23/2018 03:16 AM    Calcium 9.0 04/23/2018 03:16 AM    Magnesium 1.9 04/17/2018 02:44 AM    Phosphorus 4.1 04/22/2018 12:38 AM      Lab Results   Component Value Date/Time    AST (SGOT) 21 04/15/2018 09:29 PM    Alk. phosphatase 80 04/15/2018 09:29 PM    Protein, total 7.0 04/15/2018 09:29 PM    Albumin 2.2 (L) 04/22/2018 12:38 AM    Globulin 3.7 04/15/2018 09:29 PM     No results found for: INR, PTMR, PTP, PT1, PT2, APTT   Lab Results   Component Value Date/Time    Iron 9 (L) 04/17/2018 02:44 AM    TIBC 219 (L) 04/17/2018 02:44 AM    Iron % saturation 4 (L) 04/17/2018 02:44 AM    Ferritin 285 04/17/2018 02:44 AM      No results found for: PH, PCO2, PO2  No components found for: GLPOC   No results found for: CPK, CKMB             Total time:   Counseling / coordination time, spent as noted above:  > 50% counseling / coordination?:    Prolonged service was provided for  []30 min   []75 min in face to face time in the presence of the patient, spent as noted above. Time Start:   Time End:   Note: this can only be billed with 45924 (initial) or 37505 (follow up). If multiple start / stop times, list each separately.

## 2018-04-23 NOTE — PROGRESS NOTES
Addison Girard [facility] [address]  [address] [facility], [place], [id number] [place] [address]  [phone]      Medical Progress Note      NAME: Daysi White   :  1975  MRM:  [id number]    Date/Time: 2018  12:22 PM       Assessment and Plan:   1. Cellulitis / Open leg wound / Chronic cutaneous venous stasis ulcer - POA, worsening since he left wound clinic 2 months ago due to financial problems. US negative for DVT. evaluated by Vascular surgery and recommended compression and elevation. Heavy diphtheroids, providencia, heavy strep, beta hemolytic group C and pseudomonas showed on Cx. ID  Evaluation appreciated. Changed ABx to meropenem and clindamycin for now. Consulted general surgery for possible debridement, which will be done in the next few days. Also, evaluated by podiatry. 2.  Sepsis / Leukocytosis / Sinus tachycardia - (POA). NOT severe sepsis. Resolved      3. Anemia, mild and stable. Monitor      4. Chronic pain - Continue usual methadone. Palliative consulted for additional leg pain managment     5. Obesity (BMI 30-39.9) - Advise weight loss.      6. Tobacco abuse - Advise cessation. Contributes to poor LE circulation and healing. 7.  KATHARINE. Possible ATN  from vancomycin. Renal evaluation appreciated. Monitor renal function    8. Hyperkalemia. Recurrent. Nephrology following. Will give veltassa                   Subjective:     Chief Complaint:  Follow up of pt who was admitted with cellulitis. C/o severe leg pain     ROS:  (bold if positive, if negative)      Tolerating PT  Tolerating Diet        Objective:     Last 24hrs VS reviewed since prior progress note.  Most recent are:    Visit Vitals    /75 (BP 1 Location: Left arm, BP Patient Position: At rest)    Pulse 81    Temp 99.4 °F (37.4 °C)    Resp 18    Ht 5' 7\" (1.702 m)    Wt 98.6 kg (217 lb 6 oz)    SpO2 95%    BMI 34.05 kg/m2     SpO2 Readings from Last 6 Encounters:   18 95%   18 98%   01/15/18 98% 06/25/13 93%            Intake/Output Summary (Last 24 hours) at 04/23/18 0820  Last data filed at 04/23/18 7495   Gross per 24 hour   Intake         73781.66 ml   Output             2425 ml   Net          8881.66 ml        Physical Exam:    Gen:  Well-developed, well-nourished, in no acute distress  HEENT:  Pink conjunctivae, PERRL, hearing intact to voice, moist mucous membranes  Neck:  Supple, without masses, thyroid non-tender  Resp:  No accessory muscle use, clear breath sounds without wheezes rales or rhonchi  Card:  No murmurs, normal S1, S2 without thrills, bruits or peripheral edema  Abd:  Soft, non-tender, non-distended, normoactive bowel sounds are present, no palpable organomegaly and no detectable hernias  Lymph:  No cervical or inguinal adenopathy  Musc:  No cyanosis or clubbing  Skin:  Chronic leg ulcer and hyperemia.    Neuro:  Cranial nerves are grossly intact, no focal motor weakness, follows commands appropriately  Psych:  Good insight, oriented to person, place and time, alert  __________________________________________________________________  Medications Reviewed: (see below)  Medications:     Current Facility-Administered Medications   Medication Dose Route Frequency    clindamycin (CLEOCIN) 600mg NS 50 mL IVPB (premix)  600 mg IntraVENous Q8H    meropenem (MERREM) 500 mg in 0.9% sodium chloride (MBP/ADV) 50 mL MBP  500 mg IntraVENous Q8H    HYDROmorphone (DILAUDID) injection 0.5 mg  0.5 mg IntraVENous Q4H PRN    oxyCODONE IR (ROXICODONE) tablet 20 mg  20 mg Oral Q4H PRN    polyethylene glycol (MIRALAX) packet 17 g  17 g Oral DAILY    senna-docusate (PERICOLACE) 8.6-50 mg per tablet 1 Tab  1 Tab Oral QHS    naloxone (NARCAN) injection 0.4 mg  0.4 mg IntraVENous EVERY 2 MINUTES AS NEEDED    methadone (DOLOPHINE) tablet 110 mg  110 mg Oral DAILY    0.9% sodium chloride infusion  50 mL/hr IntraVENous CONTINUOUS    sodium chloride (NS) flush 5-10 mL  5-10 mL IntraVENous Q8H    sodium chloride (NS) flush 5-10 mL  5-10 mL IntraVENous PRN    acetaminophen (TYLENOL) tablet 650 mg  650 mg Oral Q4H PRN    prochlorperazine (COMPAZINE) injection 10 mg  10 mg IntraVENous Q6H PRN    zolpidem (AMBIEN) tablet 5 mg  5 mg Oral QHS PRN    enoxaparin (LOVENOX) injection 40 mg  40 mg SubCUTAneous Q24H    aluminum-magnesium hydroxide (MAALOX) oral suspension 30 mL  30 mL Oral Q6H PRN    sodium chloride (NS) flush 5-10 mL  5-10 mL IntraVENous PRN        Lab Data Reviewed: (see below)  Lab Review:     Recent Labs      04/23/18 0316 04/22/18 0038   WBC  13.3*  13.9*   HGB  11.1*  11.1*   HCT  35.6*  35.3*   PLT  504*  459*     Recent Labs      04/23/18 0316 04/22/18 0038 04/21/18   0206   NA  136  135*  135*   K  5.7*  4.5  5.7*   CL  100  99  100   CO2  31  30  26   GLU  108*  110*  109*   BUN  21*  22*  23*   CREA  1.92*  2.03*  2.17*   CA  9.0  8.6  9.1   PHOS   --   4.1   --    ALB   --   2.2*   --      No results found for: GLUCPOC  No results for input(s): PH, PCO2, PO2, HCO3, FIO2 in the last 72 hours. No results for input(s): INR in the last 72 hours.     No lab exists for component: Theodore Guzman  All Micro Results     Procedure Component Value Units Date/Time    CULTURE, BLOOD [[id number]] Collected:  04/15/18 2129    Order Status:  Completed Specimen:  Blood from Blood Updated:  04/22/18 0740     Special Requests: NO SPECIAL REQUESTS        Culture result: NO GROWTH 6 DAYS       CULTURE, WOUND Caro Beech STAIN [[id number]]  (Abnormal)  (Susceptibility) Collected:  04/16/18 0016    Order Status:  Completed Specimen:  Wound from Wound Drainage Updated:  04/19/18 1026     Special Requests: NO SPECIAL REQUESTS        GRAM STAIN OCCASIONAL WBCS SEEN         2+ GRAM NEGATIVE RODS                 1+ GRAM POSITIVE COCCI (IN PAIRS)              FEW GRAM POSITIVE RODS (CORYNEFORM)     Culture result:         HEAVY PROVIDENCIA RETTGERI (A)              HEAVY PSEUDOMONAS AERUGINOSA (A)              LIGHT KLEBSIELLA OXYTOCA (A)              HEAVY STREPTOCOCCI, BETA HEMOLYTIC GROUP C Penicillin and ampicillin are drugs of choice for treatment of beta-hemolytic streptococcal infections. Susceptibility testing of penicillins and beta-lactams approved by the FDA for treatment of beta-hemolytic streptococcal infections need not be performed routinely, because nonsusceptible isolates are extremely rare. CLSI 2012 (A)      HEAVY DIPHTHEROIDS (A)             I have reviewed notes of prior 24hr. Other pertinent lab:       Total time spent with patient: [address] discussed with: Patient, Nursing Staff and >50% of time spent in counseling and coordination of care    Discussed:  Care Plan    Prophylaxis:  Lovenox    Disposition:  Home w/Family           ___________________________________________________    Attending Physician: Jonathan Artis MD

## 2018-04-23 NOTE — PROCEDURES
[address]  *** FINAL REPORT ***    Name: Claudio Olvera  MRN: [id number]    Inpatient  : 11 Mar 1975  HIS Order #: [id number]  [id number] [facility] Visit #: [id number]  Date: 2018    TYPE OF TEST: Peripheral Arterial Testing    REASON FOR TEST  Extremity ulceration (both sides)    Right Leg  Segmentals:                     mmHg  Brachial  High thigh  Low thigh  Calf  Posterior tibial  Dorsalis pedis  Peroneal  Metatarsal  Toe pressure     124  Doppler:    Normal  PVR:        Normal  Ankle/Brachial:    Left Leg  Segmentals:                     mmHg  Brachial         115  High thigh  Low thigh  Calf  Posterior tibial  Dorsalis pedis  Peroneal  Metatarsal  Toe pressure     113  Doppler:    Normal  PVR:        Normal  Ankle/Brachial:    INTERPRETATION/FINDINGS  PROCEDURE:  Evaluation of lower extremity arteries with systolic blood   pressure measurement at the ankle and brachial level and calculation  of the ankle/brachial pressure index (WESLY). The exam may also include   pulse volume recording (PVR) plethysmography at the ankle level. CONCLUSION: Limitied exam due to patient ability to tolerate exam.  Triphasic Doppler waveforms noted throughout bilateral lower extremity   arteries evaluated. Unable to obtain ankle brachil index blaterally  due to patient inability to tolerate cuff pressure. Right toe/brachial   index is 1.08 and left toe/brachial index is 0.98. ADDITIONAL COMMENTS    I have personally reviewed the data relevant to the interpretation of  this  study. TECHNOLOGIST: VENKAT Rader  Signed: 2018 03:57 PM    PHYSICIAN: Jennifer Miller.  Sarkis Shannon MD  Signed: 2018 04:49 PM

## 2018-04-24 ENCOUNTER — ANESTHESIA (OUTPATIENT)
Dept: SURGERY | Age: 43
DRG: 853 | End: 2018-04-24
Payer: SELF-PAY

## 2018-04-24 ENCOUNTER — ANESTHESIA EVENT (OUTPATIENT)
Dept: SURGERY | Age: 43
DRG: 853 | End: 2018-04-24
Payer: SELF-PAY

## 2018-04-24 LAB
ANION GAP SERPL CALC-SCNC: 6 MMOL/L (ref 5–15)
BASOPHILS # BLD: 0.1 K/UL (ref 0–0.1)
BASOPHILS NFR BLD: 1 % (ref 0–1)
BUN SERPL-MCNC: 23 MG/DL (ref 6–20)
BUN/CREAT SERPL: 12 (ref 12–20)
CALCIUM SERPL-MCNC: 8.8 MG/DL (ref 8.5–10.1)
CHLORIDE SERPL-SCNC: 100 MMOL/L (ref 97–108)
CO2 SERPL-SCNC: 28 MMOL/L (ref 21–32)
CREAT SERPL-MCNC: 1.91 MG/DL (ref 0.7–1.3)
DIFFERENTIAL METHOD BLD: ABNORMAL
EOSINOPHIL # BLD: 0.2 K/UL (ref 0–0.4)
EOSINOPHIL NFR BLD: 2 % (ref 0–7)
ERYTHROCYTE [DISTWIDTH] IN BLOOD BY AUTOMATED COUNT: 14.6 % (ref 11.5–14.5)
GLUCOSE SERPL-MCNC: 104 MG/DL (ref 65–100)
HCT VFR BLD AUTO: 35.2 % (ref 36.6–50.3)
HGB BLD-MCNC: 11.2 G/DL (ref 12.1–17)
IMM GRANULOCYTES # BLD: 0 K/UL
IMM GRANULOCYTES NFR BLD AUTO: 0 %
LYMPHOCYTES # BLD: 1.8 K/UL (ref 0.8–3.5)
LYMPHOCYTES NFR BLD: 18 % (ref 12–49)
MCH RBC QN AUTO: 26.1 PG (ref 26–34)
MCHC RBC AUTO-ENTMCNC: 31.8 G/DL (ref 30–36.5)
MCV RBC AUTO: 82.1 FL (ref 80–99)
MONOCYTES # BLD: 0.8 K/UL (ref 0–1)
MONOCYTES NFR BLD: 8 % (ref 5–13)
MYELOCYTES NFR BLD MANUAL: 1 %
NEUTS BAND NFR BLD MANUAL: 1 % (ref 0–6)
NEUTS SEG # BLD: 7 K/UL (ref 1.8–8)
NEUTS SEG NFR BLD: 69 % (ref 32–75)
NRBC # BLD: 0 K/UL (ref 0–0.01)
NRBC BLD-RTO: 0 PER 100 WBC
PLATELET # BLD AUTO: 492 K/UL (ref 150–400)
PMV BLD AUTO: 8.4 FL (ref 8.9–12.9)
POTASSIUM SERPL-SCNC: 4.7 MMOL/L (ref 3.5–5.1)
RBC # BLD AUTO: 4.29 M/UL (ref 4.1–5.7)
RBC MORPH BLD: ABNORMAL
SODIUM SERPL-SCNC: 134 MMOL/L (ref 136–145)
WBC # BLD AUTO: 10 K/UL (ref 4.1–11.1)

## 2018-04-24 PROCEDURE — 74011250636 HC RX REV CODE- 250/636: Performed by: INTERNAL MEDICINE

## 2018-04-24 PROCEDURE — 74011250637 HC RX REV CODE- 250/637: Performed by: INTERNAL MEDICINE

## 2018-04-24 PROCEDURE — 76210000016 HC OR PH I REC 1 TO 1.5 HR: Performed by: SURGERY

## 2018-04-24 PROCEDURE — 74011250636 HC RX REV CODE- 250/636

## 2018-04-24 PROCEDURE — 77030011267 HC ELECTRD BLD COVD -A: Performed by: SURGERY

## 2018-04-24 PROCEDURE — 85025 COMPLETE CBC W/AUTO DIFF WBC: CPT | Performed by: INTERNAL MEDICINE

## 2018-04-24 PROCEDURE — 77030020268 HC MISC GENERAL SUPPLY: Performed by: SURGERY

## 2018-04-24 PROCEDURE — 77030013579 HC DRSG WND CA ALG BMS -A

## 2018-04-24 PROCEDURE — 76060000032 HC ANESTHESIA 0.5 TO 1 HR: Performed by: SURGERY

## 2018-04-24 PROCEDURE — 74011000250 HC RX REV CODE- 250: Performed by: SURGERY

## 2018-04-24 PROCEDURE — 0JDP0ZZ EXTRACTION OF LEFT LOWER LEG SUBCUTANEOUS TISSUE AND FASCIA, OPEN APPROACH: ICD-10-PCS | Performed by: SURGERY

## 2018-04-24 PROCEDURE — 36415 COLL VENOUS BLD VENIPUNCTURE: CPT | Performed by: INTERNAL MEDICINE

## 2018-04-24 PROCEDURE — 74011250636 HC RX REV CODE- 250/636: Performed by: ANESTHESIOLOGY

## 2018-04-24 PROCEDURE — 65270000029 HC RM PRIVATE

## 2018-04-24 PROCEDURE — 77030020782 HC GWN BAIR PAWS FLX 3M -B

## 2018-04-24 PROCEDURE — 77030020143 HC AIRWY LARYN INTUB CGAS -A: Performed by: ANESTHESIOLOGY

## 2018-04-24 PROCEDURE — 74011250636 HC RX REV CODE- 250/636: Performed by: SURGERY

## 2018-04-24 PROCEDURE — 77030011640 HC PAD GRND REM COVD -A: Performed by: SURGERY

## 2018-04-24 PROCEDURE — 51798 US URINE CAPACITY MEASURE: CPT

## 2018-04-24 PROCEDURE — 74011000258 HC RX REV CODE- 258: Performed by: INTERNAL MEDICINE

## 2018-04-24 PROCEDURE — 77030018836 HC SOL IRR NACL ICUM -A: Performed by: SURGERY

## 2018-04-24 PROCEDURE — 76010000138 HC OR TIME 0.5 TO 1 HR: Performed by: SURGERY

## 2018-04-24 PROCEDURE — 74011000250 HC RX REV CODE- 250

## 2018-04-24 PROCEDURE — 0JDN0ZZ EXTRACTION OF RIGHT LOWER LEG SUBCUTANEOUS TISSUE AND FASCIA, OPEN APPROACH: ICD-10-PCS | Performed by: SURGERY

## 2018-04-24 PROCEDURE — 80048 BASIC METABOLIC PNL TOTAL CA: CPT | Performed by: INTERNAL MEDICINE

## 2018-04-24 RX ORDER — PROPOFOL 10 MG/ML
INJECTION, EMULSION INTRAVENOUS AS NEEDED
Status: DISCONTINUED | OUTPATIENT
Start: 2018-04-24 | End: 2018-04-24 | Stop reason: HOSPADM

## 2018-04-24 RX ORDER — HYDROMORPHONE HYDROCHLORIDE 2 MG/ML
1 INJECTION, SOLUTION INTRAMUSCULAR; INTRAVENOUS; SUBCUTANEOUS
Status: DISCONTINUED | OUTPATIENT
Start: 2018-04-24 | End: 2018-04-25

## 2018-04-24 RX ORDER — MIDAZOLAM HYDROCHLORIDE 1 MG/ML
INJECTION, SOLUTION INTRAMUSCULAR; INTRAVENOUS AS NEEDED
Status: DISCONTINUED | OUTPATIENT
Start: 2018-04-24 | End: 2018-04-24 | Stop reason: HOSPADM

## 2018-04-24 RX ORDER — SILVER SULFADIAZINE 10 G/1000G
CREAM TOPICAL AS NEEDED
Status: DISCONTINUED | OUTPATIENT
Start: 2018-04-24 | End: 2018-04-24 | Stop reason: HOSPADM

## 2018-04-24 RX ORDER — ONDANSETRON 2 MG/ML
INJECTION INTRAMUSCULAR; INTRAVENOUS AS NEEDED
Status: DISCONTINUED | OUTPATIENT
Start: 2018-04-24 | End: 2018-04-24 | Stop reason: HOSPADM

## 2018-04-24 RX ORDER — DIPHENHYDRAMINE HYDROCHLORIDE 50 MG/ML
12.5 INJECTION, SOLUTION INTRAMUSCULAR; INTRAVENOUS AS NEEDED
Status: DISCONTINUED | OUTPATIENT
Start: 2018-04-24 | End: 2018-04-24 | Stop reason: HOSPADM

## 2018-04-24 RX ORDER — SODIUM CHLORIDE, SODIUM LACTATE, POTASSIUM CHLORIDE, CALCIUM CHLORIDE 600; 310; 30; 20 MG/100ML; MG/100ML; MG/100ML; MG/100ML
125 INJECTION, SOLUTION INTRAVENOUS CONTINUOUS
Status: DISCONTINUED | OUTPATIENT
Start: 2018-04-24 | End: 2018-04-24 | Stop reason: HOSPADM

## 2018-04-24 RX ORDER — DEXAMETHASONE SODIUM PHOSPHATE 4 MG/ML
INJECTION, SOLUTION INTRA-ARTICULAR; INTRALESIONAL; INTRAMUSCULAR; INTRAVENOUS; SOFT TISSUE AS NEEDED
Status: DISCONTINUED | OUTPATIENT
Start: 2018-04-24 | End: 2018-04-24 | Stop reason: HOSPADM

## 2018-04-24 RX ORDER — FENTANYL CITRATE 50 UG/ML
INJECTION, SOLUTION INTRAMUSCULAR; INTRAVENOUS AS NEEDED
Status: DISCONTINUED | OUTPATIENT
Start: 2018-04-24 | End: 2018-04-24 | Stop reason: HOSPADM

## 2018-04-24 RX ORDER — MIDAZOLAM HYDROCHLORIDE 1 MG/ML
2 INJECTION, SOLUTION INTRAMUSCULAR; INTRAVENOUS
Status: DISCONTINUED | OUTPATIENT
Start: 2018-04-24 | End: 2018-04-24 | Stop reason: HOSPADM

## 2018-04-24 RX ORDER — HYDROMORPHONE HYDROCHLORIDE 2 MG/ML
.25-1 INJECTION, SOLUTION INTRAMUSCULAR; INTRAVENOUS; SUBCUTANEOUS
Status: DISCONTINUED | OUTPATIENT
Start: 2018-04-24 | End: 2018-04-24 | Stop reason: HOSPADM

## 2018-04-24 RX ORDER — LIDOCAINE HYDROCHLORIDE 20 MG/ML
INJECTION, SOLUTION EPIDURAL; INFILTRATION; INTRACAUDAL; PERINEURAL AS NEEDED
Status: DISCONTINUED | OUTPATIENT
Start: 2018-04-24 | End: 2018-04-24 | Stop reason: HOSPADM

## 2018-04-24 RX ADMIN — Medication 10 ML: at 05:46

## 2018-04-24 RX ADMIN — CLINDAMYCIN PHOSPHATE 600 MG: 600 INJECTION, SOLUTION INTRAVENOUS at 09:25

## 2018-04-24 RX ADMIN — MIDAZOLAM HYDROCHLORIDE 3 MG: 1 INJECTION, SOLUTION INTRAMUSCULAR; INTRAVENOUS at 17:01

## 2018-04-24 RX ADMIN — DEXAMETHASONE SODIUM PHOSPHATE 4 MG: 4 INJECTION, SOLUTION INTRA-ARTICULAR; INTRALESIONAL; INTRAMUSCULAR; INTRAVENOUS; SOFT TISSUE at 17:15

## 2018-04-24 RX ADMIN — OXYCODONE HYDROCHLORIDE 25 MG: 5 TABLET ORAL at 01:42

## 2018-04-24 RX ADMIN — MEROPENEM 500 MG: 500 INJECTION, POWDER, FOR SOLUTION INTRAVENOUS at 09:25

## 2018-04-24 RX ADMIN — OXYCODONE HYDROCHLORIDE 25 MG: 5 TABLET ORAL at 20:01

## 2018-04-24 RX ADMIN — HYDROMORPHONE HYDROCHLORIDE 1 MG: 2 INJECTION INTRAMUSCULAR; INTRAVENOUS; SUBCUTANEOUS at 18:31

## 2018-04-24 RX ADMIN — FENTANYL CITRATE 50 MCG: 50 INJECTION, SOLUTION INTRAMUSCULAR; INTRAVENOUS at 17:55

## 2018-04-24 RX ADMIN — OXYCODONE HYDROCHLORIDE 25 MG: 5 TABLET ORAL at 09:25

## 2018-04-24 RX ADMIN — SODIUM CHLORIDE 50 ML/HR: 900 INJECTION, SOLUTION INTRAVENOUS at 20:56

## 2018-04-24 RX ADMIN — HYDROMORPHONE HYDROCHLORIDE 1 MG: 2 INJECTION INTRAMUSCULAR; INTRAVENOUS; SUBCUTANEOUS at 18:44

## 2018-04-24 RX ADMIN — MEROPENEM 500 MG: 500 INJECTION, POWDER, FOR SOLUTION INTRAVENOUS at 22:19

## 2018-04-24 RX ADMIN — METHADONE HYDROCHLORIDE 110 MG: 10 TABLET ORAL at 09:24

## 2018-04-24 RX ADMIN — ONDANSETRON 4 MG: 2 INJECTION INTRAMUSCULAR; INTRAVENOUS at 17:15

## 2018-04-24 RX ADMIN — MEROPENEM 500 MG: 500 INJECTION, POWDER, FOR SOLUTION INTRAVENOUS at 15:42

## 2018-04-24 RX ADMIN — HYDROMORPHONE HYDROCHLORIDE 1 MG: 2 INJECTION INTRAMUSCULAR; INTRAVENOUS; SUBCUTANEOUS at 22:33

## 2018-04-24 RX ADMIN — PROPOFOL 200 MG: 10 INJECTION, EMULSION INTRAVENOUS at 17:06

## 2018-04-24 RX ADMIN — MEROPENEM 500 MG: 500 INJECTION, POWDER, FOR SOLUTION INTRAVENOUS at 03:00

## 2018-04-24 RX ADMIN — LIDOCAINE HYDROCHLORIDE 80 MG: 20 INJECTION, SOLUTION EPIDURAL; INFILTRATION; INTRACAUDAL; PERINEURAL at 17:06

## 2018-04-24 RX ADMIN — OXYCODONE HYDROCHLORIDE 25 MG: 5 TABLET ORAL at 05:44

## 2018-04-24 RX ADMIN — CLINDAMYCIN PHOSPHATE 600 MG: 600 INJECTION, SOLUTION INTRAVENOUS at 01:19

## 2018-04-24 RX ADMIN — FENTANYL CITRATE 50 MCG: 50 INJECTION, SOLUTION INTRAMUSCULAR; INTRAVENOUS at 17:01

## 2018-04-24 RX ADMIN — MIDAZOLAM HYDROCHLORIDE 2 MG: 1 INJECTION, SOLUTION INTRAMUSCULAR; INTRAVENOUS at 17:06

## 2018-04-24 RX ADMIN — Medication 10 ML: at 22:00

## 2018-04-24 NOTE — PROGRESS NOTES
Medical Progress Note      NAME: Annette West   :  1975  MRM:  [id number]    Date/Time: 2018  9:29 AM       Assessment / Plan:     RLE cellulitis / Chronic cutaneous venous stasis ulcer:  Wounds followed at wound care clinic. US negative for DVT on admission. Evaluated by vascular surgery this admission and recommended compression and elevation. Wound culture with heavy diphtheroids, providencia, heavy strep, beta hemolytic group C and pseudomonas. -- continue meropenem and clindamycin per ID   -- plans for I&D in OR today       Mild chronic anemia:  HGB stable this admission. Folate and B12 WNL. Iron profile more c/w ACD.    -- monitor HGB    Chronic pain:  On chronic methadone. Appreciate palliative care managing acute / chronic pain. Obesity (BMI 30-39. 9): Will benefit from weight loss.      Tobacco abuse:  Already advised cessation.     KATHARINE:  Possibly drug related. Abx changed already. Appreciate renal eval.   -- monitor creatinine     Hyperkalemia:  Given SPS with improvement. -- follow K   -- low K diet    Microscopic hematuria:   -- repeat UA as outpatient    Sepsis:  Resolved              Subjective:     Chief Complaint:  Leg pain    Pain in legs not sig changed. No n/v.  Hungry. ROS:  (bold if positive, if negative)              Objective:       Vitals:          Last 24hrs VS reviewed since prior progress note.  Most recent are:    Visit Vitals    /81 (BP 1 Location: Right arm, BP Patient Position: Sitting)    Pulse 82    Temp 98.3 °F (36.8 °C)    Resp 18    Ht 5' 7\" (1.702 m)    Wt 98.4 kg (217 lb)    SpO2 98%    BMI 33.99 kg/m2     SpO2 Readings from Last 6 Encounters:   18 98%   18 98%   01/15/18 98%   13 93%          Intake/Output Summary (Last 24 hours) at 18 0903  Last data filed at 18 0547   Gross per 24 hour   Intake                0 ml   Output             1650 ml   Net            -1650 ml          Exam:     Physical Exam:    Gen:  Well-developed, well-nourished, in no acute distress  HEENT:  Pink conjunctivae, hearing intact to voice, moist mucous membranes  Resp:  No accessory muscle use, clear breath sounds without wheezes rales or rhonchi  Card:  No murmurs, normal S1, S2 without thrills, 1+ LE peripheral edema  Abd:  Soft, non-tender, non-distended, normoactive bowel sounds are present  Musc:  No cyanosis  Skin:  No rashes, B/L le wrapped with kerlix  Neuro:   Face symmetric, tongue midline, speech fluent,  strength is 5/5 bilaterally and dorsi / plantarflexion is 5/5 bilaterally, follows commands appropriately  Psych:  Good insight, oriented to person, place and time, alert    Medications Reviewed: (see below)    Lab Data Reviewed: (see below)    ______________________________________________________________________    Medications:     Current Facility-Administered Medications   Medication Dose Route Frequency    oxyCODONE IR (ROXICODONE) tablet 25 mg  25 mg Oral Q4H PRN    HYDROmorphone (DILAUDID) injection 0.5 mg  0.5 mg IntraVENous Q12H PRN    meropenem (MERREM) 500 mg in 0.9% sodium chloride (MBP/ADV) 50 mL MBP  500 mg IntraVENous Q6H    clindamycin (CLEOCIN) 600mg NS 50 mL IVPB (premix)  600 mg IntraVENous Q8H    polyethylene glycol (MIRALAX) packet 17 g  17 g Oral DAILY    senna-docusate (PERICOLACE) 8.6-50 mg per tablet 1 Tab  1 Tab Oral QHS    naloxone (NARCAN) injection 0.4 mg  0.4 mg IntraVENous EVERY 2 MINUTES AS NEEDED    methadone (DOLOPHINE) tablet 110 mg  110 mg Oral DAILY    0.9% sodium chloride infusion  50 mL/hr IntraVENous CONTINUOUS    sodium chloride (NS) flush 5-10 mL  5-10 mL IntraVENous Q8H    sodium chloride (NS) flush 5-10 mL  5-10 mL IntraVENous PRN    acetaminophen (TYLENOL) tablet 650 mg  650 mg Oral Q4H PRN    prochlorperazine (COMPAZINE) injection 10 mg  10 mg IntraVENous Q6H PRN    zolpidem (AMBIEN) tablet 5 mg  5 mg Oral QHS PRN    enoxaparin (LOVENOX) injection 40 mg  40 mg SubCUTAneous Q24H    aluminum-magnesium hydroxide (MAALOX) oral suspension 30 mL  30 mL Oral Q6H PRN    sodium chloride (NS) flush 5-10 mL  5-10 mL IntraVENous PRN            Lab Review:     Recent Labs      04/24/18 0454 04/23/18 0316 04/22/18 0038   WBC  10.0  13.3*  13.9*   HGB  11.2*  11.1*  11.1*   HCT  35.2*  35.6*  35.3*   PLT  492*  504*  459*     Recent Labs      04/24/18 0454 04/23/18 0316 04/22/18 0038   NA  134*  136  135*   K  4.7  5.7*  4.5   CL  100  100  99   CO2  28  31  30   GLU  104*  108*  110*   BUN  23*  21*  22*   CREA  1.91*  1.92*  2.03*   CA  8.8  9.0  8.6   PHOS   --    --   4.1   ALB   --    --   2.2*     No results found for: GLUCPOC      Total time spent with patient: [phone] [address] discussed with: Patient    Discussed:  Care Plan    Prophylaxis:  Lovenox    Disposition:  Home w/Family           ___________________________________________________    Attending Physician: Jair Obrien MD

## 2018-04-24 NOTE — BRIEF OP NOTE
BRIEF OPERATIVE NOTE    Date of Procedure: 4/24/2018   Preoperative Diagnosis: Right Lower extremity veinous ulcers  Postoperative Diagnosis: Right Lower extremity veinous ulcers    Procedure(s):  MECHANICAL DEBRIDEMENT BILATERAL LEG WOUNDS  Surgeon(s) and Role:     * Betsey Bowen MD - Primary    Surgical Staff:  Circ-1: Ronny Olsen RN  Circ-2: Elida Roach RN  Scrub Tech-1: Yarely Holland.  [facility]  Surg Asst-1: Jr Merlos RN  Event Time In   Incision Start 1718   Incision Close 1744     Anesthesia: General   Estimated Blood Loss: min  Specimens: * No specimens in log *   Findings: Full thickness lateral, medial wounds with breakdown to subcutaneous tissue  Complications: none  Implants: * No implants in log *        Dictation # [id number]

## 2018-04-24 NOTE — PROGRESS NOTES
[facility] Progress Note           Zuleika Ward DO    [phone] Office  [phone]  Fax    2018      Assessment & Plan:   1. Right lower extremity cellulitis in the setting of a nonhealing chronic venous stasis ulcers. Cultures growing diptheroids, providencia, klebsiella, pseudomonas, and group c streptococcus. Abx broadened to meropenem/clinda due to worsening infection per [facility]. General surgery planning debridement under anesthesia.    Compression and elevation recommended by vascular surgery. Stop clindamycin today  2. KATHARINE. Improving. Suspect related to abx. Stopped vancomycin and pip-tazo. Subjective:     Feeling better    Objective:     Vitals:   Visit Vitals    /81 (BP 1 Location: Right arm, BP Patient Position: Sitting)    Pulse 82    Temp 98.3 °F (36.8 °C)    Resp 18    Ht 5' 7\" (1.702 m)    Wt 98.4 kg (217 lb)    SpO2 98%    BMI 33.99 kg/m2        Tmax:  Temp (24hrs), Av.6 °F (37 °C), Min:98 °F (36.7 °C), Max:99.1 °F (37.3 °C)        Physical Examination:   General:  Alert, cooperative, no distress   Head:  Normocephalic, atraumatic. Eyes:  Conjunctivae clear   Neck: Supple       Lungs:   No distress. Chest wall:     Heart:     Abdomen:      Extremities: Legs dressed   Skin:     Neurologic: CNII-XII intact. Labs:        No lab exists for component: ITNL   No results for input(s): CPK, CKMB, TROIQ in the last 72 hours. Recent Labs      18   0454  18   0316  18   0038   NA  134*  136  135*   K  4.7  5.7*  4.5   CL  100  100  99   CO2  28  31  30   BUN  23*  21*  22*   CREA  1.91*  1.92*  2.03*   GLU  104*  108*  110*   PHOS   --    --   4.1   ALB   --    --   2.2*   WBC  10.0  13.3*  13.9*   HGB  11.2*  11.1*  11.1*   HCT  35.2*  35.6*  35.3*   PLT  492*  504*  459*     No results for input(s): INR, PTP, APTT in the last 72 hours.     No lab exists for component: INREXT, INREXT  Needs: urine analysis, urine sodium, protein and creatinine  Lab Results   Component Value Date/Time    Sodium urine, random 50 04/19/2018 01:56 PM    Creatinine, urine 82.29 04/19/2018 01:56 PM         Cultures:     No results found for: SDES  Lab Results   Component Value Date/Time    Culture result: HEAVY DIPHTHEROIDS (A) 04/16/2018 12:16 AM    Culture result: HEAVY PROVIDENCIA RETTGERI (A) 04/16/2018 12:16 AM    Culture result: HEAVY PSEUDOMONAS AERUGINOSA (A) 04/16/2018 12:16 AM    Culture result: LIGHT KLEBSIELLA OXYTOCA (A) 04/16/2018 12:16 AM    Culture result: (A) 04/16/2018 12:16 AM     HEAVY STREPTOCOCCI, BETA HEMOLYTIC GROUP C Penicillin and ampicillin are drugs of choice for treatment of beta-hemolytic streptococcal infections. Susceptibility testing of penicillins and beta-lactams approved by the FDA for treatment of beta-hemolytic streptococcal infections need not be performed routinely, because nonsusceptible isolates are extremely rare.  CLSI 2012       Radiology:     Medications       Current Facility-Administered Medications   Medication Dose Route Frequency Last Dose    oxyCODONE IR (ROXICODONE) tablet 25 mg  25 mg Oral Q4H PRN 25 mg at 04/24/18 0925    HYDROmorphone (DILAUDID) injection 0.5 mg  0.5 mg IntraVENous Q12H PRN 0.5 mg at 04/23/18 1837    meropenem (MERREM) 500 mg in 0.9% sodium chloride (MBP/ADV) 50 mL MBP  500 mg IntraVENous Q6H 500 mg at 04/24/18 0925    clindamycin (CLEOCIN) 600mg NS 50 mL IVPB (premix)  600 mg IntraVENous Q8H 600 mg at 04/24/18 0925    polyethylene glycol (MIRALAX) packet 17 g  17 g Oral DAILY Stopped at 04/23/18 0900    senna-docusate (PERICOLACE) 8.6-50 mg per tablet 1 Tab  1 Tab Oral QHS 1 Tab at 04/22/18 2040    naloxone (NARCAN) injection 0.4 mg  0.4 mg IntraVENous EVERY 2 MINUTES AS NEEDED      methadone (DOLOPHINE) tablet 110 mg  110 mg Oral DAILY 110 mg at 04/24/18 0924    0.9% sodium chloride infusion  50 mL/hr IntraVENous CONTINUOUS 50 mL/hr at 04/23/18 1457    sodium chloride (NS) flush 5-10 mL  5-10 mL IntraVENous Q8H 10 mL at 04/24/18 0546    sodium chloride (NS) flush 5-10 mL  5-10 mL IntraVENous PRN      acetaminophen (TYLENOL) tablet 650 mg  650 mg Oral Q4H  mg at 04/16/18 1957    prochlorperazine (COMPAZINE) injection 10 mg  10 mg IntraVENous Q6H PRN 10 mg at 04/16/18 0636    zolpidem (AMBIEN) tablet 5 mg  5 mg Oral QHS PRN      enoxaparin (LOVENOX) injection 40 mg  40 mg SubCUTAneous Q24H 40 mg at 04/18/18 2006    aluminum-magnesium hydroxide (MAALOX) oral suspension 30 mL  30 mL Oral Q6H PRN 30 mL at 04/16/18 0629    sodium chloride (NS) flush 5-10 mL  5-10 mL IntraVENous PRN             Case discussed with:     Arcelia Caban, DO

## 2018-04-24 NOTE — ANESTHESIA PREPROCEDURE EVALUATION
Anesthetic History   No history of anesthetic complications            Review of Systems / Medical History  Patient summary reviewed, nursing notes reviewed and pertinent labs reviewed    Pulmonary  Within defined limits                 Neuro/Psych   Within defined limits           Cardiovascular  Within defined limits  Hypertension              Exercise tolerance: >4 METS     GI/Hepatic/Renal  Within defined limits              Endo/Other  Within defined limits      Obesity     Other Findings              Physical Exam    Airway  Mallampati: II    Neck ROM: normal range of motion   Mouth opening: Normal     Cardiovascular  Regular rate and rhythm,  S1 and S2 normal,  no murmur, click, rub, or gallop  Rhythm: regular  Rate: normal         Dental  No notable dental hx       Pulmonary  Breath sounds clear to auscultation               Abdominal  GI exam deferred       Other Findings            Anesthetic Plan    ASA: 2  Anesthesia type: general          Induction: Intravenous  Anesthetic plan and risks discussed with: Patient

## 2018-04-24 NOTE — PROGRESS NOTES
Bedside shift change report given to Zaira Cruz RN (oncoming nurse) by Eddie Mckeon RN (offgoing nurse). Report included the following information SBAR, Kardex, Intake/Output, MAR, Accordion and Recent Results.

## 2018-04-24 NOTE — PROGRESS NOTES
Problem: Falls - Risk of  Goal: *Absence of Falls  Document Larry Fall Risk and appropriate interventions in the flowsheet.    Outcome: Progressing Towards Goal  Fall Risk Interventions:  Mobility Interventions: PT Consult for mobility concerns, Patient to call before getting OOB         Medication Interventions: Assess postural VS orthostatic hypotension, Patient to call before getting OOB, Teach patient to arise slowly    Elimination Interventions: Call light in reach, Patient to call for help with toileting needs

## 2018-04-24 NOTE — PROGRESS NOTES
Renal Progress Note    NAME:  Trudy Ward   :   1975   MRN:   [id number]     Date/Time:  2018      Assessment:   1. Acute Kidney Injury --> ? ATN vs ? AIN --> possibly antibiotic Rx (improving)  2. Cellulitis  3. Hyperkalemia - resolving       Plan:   1. The serum creatinine is heading in the right direction. He is no longer of Vanc and Zosyn. 2. Kayexalate was effective, if k trends back up will start [address]. 3. Avoid NSAIDs and IV contrast.  4. Hold ACE-Is, ARBs and Renin Antagonists. 5.    I &D today of right leg wound, hopefully will see further drops in creatinine with infection control         Subjective:              ---- >Mr. Vikas Quezada complained of pain in the extremities. 18  -->   He inquired about the timing of surgery. Pain seemed to be less intense this morning. 18  ----> Pain is tolerable.    awaiting surgery, no new [id number]                             Review of Systems:  Y  N       Y  N  []         []          Fever/chills                                               []         []          Chest Pain  []         []          Cough                                                       []         []          Diarrhea   []         []          Sputum                                                     []         []          Constipation  []         [x]          SOB/MILLER                                                []         [x]          Nausea/Vomit  []         []          Abd Pain                                                    []         []          Tolerating PT  []         []          Dysuria                                                      []         []          Tolerating Diet     []        Unable to obtain  ROS due to  []        mental status change  []        sedated   []        intubated    Medications reviewed:  Current Facility-Administered Medications   Medication Dose Route Frequency    oxyCODONE IR (ROXICODONE) tablet 25 mg  25 mg Oral Q4H PRN    HYDROmorphone (DILAUDID) injection 0.5 mg  0.5 mg IntraVENous Q12H PRN    meropenem (MERREM) 500 mg in 0.9% sodium chloride (MBP/ADV) 50 mL MBP  500 mg IntraVENous Q6H    clindamycin (CLEOCIN) 600mg NS 50 mL IVPB (premix)  600 mg IntraVENous Q8H    polyethylene glycol (MIRALAX) packet 17 g  17 g Oral DAILY    senna-docusate (PERICOLACE) 8.6-50 mg per tablet 1 Tab  1 Tab Oral QHS    naloxone (NARCAN) injection 0.4 mg  0.4 mg IntraVENous EVERY 2 MINUTES AS NEEDED    methadone (DOLOPHINE) tablet 110 mg  110 mg Oral DAILY    0.9% sodium chloride infusion  50 mL/hr IntraVENous CONTINUOUS    sodium chloride (NS) flush 5-10 mL  5-10 mL IntraVENous Q8H    sodium chloride (NS) flush 5-10 mL  5-10 mL IntraVENous PRN    acetaminophen (TYLENOL) tablet 650 mg  650 mg Oral Q4H PRN    prochlorperazine (COMPAZINE) injection 10 mg  10 mg IntraVENous Q6H PRN    zolpidem (AMBIEN) tablet 5 mg  5 mg Oral QHS PRN    enoxaparin (LOVENOX) injection 40 mg  40 mg SubCUTAneous Q24H    aluminum-magnesium hydroxide (MAALOX) oral suspension 30 mL  30 mL Oral Q6H PRN    sodium chloride (NS) flush 5-10 mL  5-10 mL IntraVENous PRN        Objective:   Vitals:  Visit Vitals    /81 (BP 1 Location: Right arm, BP Patient Position: Sitting)    Pulse 82    Temp 98.3 °F (36.8 °C)    Resp 18    Ht 5' 7\" (1.702 m)    Wt 98.4 kg (217 lb)    SpO2 98%    BMI 33.99 kg/m2     Temp (24hrs), Av.6 °F (37 °C), Min:98 °F (36.7 °C), Max:99.1 °F (37.3 °C)      O2 Device: Room air    Last 24hr Input/Output:    Intake/Output Summary (Last 24 hours) at 18 1032  Last data filed at 18 0934   Gross per 24 hour   Intake                0 ml   Output             2200 ml   Net            -2200 ml        PHYSICAL EXAM:    Seen in Room 536. General:    He was sitting in the chair. Head:   Normocephalic    Eyes:   No icterus    .   Lungs:   Clear to auscultation, No rales.      Heart:   No S 3  gallop. Abdomen:    Not distended. Extremities: Dressings - both legs    Psych:   Not anxious or agitated. Neuro:               Alert and oriented . []        Telemetry Reviewed     []        NSR []        PAC/PVCs   []        Afib  []        Paced   []        NSVT   []        Mckeon []        NGT  []        Intubated on vent    Lab Data Reviewed:    Recent Results (from the past 24 hour(s))   CBC WITH AUTOMATED DIFF    Collection Time: 04/24/18  4:54 AM   Result Value Ref Range    WBC 10.0 4.1 - 11.1 K/uL    RBC 4.29 4. 10 - 5.70 M/uL    HGB 11.2 (L) 12.1 - 17.0 g/dL    HCT 35.2 (L) 36.6 - 50.3 %    MCV 82.1 80.0 - 99.0 FL    MCH 26.1 26.0 - 34.0 PG    MCHC 31.8 30.0 - 36.5 g/dL    RDW 14.6 (H) 11.5 - 14.5 %    PLATELET 243 (H) 545 - 400 K/uL    MPV 8.4 (L) 8.9 - 12.9 FL    NRBC 0.0 0  WBC    ABSOLUTE NRBC 0.00 0.00 - 0.01 K/uL    NEUTROPHILS 69 32 - 75 %    BAND NEUTROPHILS 1 0 - 6 %    LYMPHOCYTES 18 12 - 49 %    MONOCYTES 8 5 - 13 %    EOSINOPHILS 2 0 - 7 %    BASOPHILS 1 0 - 1 %    MYELOCYTES 1 (H) 0 %    IMMATURE GRANULOCYTES 0 %    ABS. NEUTROPHILS 7.0 1.8 - 8.0 K/UL    ABS. LYMPHOCYTES 1.8 0.8 - 3.5 K/UL    ABS. MONOCYTES 0.8 0.0 - 1.0 K/UL    ABS. EOSINOPHILS 0.2 0.0 - 0.4 K/UL    ABS. BASOPHILS 0.1 0.0 - 0.1 K/UL    ABS. IMM.  GRANS. 0.0 K/UL    DF MANUAL      RBC COMMENTS NORMOCYTIC, NORMOCHROMIC     METABOLIC PANEL, BASIC    Collection Time: 04/24/18  4:54 AM   Result Value Ref Range    Sodium 134 (L) 136 - 145 mmol/L    Potassium 4.7 3.5 - 5.1 mmol/L    Chloride 100 97 - 108 mmol/L    CO2 28 21 - 32 mmol/L    Anion gap 6 5 - 15 mmol/L    Glucose 104 (H) 65 - 100 mg/dL    BUN 23 (H) 6 - 20 MG/DL    Creatinine 1.91 (H) 0.70 - 1.30 MG/DL    BUN/Creatinine ratio 12 12 - 20      GFR est AA 47 (L) >60 ml/min/1.73m2    GFR est non-AA 38 (L) >60 ml/min/1.73m2    Calcium 8.8 8.5 - 10.1 MG/DL       Total time spent with patient:  []        15   []        25   [] 35   []         __ minutes    []        Critical Care Provided    Care Plan discussed with:      D/W Dr. Amada Browning      [x]        Patient   []        Family    []        Care Manager   []        Consultant/Specialist :      []          >50% of visit spent in counseling and coordination of care   (Discussed []        CODE status,  []        Care Plan, []        D/C Planning)    ___________________________________________________    Attending Physician: Piero De Jesus MD

## 2018-04-24 NOTE — ANESTHESIA POSTPROCEDURE EVALUATION
Post-Anesthesia Evaluation and Assessment    Patient: Pascale Brown MRN: [id number]  SSN: [id number]    YOB: 1975  Age: 39 y.o. Sex: male       Cardiovascular Function/Vital Signs  Visit Vitals    /81    Pulse 72    Temp 36.6 °C (97.8 °F)    Resp 17    Ht 5' 7\" (1.702 m)    Wt 98.4 kg (217 lb)    SpO2 100%    BMI 33.99 kg/m2       Patient is status post general anesthesia for Procedure(s):  MECHANICAL DEBRIDEMENT BILATERAL LEG WOUNDS. Nausea/Vomiting: None    Postoperative hydration reviewed and adequate. Pain:  Pain Scale 1: Numeric (0 - 10) (04/24/18 1857)  Pain Intensity 1: 5 (04/24/18 1857)   Managed    Neurological Status:   Neuro (WDL): Within Defined Limits (04/24/18 1857)  Neuro  Neurologic State: Alert;Eyes open spontaneously (04/24/18 1857)  Orientation Level: Oriented X4 (04/24/18 1857)  Cognition: Follows commands (04/24/18 1857)  Speech: Clear (04/24/18 1857)  LUE Motor Response: Purposeful (04/24/18 1857)  LLE Motor Response: Purposeful (wiggle toes ,good plantar dorai flexion) (04/24/18 1857)  RUE Motor Response: Purposeful (04/24/18 1857)  RLE Motor Response: Purposeful (wiggle toes good plantar dorsi flexion) (04/24/18 1857)   At baseline    Mental Status and Level of Consciousness: Arousable    Pulmonary Status:   O2 Device: Room air (04/24/18 1857)   Adequate oxygenation and airway patent    Complications related to anesthesia: None    Post-anesthesia assessment completed.  No concerns    Signed By: Unk Baumgarten, MD     April 24, 2018

## 2018-04-24 NOTE — PROGRESS NOTES
Nutrition Assessment:    RECOMMENDATIONS/INTERVENTION(S):   Resume Regular diet after OR   Monitor PO intakes, wt, BG-  Last BM 4/23  Monitor skin integrity    ASSESSMENT:   4/24: Pt NPO today for I&D. OR later today. Pt had been eating well. No complaints. No GI distress. Intakes documented as >75%. , 108, 110 mg/dL. Na just low 134. Cr slowly trending down 1.91. GFR slowly trending up. Monitor PO intakes after procedure. 4/18: 39 yr old male admitted for cellulitis of legs. Consult received for diet education. Pt not interested in diet education. Pt states he eats a big breakfast and a big dinner and sometimes will eat something in between (\"like cake or whatever\"). Pt states he doesn't eat any processed foods. Pt has gained ~15-20 lbs over last 3 months, possibly fluid retention, and pt states he hasn't been able to move around as much as normal 2/2 leg pain. Pt ate >75% of lunch. Pt states the food is good but his taste buds seem a little off. He is ordering meals. Pt endorses nausea yesterday, none today. Pt had BM today (4/18). Labs: 132. , 166, 141 mg/dL. On methadone, oxycodone, dilaudid. Palliative following.     SUBJECTIVE/OBJECTIVE:   Diet Order: Regular  % Eaten:    Patient Vitals for the past 168 hrs:   % Diet Eaten   04/22/18 1232 90 %   04/22/18 0833 85 %     Pertinent Medications: [x] Reviewed    Past Medical History:   Diagnosis Date    Ill-defined condition     venous stasis ulcers    Obesity (BMI 30-39.9) 4/15/2018        Chemistries:  Lab Results   Component Value Date/Time    Sodium 134 (L) 04/24/2018 04:54 AM    Potassium 4.7 04/24/2018 04:54 AM    Chloride 100 04/24/2018 04:54 AM    CO2 28 04/24/2018 04:54 AM    Anion gap 6 04/24/2018 04:54 AM    Glucose 104 (H) 04/24/2018 04:54 AM    BUN 23 (H) 04/24/2018 04:54 AM    Creatinine 1.91 (H) 04/24/2018 04:54 AM    BUN/Creatinine ratio 12 04/24/2018 04:54 AM    GFR est AA 47 (L) 04/24/2018 04:54 AM    GFR est non-AA 38 (L) 04/24/2018 04:54 AM    Calcium 8.8 04/24/2018 04:54 AM    AST (SGOT) 21 04/15/2018 09:29 PM    Alk. phosphatase 80 04/15/2018 09:29 PM    Protein, total 7.0 04/15/2018 09:29 PM    Albumin 2.2 (L) 04/22/2018 12:38 AM    Globulin 3.7 04/15/2018 09:29 PM    A-G Ratio 0.9 (L) 04/15/2018 09:29 PM    ALT (SGPT) 21 04/15/2018 09:29 PM      Anthropometrics: Height: 5' 7\" (170.2 cm) Weight: 98.4 kg (217 lb)    IBW (%IBW): 67.1 kg (148 lb) ( ) UBW (%UBW): 95.3 kg (210 lb) (  %)    BMI: Body mass index is 33.99 kg/(m^2). This BMI is indicative of:     [] Underweight    [] Normal    [] Overweight    [x]  Obesity    []  Extreme Obesity (BMI>40)    Estimated Nutrition Needs (Based on): 2181 Kcals/day (BMR(1870x1.3)-250) , 82 g (-102g/day(0.8-1.0g/kg)) Protein  Carbohydrate: At Least 130 g/day  Fluids:2100 mL/day (1mL/kg rounded to 50 mL)    Last BM: 4/23   []Active     []Hyperactive  []Hypoactive       [] Absent   BS  Skin:    [] Intact   [] Incision  [] Breakdown   [] DTI   [] Tears/Excoriation/Abrasion  [x]Edema 2+ BLE [] Other: Wt Readings from Last 30 Encounters:   04/23/18 98.4 kg (217 lb)   01/23/18 102.1 kg (225 lb)   01/14/18 95.3 kg (210 lb)   06/25/13 104.3 kg (230 lb)      NUTRITION DIAGNOSES:   Problem:  Not ready for diet/lifestyle change     Etiology: related to uninterested in learning/applying nutition information     Signs/Symptoms: as evidenced by pt did not have any questions or concerns about current diet habits, uninterested in diet education      NUTRITION INTERVENTIONS:  Meals/Snacks: General/healthful diet   Supplements: Commercial supplement              GOAL:   Pt will consume >75% of meals with BG < 120 mg/dL.      Cultural, Caodaism, or Ethnic Dietary Needs: None     LEARNING NEEDS (Diet, Food/Nutrient-Drug Interaction):    [] None Identified   [x] Identified and Education Provided/Documented   [] Identified and Pt declined/was not appropriate      [x] Interdisciplinary Care Plan Reviewed/Documented    [x] Discharge Needs:    TBD   [] No Nutrition Related Discharge Needs    NUTRITION RISK:   Pt Is At Nutrition Risk  [x]     No Nutrition Risk Identified  []       PT SEEN FOR:    [x]  MD Consult: []Calorie Count      []Diabetic Diet Education        [x]Diet Education     []Electrolyte Management     []General Nutrition Management and Supplements     []Management of Tube Feeding     []TPN Recommendations    []  RN Referral:  []MST score >=2     []Enteral/Parenteral Nutrition PTA     []Pregnant: Gestational DM or Multigestation                 [] Pressure Ulcer      []  Low BMI      []  Length of Stay       [] Dysphagia Diet     [] Ventilator      [] Follow-Up        Previous Recommendations:   [x] Implemented          [] Not Implemented          [] Not Applicable    Previous Goal:   [x] Met              [] Progressing Towards Goal              [] Not Progressing Towards Goal   [] Not Applicable              [place], [address] [place] [address]  Pager: [phone]  Office: [phone]

## 2018-04-24 NOTE — PERIOP NOTES
TRANSFER - OUT REPORT:    Verbal report given to YARELIS Langston  being transferred to [phone] for routine post - op       Report consisted of patients Situation, Background, Assessment and   Recommendations(SBAR). Information from the following report(s) SBAR, OR Summary, Procedure Summary, Intake/Output and MAR was reviewed with the receiving nurse. Lines:   Peripheral IV 04/23/18 Left; Inner Antecubital (Active)   Site Assessment Clean, dry, & intact 4/24/2018  7:00 PM   Phlebitis Assessment 0 4/24/2018  7:00 PM   Infiltration Assessment 0 4/24/2018  7:00 PM   Dressing Status Clean, dry, & intact; Occlusive 4/24/2018  7:00 PM   Dressing Type Tape;Transparent 4/24/2018  7:00 PM   Hub Color/Line Status Blue; Infusing 4/24/2018  7:00 PM   Action Taken Open ports on tubing capped 4/24/2018  3:29 PM   Alcohol Cap Used Yes 4/24/2018  9:30 AM        Opportunity for questions and clarification was provided.       Patient transported with:   O2 @ 2 liters  Registered Nurse

## 2018-04-24 NOTE — PROGRESS NOTES
Bedside and Verbal shift change report given to YARELIS Ley (oncoming nurse) by Shonna Williamson RN (offgoing nurse). Report included the following information SBAR, Kardex, MAR, Accordion and Recent Results.

## 2018-04-25 LAB
ALBUMIN SERPL-MCNC: 2.1 G/DL (ref 3.5–5)
ANION GAP SERPL CALC-SCNC: 5 MMOL/L (ref 5–15)
BUN SERPL-MCNC: 27 MG/DL (ref 6–20)
BUN/CREAT SERPL: 15 (ref 12–20)
CALCIUM SERPL-MCNC: 8.6 MG/DL (ref 8.5–10.1)
CHLORIDE SERPL-SCNC: 102 MMOL/L (ref 97–108)
CO2 SERPL-SCNC: 26 MMOL/L (ref 21–32)
CREAT SERPL-MCNC: 1.84 MG/DL (ref 0.7–1.3)
GLUCOSE SERPL-MCNC: 138 MG/DL (ref 65–100)
PHOSPHATE SERPL-MCNC: 4 MG/DL (ref 2.6–4.7)
POTASSIUM SERPL-SCNC: 4.8 MMOL/L (ref 3.5–5.1)
SODIUM SERPL-SCNC: 133 MMOL/L (ref 136–145)

## 2018-04-25 PROCEDURE — 74011250637 HC RX REV CODE- 250/637: Performed by: INTERNAL MEDICINE

## 2018-04-25 PROCEDURE — 65270000029 HC RM PRIVATE

## 2018-04-25 PROCEDURE — 36415 COLL VENOUS BLD VENIPUNCTURE: CPT | Performed by: SURGERY

## 2018-04-25 PROCEDURE — 74011250636 HC RX REV CODE- 250/636: Performed by: INTERNAL MEDICINE

## 2018-04-25 PROCEDURE — 74011000258 HC RX REV CODE- 258: Performed by: INTERNAL MEDICINE

## 2018-04-25 PROCEDURE — 80069 RENAL FUNCTION PANEL: CPT | Performed by: SURGERY

## 2018-04-25 RX ORDER — HYDROMORPHONE HYDROCHLORIDE 2 MG/ML
1 INJECTION, SOLUTION INTRAMUSCULAR; INTRAVENOUS; SUBCUTANEOUS
Status: DISCONTINUED | OUTPATIENT
Start: 2018-04-25 | End: 2018-04-26

## 2018-04-25 RX ADMIN — HYDROMORPHONE HYDROCHLORIDE 1 MG: 2 INJECTION INTRAMUSCULAR; INTRAVENOUS; SUBCUTANEOUS at 12:00

## 2018-04-25 RX ADMIN — HYDROMORPHONE HYDROCHLORIDE 1 MG: 2 INJECTION INTRAMUSCULAR; INTRAVENOUS; SUBCUTANEOUS at 06:29

## 2018-04-25 RX ADMIN — HYDROMORPHONE HYDROCHLORIDE 1 MG: 2 INJECTION INTRAMUSCULAR; INTRAVENOUS; SUBCUTANEOUS at 16:38

## 2018-04-25 RX ADMIN — MEROPENEM 500 MG: 500 INJECTION, POWDER, FOR SOLUTION INTRAVENOUS at 20:35

## 2018-04-25 RX ADMIN — HYDROMORPHONE HYDROCHLORIDE 1 MG: 2 INJECTION INTRAMUSCULAR; INTRAVENOUS; SUBCUTANEOUS at 20:35

## 2018-04-25 RX ADMIN — OXYCODONE HYDROCHLORIDE 25 MG: 5 TABLET ORAL at 00:19

## 2018-04-25 RX ADMIN — OXYCODONE HYDROCHLORIDE 25 MG: 5 TABLET ORAL at 18:46

## 2018-04-25 RX ADMIN — OXYCODONE HYDROCHLORIDE 25 MG: 5 TABLET ORAL at 09:46

## 2018-04-25 RX ADMIN — MEROPENEM 500 MG: 500 INJECTION, POWDER, FOR SOLUTION INTRAVENOUS at 14:03

## 2018-04-25 RX ADMIN — HYDROMORPHONE HYDROCHLORIDE 1 MG: 2 INJECTION INTRAMUSCULAR; INTRAVENOUS; SUBCUTANEOUS at 02:29

## 2018-04-25 RX ADMIN — OXYCODONE HYDROCHLORIDE 25 MG: 5 TABLET ORAL at 22:51

## 2018-04-25 RX ADMIN — SODIUM CHLORIDE 50 ML/HR: 900 INJECTION, SOLUTION INTRAVENOUS at 16:38

## 2018-04-25 RX ADMIN — OXYCODONE HYDROCHLORIDE 25 MG: 5 TABLET ORAL at 04:15

## 2018-04-25 RX ADMIN — MEROPENEM 500 MG: 500 INJECTION, POWDER, FOR SOLUTION INTRAVENOUS at 02:29

## 2018-04-25 RX ADMIN — METHADONE HYDROCHLORIDE 110 MG: 10 TABLET ORAL at 09:47

## 2018-04-25 RX ADMIN — MEROPENEM 500 MG: 500 INJECTION, POWDER, FOR SOLUTION INTRAVENOUS at 09:48

## 2018-04-25 RX ADMIN — OXYCODONE HYDROCHLORIDE 25 MG: 5 TABLET ORAL at 14:02

## 2018-04-25 NOTE — PROGRESS NOTES
Bedside and Verbal shift change report given to Savannah Reina (oncoming nurse) by Nadja Cantrell (offgoing nurse). Report included the following information SBAR and Kardex.

## 2018-04-25 NOTE — PROGRESS NOTES
General Surgery Daily Progress Note    Patient: Nehemias Castle MRN: [id number]  SSN: [id number]    YOB: 1975  Age: 39 y.o. Sex: male      Admit Date: 4/15/2018    Subjective:   C/o pain    Current Facility-Administered Medications   Medication Dose Route Frequency    HYDROmorphone (DILAUDID) injection 1 mg  1 mg IntraVENous Q4H PRN    oxyCODONE IR (ROXICODONE) tablet 25 mg  25 mg Oral Q4H PRN    meropenem (MERREM) 500 mg in 0.9% sodium chloride (MBP/ADV) 50 mL MBP  500 mg IntraVENous Q6H    polyethylene glycol (MIRALAX) packet 17 g  17 g Oral DAILY    senna-docusate (PERICOLACE) 8.6-50 mg per tablet 1 Tab  1 Tab Oral QHS    naloxone (NARCAN) injection 0.4 mg  0.4 mg IntraVENous EVERY 2 MINUTES AS NEEDED    methadone (DOLOPHINE) tablet 110 mg  110 mg Oral DAILY    0.9% sodium chloride infusion  50 mL/hr IntraVENous CONTINUOUS    sodium chloride (NS) flush 5-10 mL  5-10 mL IntraVENous Q8H    sodium chloride (NS) flush 5-10 mL  5-10 mL IntraVENous PRN    acetaminophen (TYLENOL) tablet 650 mg  650 mg Oral Q4H PRN    prochlorperazine (COMPAZINE) injection 10 mg  10 mg IntraVENous Q6H PRN    zolpidem (AMBIEN) tablet 5 mg  5 mg Oral QHS PRN    enoxaparin (LOVENOX) injection 40 mg  40 mg SubCUTAneous Q24H    aluminum-magnesium hydroxide (MAALOX) oral suspension 30 mL  30 mL Oral Q6H PRN    sodium chloride (NS) flush 5-10 mL  5-10 mL IntraVENous PRN        Objective:   04/25 0701 - 04/25 1900  In: -   Out: 700 [Urine:700]  04/23 1901 - 04/25 0700  In: 1100 [P.O.:300; I.V.:800]  Out: 2150 [Urine:2150]  Patient Vitals for the past 8 hrs:   BP Temp Pulse Resp SpO2   04/25/18 0725 142/87 98 °F (36.7 °C) 62 18 98 %   04/25/18 0349 143/80 98.6 °F (37 °C) 65 16 97 %       Physical Exam:  General: Alert, cooperative, NAD  Lungs: Unlabored  Heart:  Regular rate and  rhythm  Extremities: Bilateral lower extremities w/ ace wraps in place. Edema improved.    Skin:  Warm and dry, no rash    Labs: Recent Labs 04/24/18   0454   WBC  10.0   HGB  11.2*   HCT  35.2*   PLT  492*     Recent Labs      04/25/18   0229   NA  133*   K  4.8   CL  102   CO2  26   GLU  138*   BUN  27*   CREA  1.84*   CA  8.6   PHOS  4.0   ALB  2.1*       Assessment / Plan:   · POD#1 mechanical debridement of bilateral lower legs  · Leave dressings in place today  · Diligent elevation of lower extremities.  This was emphasized to patient  · Plan for wound check tomorrow and unna boot to RLE  · Defer pain management to primary team and palliative

## 2018-04-25 NOTE — PROGRESS NOTES
0019: patient complaining of pain \"400/10. \" Administered PRN seth. 0115: went in to reassess pain and patient was sleeping.     0125: patient called out in pain. Dr. Yusuf Mckeon aware. 0128: Dr. Yusuf Mckeon changed PRN dilaudid order from Q6 to Q4.     0129: notified patient of change and that he could not receive medication for about another hour because his last dose was administered at 2233. Offered repositioning and distraction until it was time to administer. Pt got very aggressive and started screaming. Pt was told again he needed to wait to get next dose of medication. Will continue to monitor.

## 2018-04-25 NOTE — PROGRESS NOTES
Medical Progress Note      NAME: Rama Blake   :  1975  MRM:  [id number]    Date/Time: 2018  12:56 PM       Assessment / Plan:     RLE cellulitis / Chronic cutaneous venous stasis ulcer:  Wounds followed at wound care clinic. US negative for DVT on admission. Evaluated by vascular surgery this admission and recommended compression and elevation. Wound culture with heavy diphtheroids, providencia, heavy strep, beta hemolytic group C and pseudomonas. S/P I&D by gen surgery . -- continue meropenem per ID       KATHARINE:  Possibly drug related. Abx changed already. Appreciate renal eval. Trending down. Concern for ATN vs AIN from vanc or zosyn. -- monitor creatinine    -- continue IVF    Hyperkalemia:  Likely from KATHARINE. Given SPS with improvement. -- follow K   -- change to low K diet    Mild chronic anemia:  HGB stable this admission. Folate and B12 WNL. Iron profile more c/w ACD.    -- monitor HGB    Chronic pain:  On chronic methadone. Appreciate palliative care managing acute / chronic pain. Obesity (BMI 30-39. 9): Will benefit from weight loss.      Tobacco abuse:  Already advised cessation.     Microscopic hematuria:   -- repeat UA    Sepsis:  Resolved              Subjective:     Chief Complaint:  Leg pain    Pain in legs worse with surgery. No n/v. Had BM this AM.    ROS:  (bold if positive, if negative)              Objective:       Vitals:          Last 24hrs VS reviewed since prior progress note.  Most recent are:    Visit Vitals    /85 (BP 1 Location: Right arm, BP Patient Position: At rest)    Pulse 70    Temp 98 °F (36.7 °C)    Resp 18    Ht 5' 7\" (1.702 m)    Wt 98.4 kg (217 lb)    SpO2 100%    BMI 33.99 kg/m2     SpO2 Readings from Last 6 Encounters:   18 100%   18 98%   01/15/18 98%   13 93%    O2 Flow Rate (L/min): 2 l/min       Intake/Output Summary (Last 24 hours) at 18 1256  Last data filed at 18 0727   Gross per 24 hour Intake             1100 ml   Output              700 ml   Net              400 ml          Exam:     Physical Exam:    Gen:  Well-developed, well-nourished, in no acute distress  HEENT:  Pink conjunctivae, hearing intact to voice, moist mucous membranes  Resp:  No accessory muscle use, clear breath sounds without wheezes rales or rhonchi  Card:  No murmurs, normal S1, S2 without thrills  Abd:  Soft, non-tender, non-distended, normoactive bowel sounds are present  Musc:  B/l LE ace wrapped  Neuro:   Face symmetric, tongue midline, speech fluent,  strength is 5/5 bilaterally and dorsi / plantarflexion is 5/5 bilaterally, follows commands appropriately  Psych:  Good insight, alert    Medications Reviewed: (see below)    Lab Data Reviewed: (see below)    ______________________________________________________________________    Medications:     Current Facility-Administered Medications   Medication Dose Route Frequency    HYDROmorphone (DILAUDID) injection 1 mg  1 mg IntraVENous Q4H PRN    oxyCODONE IR (ROXICODONE) tablet 25 mg  25 mg Oral Q4H PRN    meropenem (MERREM) 500 mg in 0.9% sodium chloride (MBP/ADV) 50 mL MBP  500 mg IntraVENous Q6H    polyethylene glycol (MIRALAX) packet 17 g  17 g Oral DAILY    senna-docusate (PERICOLACE) 8.6-50 mg per tablet 1 Tab  1 Tab Oral QHS    naloxone (NARCAN) injection 0.4 mg  0.4 mg IntraVENous EVERY 2 MINUTES AS NEEDED    methadone (DOLOPHINE) tablet 110 mg  110 mg Oral DAILY    0.9% sodium chloride infusion  50 mL/hr IntraVENous CONTINUOUS    sodium chloride (NS) flush 5-10 mL  5-10 mL IntraVENous Q8H    sodium chloride (NS) flush 5-10 mL  5-10 mL IntraVENous PRN    acetaminophen (TYLENOL) tablet 650 mg  650 mg Oral Q4H PRN    prochlorperazine (COMPAZINE) injection 10 mg  10 mg IntraVENous Q6H PRN    zolpidem (AMBIEN) tablet 5 mg  5 mg Oral QHS PRN    enoxaparin (LOVENOX) injection 40 mg  40 mg SubCUTAneous Q24H    aluminum-magnesium hydroxide (MAALOX) oral suspension 30 mL  30 mL Oral Q6H PRN    sodium chloride (NS) flush 5-10 mL  5-10 mL IntraVENous PRN            Lab Review:     Recent Labs      04/24/18 0454 04/23/18 0316   WBC  10.0  13.3*   HGB  11.2*  11.1*   HCT  35.2*  35.6*   PLT  492*  504*     Recent Labs      04/25/18 0229 04/24/18 0454 04/23/18 0316   NA  133*  134*  136   K  4.8  4.7  5.7*   CL  102  100  100   CO2  26  28  31   GLU  138*  104*  108*   BUN  27*  23*  21*   CREA  1.84*  1.91*  1.92*   CA  8.6  8.8  9.0   PHOS  4.0   --    --    ALB  2.1*   --    --      No results found for: GLUCPOC      Total time spent with patient: [facility] [facility] discussed with: Patient    Discussed:  Care Plan    Prophylaxis:  Lovenox    Disposition:  Home w/Family           ___________________________________________________    Attending Physician: Saniya Arcos MD

## 2018-04-25 NOTE — CONSULTS
Palliative Medicine Consult  [place]: [phone] ([phone])    Patient Name: Pascale Brown  YOB: 1975    Date of Initial Consult: 04/17/2018  Reason for Consult: Overwhelming Symptoms  Requesting Provider: Gonzalez Carney MD   Primary Care Physician: None     SUMMARY:   Pascale Brown is a 39 y. o. with a past history of obesity, venous stasis, chronic pain, who was admitted on 4/15/2018 from home with a diagnosis of cellulitis of right lower extremity. Patient has history of chronic venous stasis with ulcers that [facility] was [facility] for but had began to do his own wound care when he could arrange transportation. Presented to the ED with complaints of increasing pain and foul smelling discharge for the right leg. Was admitted and started on vancomycin and zosyn. Wound cultures growing diphtheroids, GNR, and pseudomonas. Vascular surgery consulted and venous dopplers negative for DVT, and no evidence of arterial insufficiency. Recommend antibiotics, compression and elevation. Current medical issues leading to Palliative Medicine involvement include: overwhelming symptoms, acute pain related to venous stasis ulcers and cellulitis. .    SH: Single, lives alone, works as a contractor. Interim History:  4/18: Creat increased overnight to 2; antibiotics changed from vanc/zosyn to cefepime for cellulitis. Continues with pain, currently 9/10.     4/25: Post op day one from bilat lower extremity debridement. Reports pain currently at 10/10. PALLIATIVE DIAGNOSES:   1. Acute right leg pain  2. Chronic pain  3. Physical debility  4. Chronic venous stasis       PLAN:   1. Patient continues to have difficulty with pain management. 2. Prior to OR, patient's pain regimen was 25 mg oxycodone every four hours as needed, and we had tapered his IV dilaudid down to 0.5 mg every 12 hours as needed for severee pain.   3. Pain 10/10 now after surgical debridement, IV dilaudid 1 mg changed to every 4 hours overnight and he has received 4 doses post-op. States he has just asked the nurse to bring him a dose. Now with acute post op pain and reports some relief on current regimen. 4. Discussed pain being less of an issue as we get further out from surgery, and will continue IV dilaudid 1 mg every four hours as needed for breakthrough pain, with the plan to wean this down in the next 24-48 hours. Patient is in agreement of this plan. 5. Patient reports he has been on and off methadone for chronic pain for over 10 years, and currently goes [employer] daily for his methadone dose of 110 mg daily. 6. Initial consult note routed to primary continuity provider  7. Communicated plan of care with: Palliative IDT       GOALS OF CARE / TREATMENT PREFERENCES:     GOALS OF CARE: full restorative treatment in an effort to recover and return home  Patient/Health Care Proxy Stated Goals: Cure      TREATMENT PREFERENCES:   Code Status: Full Code    Advance Care Planning:  Advance Care Planning 4/16/2018   Patient's Healthcare Decision Maker is: Verbal statement (Legal Next of Kin remains as decision maker)   Primary Decision Maker Name Bard Emmanuel (friend)    Primary Decision Maker Phone Number [phone]   Primary Decision Maker Relationship to Patient Friend   Confirm Advance Directive None   Patient Would Like to Complete Advance Directive -       Medical Interventions: Full interventions   Other Instructions:   Artificially Administered Nutrition:  (not addressed)     Other:    As far as possible, the palliative care team has discussed with patient / health care proxy about goals of care / treatment preferences for patient.            HISTORY:     History obtained from: patient, chart    CHIEF COMPLAINT: right leg pain    HPI/SUBJECTIVE:    The patient is:   [x] Verbal and participatory  [] Non-participatory due to:     Patient has history of chronic venous stasis with ulcers that he was going [facility] for but had began to do his own wound care when he could arrange transportation. Presented to the ED with complaints of increasing pain and foul smelling discharge for the right leg. Was admitted and started on vancomycin and zosyn. Wound cultures growing diphtheroids, GNR, and pseudomonas. Vascular surgery consulted and venous dopplers negative for DVT, and no evidence of arterial insufficiency. Recommend antibiotics, compression and elevation. 4/18: Currently has p.o. oxycodone 15 mg every four hours as needed for pain and IV dilaudid 1 mg every four hours for severe pain/breakthrough pain. Reports that the lowest his pain scale got was 8-9/10, but does report that the oral provides better pain control than the IV.     4/25: Post op day one from bilat lower extremity debridement. Reports pain currently at 10/10.   Clinical Pain Assessment (nonverbal scale for severity on nonverbal patients):   Clinical Pain Assessment  Severity: 10  Location: right LE  Character: throbbing, burning  Duration: chronic; worse since Saturday  Effect: limited movement/weight bearing  Factors: walking, increased edema  Frequency: constant          Duration: for how long has pt been experiencing pain (e.g., 2 days, 1 month, years)  Frequency: how often pain is an issue (e.g., several times per day, once every few days, constant)     FUNCTIONAL ASSESSMENT:     Palliative Performance Scale (PPS):  PPS: 70       PSYCHOSOCIAL/SPIRITUAL SCREENING:     Palliative IDT has assessed this patient for cultural preferences / practices and a referral made as appropriate to needs (Cultural Services, Patient Advocacy, Ethics, etc.)    Advance Care Planning:  Advance Care Planning 4/16/2018   Patient's Healthcare Decision Maker is: Verbal statement (Legal Next of Kin remains as decision maker)   Primary Decision Maker Name Florina Gleason (friend)    Primary Decision Maker Phone Number [phone]   Primary Decision Maker Relationship to Patient Friend   Confirm Advance Directive None   Patient Would Like to Complete Advance Directive -       Any spiritual / Jewish concerns:  [] Yes /  [x] No    Caregiver Burnout:  [] Yes /  [] No /  [x] No Caregiver Present      Anticipatory grief assessment:   [x] Normal  / [] Maladaptive       ESAS Anxiety:      ESAS Depression:          REVIEW OF SYSTEMS:     Positive and pertinent negative findings in ROS are noted above in HPI. The following systems were [x] reviewed / [] unable to be reviewed as noted in HPI  Other findings are noted below. Systems: constitutional, ears/nose/mouth/throat, respiratory, gastrointestinal, genitourinary, musculoskeletal, integumentary, neurologic, psychiatric, endocrine. Positive findings noted below. Modified ESAS Completed by: provider     Drowsiness: 0     Pain: 10           Dyspnea: 0     Constipation: No     Stool Occurrence(s): 1        PHYSICAL EXAM:     From RN flowsheet:  Wt Readings from Last 3 Encounters:   04/23/18 217 lb (98.4 kg)   01/23/18 225 lb (102.1 kg)   01/14/18 210 lb (95.3 kg)     Blood pressure 148/85, pulse 70, temperature 98 °F (36.7 °C), resp. rate 18, height 5' 7\" (1.702 m), weight 217 lb (98.4 kg), SpO2 100 %. Pain Scale 1: Numeric (0 - 10)  Pain Intensity 1: 10  Pain Onset 1: constant  Pain Location 1: Leg (BLE's)  Pain Orientation 1: Left, Right  Pain Description 1: Burning  Pain Intervention(s) 1: Medication (see MAR)  Last bowel movement, if known:     Constitutional: Uncomfortable appearing, NAD, laying in bed  Eyes: pupils equal, anicteric  ENMT: no nasal discharge, moist mucous membranes  Cardiovascular: regular rhythm, +2 pitting COLLEEN in right foot, +1 pitting in left foot; remainder of lower legs wrapped in kerlex up to the knee. Dressings c/d/i.    Respiratory: breathing not labored, symmetric  Gastrointestinal: soft non-tender, +bowel sounds  Musculoskeletal: no deformity, no tenderness to palpation  Skin: warm, dry  Neurologic: following commands, moving all extremities  Psychiatric: full affect, no hallucinations  Other:       HISTORY:     Principal Problem:    Cellulitis (4/15/2018)    Active Problems:    Open leg wound (1/14/2018)      Chronic cutaneous venous stasis ulcer ([facility] [address] (1/14/2018)      HTN (hypertension) (1/14/2018)      Chronic pain (1/14/2018)      Obesity (BMI 30-39.9) (4/15/2018)      Hyperglycemia (4/15/2018)      Sepsis ([facility] [address] (4/15/2018)      Tobacco abuse (4/15/2018)      Leukocytosis (4/16/2018)      Anemia (4/16/2018)      Hypomagnesemia (4/16/2018)      Hypophosphatemia (4/16/2018)      Past Medical History:   Diagnosis Date    Ill-defined condition     venous stasis ulcers    Obesity (BMI 30-39.9) 4/15/2018      Past Surgical History:   Procedure Laterality Date    HX CORONARY STENT PLACEMENT        Family History   Problem Relation Age of Onset    Family history unknown: Yes      History reviewed, no pertinent family history.   Social History   Substance Use Topics    Smoking status: Current Every Day Smoker     Packs/day: 1.00    Smokeless tobacco: Never Used    Alcohol use No     Allergies   Allergen Reactions    Tylenol [Acetaminophen] Other (comments)     abd pain         Current Facility-Administered Medications   Medication Dose Route Frequency    HYDROmorphone (DILAUDID) injection 1 mg  1 mg IntraVENous Q4H PRN    oxyCODONE IR (ROXICODONE) tablet 25 mg  25 mg Oral Q4H PRN    meropenem (MERREM) 500 mg in 0.9% sodium chloride (MBP/ADV) 50 mL MBP  500 mg IntraVENous Q6H    polyethylene glycol (MIRALAX) packet 17 g  17 g Oral DAILY    senna-docusate (PERICOLACE) 8.6-50 mg per tablet 1 Tab  1 Tab Oral QHS    naloxone (NARCAN) injection 0.4 mg  0.4 mg IntraVENous EVERY 2 MINUTES AS NEEDED    methadone (DOLOPHINE) tablet 110 mg  110 mg Oral DAILY    0.9% sodium chloride infusion  50 mL/hr IntraVENous CONTINUOUS    sodium chloride (NS) flush 5-10 mL  5-10 mL IntraVENous Q8H    sodium chloride (NS) flush 5-10 mL  5-10 mL IntraVENous PRN    acetaminophen (TYLENOL) tablet 650 mg  650 mg Oral Q4H PRN    prochlorperazine (COMPAZINE) injection 10 mg  10 mg IntraVENous Q6H PRN    zolpidem (AMBIEN) tablet 5 mg  5 mg Oral QHS PRN    enoxaparin (LOVENOX) injection 40 mg  40 mg SubCUTAneous Q24H    aluminum-magnesium hydroxide (MAALOX) oral suspension 30 mL  30 mL Oral Q6H PRN    sodium chloride (NS) flush 5-10 mL  5-10 mL IntraVENous PRN          LAB AND IMAGING FINDINGS:     Lab Results   Component Value Date/Time    WBC 10.0 04/24/2018 04:54 AM    HGB 11.2 (L) 04/24/2018 04:54 AM    PLATELET 038 (H) 86/12/9394 04:54 AM     Lab Results   Component Value Date/Time    Sodium 133 (L) 04/25/2018 02:29 AM    Potassium 4.8 04/25/2018 02:29 AM    Chloride 102 04/25/2018 02:29 AM    CO2 26 04/25/2018 02:29 AM    BUN 27 (H) 04/25/2018 02:29 AM    Creatinine 1.84 (H) 04/25/2018 02:29 AM    Calcium 8.6 04/25/2018 02:29 AM    Magnesium 1.9 04/17/2018 02:44 AM    Phosphorus 4.0 04/25/2018 02:29 AM      Lab Results   Component Value Date/Time    AST (SGOT) 21 04/15/2018 09:29 PM    Alk. phosphatase 80 04/15/2018 09:29 PM    Protein, total 7.0 04/15/2018 09:29 PM    Albumin 2.1 (L) 04/25/2018 02:29 AM    Globulin 3.7 04/15/2018 09:29 PM     No results found for: INR, PTMR, PTP, PT1, PT2, APTT   Lab Results   Component Value Date/Time    Iron 9 (L) 04/17/2018 02:44 AM    TIBC 219 (L) 04/17/2018 02:44 AM    Iron % saturation 4 (L) 04/17/2018 02:44 AM    Ferritin 285 04/17/2018 02:44 AM      No results found for: PH, PCO2, PO2  No components found for: GLPOC   No results found for: CPK, CKMB             Total time:   Counseling / coordination time, spent as noted above:  > 50% counseling / coordination?:    Prolonged service was provided for  []30 min   []75 min in face to face time in the presence of the patient, spent as noted above.   Time Start:   Time End:   Note: this can only be billed with 83506 (initial) or 97154 (follow up). If multiple start / stop times, list each separately.

## 2018-04-25 NOTE — PROGRESS NOTES
Pt is requesting pain med every 2 hours scheduled, alternating Dilaudid IV w/ Roxycodone po; discussed with Merrick Angeles; will continue current med regimen of Dilaudid IV every 4 hours and Mini po every 4 hours prn;  Discussed with patient that pain meds are prn and RN will assess pain every 2-4 hours.

## 2018-04-25 NOTE — PROGRESS NOTES
Follow up visit with Mr. Newman on 5 Med Surg. He shared he is in some discomfort, he explained the procedure he recently had on his feet. He shared he is taking one day at a time right now and is hopeful healing will occur. He shared he has not had very many visitors and he is ok with that. He asked for prayer for healing for his feet. Provided spiritual presence and prayer. Advised of  Availability.   Visited by: Renold Dakin [id number] [facility] Ravinder Lowery ([id number])

## 2018-04-25 NOTE — MED STUDENT NOTES
*ATTENTION:  This note has been created by a medical student for educational purposes only. Please do not refer to the content of this note for clinical decision-making, billing, or other purposes. Please see attending physicians note to obtain clinical information on this patient. *     General Surgery Daily Progress Note    Patient: Patricia Romo MRN: [id number]  SSN: [id number]    YOB: 1975  Age: 39 y.o. Sex: male      Admit Date: 4/15/2018    Subjective:   Appeared agitated, stating he had severe lower extremity pain this morning. No nausea. Current Facility-Administered Medications   Medication Dose Route Frequency    HYDROmorphone (DILAUDID) injection 1 mg  1 mg IntraVENous Q4H PRN    oxyCODONE IR (ROXICODONE) tablet 25 mg  25 mg Oral Q4H PRN    meropenem (MERREM) 500 mg in 0.9% sodium chloride (MBP/ADV) 50 mL MBP  500 mg IntraVENous Q6H    polyethylene glycol (MIRALAX) packet 17 g  17 g Oral DAILY    senna-docusate (PERICOLACE) 8.6-50 mg per tablet 1 Tab  1 Tab Oral QHS    naloxone (NARCAN) injection 0.4 mg  0.4 mg IntraVENous EVERY 2 MINUTES AS NEEDED    methadone (DOLOPHINE) tablet 110 mg  110 mg Oral DAILY    0.9% sodium chloride infusion  50 mL/hr IntraVENous CONTINUOUS    sodium chloride (NS) flush 5-10 mL  5-10 mL IntraVENous Q8H    sodium chloride (NS) flush 5-10 mL  5-10 mL IntraVENous PRN    acetaminophen (TYLENOL) tablet 650 mg  650 mg Oral Q4H PRN    prochlorperazine (COMPAZINE) injection 10 mg  10 mg IntraVENous Q6H PRN    zolpidem (AMBIEN) tablet 5 mg  5 mg Oral QHS PRN    enoxaparin (LOVENOX) injection 40 mg  40 mg SubCUTAneous Q24H    aluminum-magnesium hydroxide (MAALOX) oral suspension 30 mL  30 mL Oral Q6H PRN    sodium chloride (NS) flush 5-10 mL  5-10 mL IntraVENous PRN        Objective:   04/25 0701 - 04/25 1900  In: -   Out: 700 [Urine:700]  04/23 1901 - 04/25 0700  In: 1100 [P.O.:300;  I.V.:800]  Out: 2150 [Urine:2150]  Patient Vitals for the past 8 hrs:   BP Temp Pulse Resp SpO2   04/25/18 0725 142/87 98 °F (36.7 °C) 62 18 98 %   04/25/18 0349 143/80 98.6 °F (37 °C) 65 16 97 %       Physical Exam:  General: Alert, agitated, NAD  Lungs: Unlabored  Heart:  Regular rate and  rhythm  Abdomen: Soft, nontender, nondistended. + bowel sounds. Extremities: RLE calf wound with bilateral lower extremity dressings in place, 2 second capillary refill in toes, no paresthesia  Skin:  Chronic venous stasis ulcers in the bilateral lower extremities    Labs: Recent Labs      04/24/18   0454   WBC  10.0   HGB  11.2*   HCT  35.2*   PLT  492*     Recent Labs      04/25/18   0229   NA  133*   K  4.8   CL  102   CO2  26   GLU  138*   BUN  27*   CREA  1.84*   CA  8.6   PHOS  4.0   ALB  2.1*       Assessment / Plan:   · RLE Chronic Venous Stasis Ulcer--POD #1 Mechanical Debridement Bilateral Leg Wounds  · Continue current pain management with Dilaudid and Roxicodone  · Antibiotics management per primary team  · Change dressings on daily basis and follow up with wound care clinic on outpatient basis. · Consult wound care nurse.

## 2018-04-26 ENCOUNTER — ANESTHESIA (OUTPATIENT)
Dept: SURGERY | Age: 43
DRG: 853 | End: 2018-04-26
Payer: SELF-PAY

## 2018-04-26 ENCOUNTER — ANESTHESIA EVENT (OUTPATIENT)
Dept: SURGERY | Age: 43
DRG: 853 | End: 2018-04-26
Payer: SELF-PAY

## 2018-04-26 LAB
ALBUMIN SERPL-MCNC: 2.2 G/DL (ref 3.5–5)
ANION GAP SERPL CALC-SCNC: 5 MMOL/L (ref 5–15)
APPEARANCE UR: CLEAR
BACTERIA URNS QL MICRO: NEGATIVE /HPF
BILIRUB UR QL: NEGATIVE
BUN SERPL-MCNC: 27 MG/DL (ref 6–20)
BUN/CREAT SERPL: 16 (ref 12–20)
CALCIUM SERPL-MCNC: 8.8 MG/DL (ref 8.5–10.1)
CHLORIDE SERPL-SCNC: 102 MMOL/L (ref 97–108)
CO2 SERPL-SCNC: 27 MMOL/L (ref 21–32)
COLOR UR: NORMAL
CREAT SERPL-MCNC: 1.66 MG/DL (ref 0.7–1.3)
EPITH CASTS URNS QL MICRO: NORMAL /LPF
GLUCOSE SERPL-MCNC: 130 MG/DL (ref 65–100)
GLUCOSE UR STRIP.AUTO-MCNC: NEGATIVE MG/DL
HGB UR QL STRIP: NEGATIVE
HYALINE CASTS URNS QL MICRO: NORMAL /LPF (ref 0–5)
KETONES UR QL STRIP.AUTO: NEGATIVE MG/DL
LEUKOCYTE ESTERASE UR QL STRIP.AUTO: NEGATIVE
NITRITE UR QL STRIP.AUTO: NEGATIVE
PH UR STRIP: 7 [PH] (ref 5–8)
PHOSPHATE SERPL-MCNC: 4.6 MG/DL (ref 2.6–4.7)
POTASSIUM SERPL-SCNC: 4.4 MMOL/L (ref 3.5–5.1)
PROT UR STRIP-MCNC: NEGATIVE MG/DL
RBC #/AREA URNS HPF: NORMAL /HPF (ref 0–5)
SODIUM SERPL-SCNC: 134 MMOL/L (ref 136–145)
SP GR UR REFRACTOMETRY: 1.01 (ref 1–1.03)
UA: UC IF INDICATED,UAUC: NORMAL
UROBILINOGEN UR QL STRIP.AUTO: 0.2 EU/DL (ref 0.2–1)
WBC URNS QL MICRO: NORMAL /HPF (ref 0–4)

## 2018-04-26 PROCEDURE — 77030020782 HC GWN BAIR PAWS FLX 3M -B

## 2018-04-26 PROCEDURE — 77030018836 HC SOL IRR NACL ICUM -A: Performed by: SURGERY

## 2018-04-26 PROCEDURE — 0JDN0ZZ EXTRACTION OF RIGHT LOWER LEG SUBCUTANEOUS TISSUE AND FASCIA, OPEN APPROACH: ICD-10-PCS | Performed by: SURGERY

## 2018-04-26 PROCEDURE — 74011000258 HC RX REV CODE- 258: Performed by: INTERNAL MEDICINE

## 2018-04-26 PROCEDURE — 74011250637 HC RX REV CODE- 250/637: Performed by: INTERNAL MEDICINE

## 2018-04-26 PROCEDURE — 65270000029 HC RM PRIVATE

## 2018-04-26 PROCEDURE — 74011250637 HC RX REV CODE- 250/637: Performed by: FAMILY MEDICINE

## 2018-04-26 PROCEDURE — 81001 URINALYSIS AUTO W/SCOPE: CPT | Performed by: INTERNAL MEDICINE

## 2018-04-26 PROCEDURE — 74011250636 HC RX REV CODE- 250/636: Performed by: INTERNAL MEDICINE

## 2018-04-26 PROCEDURE — 0JDP0ZZ EXTRACTION OF LEFT LOWER LEG SUBCUTANEOUS TISSUE AND FASCIA, OPEN APPROACH: ICD-10-PCS | Performed by: SURGERY

## 2018-04-26 PROCEDURE — 74011250637 HC RX REV CODE- 250/637: Performed by: SURGERY

## 2018-04-26 PROCEDURE — 74011250636 HC RX REV CODE- 250/636: Performed by: ANESTHESIOLOGY

## 2018-04-26 PROCEDURE — 76010000138 HC OR TIME 0.5 TO 1 HR: Performed by: SURGERY

## 2018-04-26 PROCEDURE — 80069 RENAL FUNCTION PANEL: CPT | Performed by: INTERNAL MEDICINE

## 2018-04-26 PROCEDURE — 74011250636 HC RX REV CODE- 250/636

## 2018-04-26 PROCEDURE — 77030037877 HC DRSG MEPILEX >48IN BORD MOLN -A

## 2018-04-26 PROCEDURE — 74011000250 HC RX REV CODE- 250

## 2018-04-26 PROCEDURE — 77030022298 HC DRSG ANTIMIC ACTICT S&N -A

## 2018-04-26 PROCEDURE — 74011250636 HC RX REV CODE- 250/636: Performed by: FAMILY MEDICINE

## 2018-04-26 PROCEDURE — 76210000017 HC OR PH I REC 1.5 TO 2 HR: Performed by: SURGERY

## 2018-04-26 PROCEDURE — 76060000032 HC ANESTHESIA 0.5 TO 1 HR: Performed by: SURGERY

## 2018-04-26 PROCEDURE — 36415 COLL VENOUS BLD VENIPUNCTURE: CPT | Performed by: INTERNAL MEDICINE

## 2018-04-26 RX ORDER — ONDANSETRON 2 MG/ML
4 INJECTION INTRAMUSCULAR; INTRAVENOUS AS NEEDED
Status: DISCONTINUED | OUTPATIENT
Start: 2018-04-26 | End: 2018-04-26 | Stop reason: HOSPADM

## 2018-04-26 RX ORDER — HYDROMORPHONE HYDROCHLORIDE 2 MG/ML
2 INJECTION, SOLUTION INTRAMUSCULAR; INTRAVENOUS; SUBCUTANEOUS
Status: DISCONTINUED | OUTPATIENT
Start: 2018-04-26 | End: 2018-04-28

## 2018-04-26 RX ORDER — MIDAZOLAM HYDROCHLORIDE 1 MG/ML
INJECTION, SOLUTION INTRAMUSCULAR; INTRAVENOUS AS NEEDED
Status: DISCONTINUED | OUTPATIENT
Start: 2018-04-26 | End: 2018-04-26 | Stop reason: HOSPADM

## 2018-04-26 RX ORDER — LIDOCAINE HYDROCHLORIDE 20 MG/ML
INJECTION, SOLUTION EPIDURAL; INFILTRATION; INTRACAUDAL; PERINEURAL AS NEEDED
Status: DISCONTINUED | OUTPATIENT
Start: 2018-04-26 | End: 2018-04-26 | Stop reason: HOSPADM

## 2018-04-26 RX ORDER — ONDANSETRON 2 MG/ML
INJECTION INTRAMUSCULAR; INTRAVENOUS AS NEEDED
Status: DISCONTINUED | OUTPATIENT
Start: 2018-04-26 | End: 2018-04-26 | Stop reason: HOSPADM

## 2018-04-26 RX ORDER — DIPHENHYDRAMINE HYDROCHLORIDE 50 MG/ML
12.5 INJECTION, SOLUTION INTRAMUSCULAR; INTRAVENOUS AS NEEDED
Status: DISCONTINUED | OUTPATIENT
Start: 2018-04-26 | End: 2018-04-26 | Stop reason: HOSPADM

## 2018-04-26 RX ORDER — ALBUTEROL SULFATE 0.83 MG/ML
2.5 SOLUTION RESPIRATORY (INHALATION) AS NEEDED
Status: DISCONTINUED | OUTPATIENT
Start: 2018-04-26 | End: 2018-04-26 | Stop reason: HOSPADM

## 2018-04-26 RX ORDER — HYDROMORPHONE HYDROCHLORIDE 2 MG/ML
1 INJECTION, SOLUTION INTRAMUSCULAR; INTRAVENOUS; SUBCUTANEOUS
Status: DISCONTINUED | OUTPATIENT
Start: 2018-04-26 | End: 2018-04-26 | Stop reason: HOSPADM

## 2018-04-26 RX ORDER — PROCHLORPERAZINE MALEATE 5 MG
10 TABLET ORAL
Status: DISCONTINUED | OUTPATIENT
Start: 2018-04-26 | End: 2018-04-30 | Stop reason: HOSPADM

## 2018-04-26 RX ORDER — LIDOCAINE HYDROCHLORIDE 10 MG/ML
0.1 INJECTION, SOLUTION EPIDURAL; INFILTRATION; INTRACAUDAL; PERINEURAL AS NEEDED
Status: DISCONTINUED | OUTPATIENT
Start: 2018-04-26 | End: 2018-04-26 | Stop reason: HOSPADM

## 2018-04-26 RX ORDER — PROPOFOL 10 MG/ML
INJECTION, EMULSION INTRAVENOUS
Status: DISCONTINUED | OUTPATIENT
Start: 2018-04-26 | End: 2018-04-26 | Stop reason: HOSPADM

## 2018-04-26 RX ORDER — SODIUM CHLORIDE, SODIUM LACTATE, POTASSIUM CHLORIDE, CALCIUM CHLORIDE 600; 310; 30; 20 MG/100ML; MG/100ML; MG/100ML; MG/100ML
150 INJECTION, SOLUTION INTRAVENOUS CONTINUOUS
Status: DISCONTINUED | OUTPATIENT
Start: 2018-04-26 | End: 2018-04-26 | Stop reason: HOSPADM

## 2018-04-26 RX ORDER — SODIUM CHLORIDE, SODIUM LACTATE, POTASSIUM CHLORIDE, CALCIUM CHLORIDE 600; 310; 30; 20 MG/100ML; MG/100ML; MG/100ML; MG/100ML
125 INJECTION, SOLUTION INTRAVENOUS CONTINUOUS
Status: DISCONTINUED | OUTPATIENT
Start: 2018-04-26 | End: 2018-04-26 | Stop reason: HOSPADM

## 2018-04-26 RX ORDER — SODIUM CHLORIDE, SODIUM LACTATE, POTASSIUM CHLORIDE, CALCIUM CHLORIDE 600; 310; 30; 20 MG/100ML; MG/100ML; MG/100ML; MG/100ML
INJECTION, SOLUTION INTRAVENOUS
Status: DISCONTINUED | OUTPATIENT
Start: 2018-04-26 | End: 2018-04-26 | Stop reason: HOSPADM

## 2018-04-26 RX ORDER — FENTANYL CITRATE 50 UG/ML
INJECTION, SOLUTION INTRAMUSCULAR; INTRAVENOUS AS NEEDED
Status: DISCONTINUED | OUTPATIENT
Start: 2018-04-26 | End: 2018-04-26 | Stop reason: HOSPADM

## 2018-04-26 RX ORDER — DIAZEPAM 5 MG/1
5 TABLET ORAL ONCE
Status: COMPLETED | OUTPATIENT
Start: 2018-04-26 | End: 2018-04-26

## 2018-04-26 RX ORDER — OXYCODONE HYDROCHLORIDE 5 MG/1
30 TABLET ORAL
Status: DISCONTINUED | OUTPATIENT
Start: 2018-04-26 | End: 2018-04-30 | Stop reason: HOSPADM

## 2018-04-26 RX ADMIN — OXYCODONE HYDROCHLORIDE 25 MG: 5 TABLET ORAL at 03:14

## 2018-04-26 RX ADMIN — FENTANYL CITRATE 100 MCG: 50 INJECTION, SOLUTION INTRAMUSCULAR; INTRAVENOUS at 12:15

## 2018-04-26 RX ADMIN — MEROPENEM 500 MG: 500 INJECTION, POWDER, FOR SOLUTION INTRAVENOUS at 08:41

## 2018-04-26 RX ADMIN — SODIUM CHLORIDE 50 ML/HR: 900 INJECTION, SOLUTION INTRAVENOUS at 14:14

## 2018-04-26 RX ADMIN — MIDAZOLAM HYDROCHLORIDE 2 MG: 1 INJECTION, SOLUTION INTRAMUSCULAR; INTRAVENOUS at 12:06

## 2018-04-26 RX ADMIN — HYDROMORPHONE HYDROCHLORIDE 1 MG: 2 INJECTION INTRAMUSCULAR; INTRAVENOUS; SUBCUTANEOUS at 13:33

## 2018-04-26 RX ADMIN — HYDROMORPHONE HYDROCHLORIDE 2 MG: 2 INJECTION INTRAMUSCULAR; INTRAVENOUS; SUBCUTANEOUS at 21:24

## 2018-04-26 RX ADMIN — HYDROMORPHONE HYDROCHLORIDE 1 MG: 2 INJECTION INTRAMUSCULAR; INTRAVENOUS; SUBCUTANEOUS at 14:13

## 2018-04-26 RX ADMIN — HYDROMORPHONE HYDROCHLORIDE 1 MG: 2 INJECTION INTRAMUSCULAR; INTRAVENOUS; SUBCUTANEOUS at 05:21

## 2018-04-26 RX ADMIN — HYDROMORPHONE HYDROCHLORIDE 2 MG: 2 INJECTION INTRAMUSCULAR; INTRAVENOUS; SUBCUTANEOUS at 17:09

## 2018-04-26 RX ADMIN — LIDOCAINE HYDROCHLORIDE 40 MG: 20 INJECTION, SOLUTION EPIDURAL; INFILTRATION; INTRACAUDAL; PERINEURAL at 12:13

## 2018-04-26 RX ADMIN — OXYCODONE HYDROCHLORIDE 25 MG: 5 TABLET ORAL at 15:27

## 2018-04-26 RX ADMIN — PROPOFOL 100 MCG/KG/MIN: 10 INJECTION, EMULSION INTRAVENOUS at 12:13

## 2018-04-26 RX ADMIN — HYDROMORPHONE HYDROCHLORIDE 1 MG: 2 INJECTION INTRAMUSCULAR; INTRAVENOUS; SUBCUTANEOUS at 13:41

## 2018-04-26 RX ADMIN — Medication 10 ML: at 22:00

## 2018-04-26 RX ADMIN — MEROPENEM 500 MG: 500 INJECTION, POWDER, FOR SOLUTION INTRAVENOUS at 21:26

## 2018-04-26 RX ADMIN — SODIUM CHLORIDE, SODIUM LACTATE, POTASSIUM CHLORIDE, CALCIUM CHLORIDE: 600; 310; 30; 20 INJECTION, SOLUTION INTRAVENOUS at 11:45

## 2018-04-26 RX ADMIN — MEROPENEM 500 MG: 500 INJECTION, POWDER, FOR SOLUTION INTRAVENOUS at 17:10

## 2018-04-26 RX ADMIN — METHADONE HYDROCHLORIDE 110 MG: 10 TABLET ORAL at 08:40

## 2018-04-26 RX ADMIN — MEROPENEM 500 MG: 500 INJECTION, POWDER, FOR SOLUTION INTRAVENOUS at 03:15

## 2018-04-26 RX ADMIN — Medication 10 ML: at 15:28

## 2018-04-26 RX ADMIN — ONDANSETRON 4 MG: 2 INJECTION INTRAMUSCULAR; INTRAVENOUS at 12:16

## 2018-04-26 RX ADMIN — OXYCODONE HYDROCHLORIDE 25 MG: 5 TABLET ORAL at 08:40

## 2018-04-26 RX ADMIN — HYDROMORPHONE HYDROCHLORIDE 0.5 MG: 2 INJECTION INTRAMUSCULAR; INTRAVENOUS; SUBCUTANEOUS at 14:00

## 2018-04-26 RX ADMIN — HYDROMORPHONE HYDROCHLORIDE 0.5 MG: 2 INJECTION INTRAMUSCULAR; INTRAVENOUS; SUBCUTANEOUS at 13:51

## 2018-04-26 RX ADMIN — FENTANYL CITRATE 50 MCG: 50 INJECTION, SOLUTION INTRAMUSCULAR; INTRAVENOUS at 12:35

## 2018-04-26 RX ADMIN — DIAZEPAM 5 MG: 5 TABLET ORAL at 18:22

## 2018-04-26 RX ADMIN — HYDROMORPHONE HYDROCHLORIDE 1 MG: 2 INJECTION INTRAMUSCULAR; INTRAVENOUS; SUBCUTANEOUS at 00:55

## 2018-04-26 RX ADMIN — OXYCODONE HYDROCHLORIDE 30 MG: 5 TABLET ORAL at 19:54

## 2018-04-26 RX ADMIN — MIDAZOLAM HYDROCHLORIDE 3 MG: 1 INJECTION, SOLUTION INTRAMUSCULAR; INTRAVENOUS at 12:03

## 2018-04-26 NOTE — PERIOP NOTES
TRANSFER - OUT REPORT:    Verbal report given to Tang Echevarria RN on Olga Jackson  being transferred to  for routine post - op       Report consisted of patients Situation, Background, Assessment and   Recommendations(SBAR). Information from the following report(s) SBAR, Kardex, OR Summary and MAR was reviewed with the receiving nurse. Lines:   Peripheral IV 04/26/18 Right Forearm (Active)   Site Assessment Clean, dry, & intact 4/26/2018  1:19 PM   Phlebitis Assessment 0 4/26/2018  1:19 PM   Infiltration Assessment 0 4/26/2018  1:19 PM   Dressing Status Clean, dry, & intact 4/26/2018  1:19 PM   Dressing Type Transparent 4/26/2018  1:19 PM   Hub Color/Line Status Capped 4/26/2018  1:54 PM   Action Taken Open ports on tubing capped 4/26/2018  3:57 AM   Alcohol Cap Used Yes 4/26/2018 10:34 AM        Opportunity for questions and clarification was provided.       Patient transported with:   Registered Nurse     No family available in waiting area

## 2018-04-26 NOTE — BRIEF OP NOTE
BRIEF OPERATIVE NOTE    Date of Procedure: 4/26/2018   Preoperative Diagnosis: Leg Wound   Postoperative Diagnosis: Leg Wound     Procedure(s):  MECHANICAL DEBRIDEMENT BILATERAL LOWER EXTERMITIES, Dermis and Subcutaneous tissue    PLACEMENT OF BILATERAL UNNA BOOT  Surgeon(s) and Role:     * Fiona Murphy MD - Primary         Surgical Assistant: Elian Cheema    Surgical Staff:  Circ-1: Dorothy Spann RN; Tyree Morley RN  Scrub Tech-1: Dank Vargas; Minerva Colindres  Surg Asst-1: Elian Cheema  Float Staff: Vinod Damico RN  Event Time In   Incision Start [phone]   Incision Close [phone]     Anesthesia: MAC   Estimated Blood Loss: Min  Specimens: * No specimens in log *   Findings: Full thickness partial skin loss bilateral lower extremities   Complications: none  Implants: * No implants in log *    Dictate  [id number]

## 2018-04-26 NOTE — CDMP QUERY
Please clarify if this patient is (was) being treated/managed for:     => Excisional debridement of the right lower leg venous ulcers down to  subcutaneous tissue   => Other explanation of clinical findings   => Clinically Undetermined (no explanation for clinical findings)    The medical record reflects the following clinical findings, treatment, and risk factors. Risk Factors:  38 yo M admitted on 4/15 with sepsis & cellulitis 2/2 infected leg ulcers   Clinical Indicators:  4/24 underwent \"mechanical debridement of bilateral leg wounds\"  \" Full thickness lateral, medial wounds with breakdown to subcutaneous tissue\"     Please include in your op report  :-  The technique used (e.g., excisional, excised, cutting, etc)  - The instrument(s) used (e.g., scapel, curette, etc.)  - The nature of the tissue removed (e.g., necrotic, devitalized tissues, non-viable tissue, etc)  -  The appearance and size of the wound (e.g., down to fresh bleeding tissue, 7cm x 10cm, etc)  - The depth of the debridement* (e.g., skin, subcutaneous tissue, fascia, muscle, bone, etc.)    Please clarify and document your clinical opinion in the progress notes and discharge summary including the definitive and/or presumptive diagnosis, (suspected or probable), related to the above clinical findings. Please include clinical findings supporting your diagnosis.     Thank you for your time   [facility] RN/BSN, [address]  Desk:   [phone]   Other:  [phone]

## 2018-04-26 NOTE — ANESTHESIA POSTPROCEDURE EVALUATION
Post-Anesthesia Evaluation and Assessment    Patient: Haydee Edge MRN: [id number]  SSN: [id number]    YOB: 1975  Age: 39 y.o. Sex: male       Cardiovascular Function/Vital Signs  Visit Vitals    /77    Pulse 78    Temp 36.9 °C (98.5 °F)    Resp 11    Ht 5' 7\" (1.702 m)    Wt 98.4 kg (217 lb)    SpO2 99%    BMI 33.99 kg/m2       Patient is status post MAC anesthesia for Procedure(s):  MECHANICAL DEBRIDEMENT BILATERAL LOWER EXTERMITIES PLACEMENT OF BILATERAL [place] Sine. Nausea/Vomiting: None    Postoperative hydration reviewed and adequate. Pain:  Pain Scale 1: Numeric (0 - 10) (04/26/18 1444)  Pain Intensity 1: 9 (04/26/18 1444)   Managed    Neurological Status:   Neuro (WDL): Exceptions to WDL (04/26/18 1400)  Neuro  Neurologic State: Alert;Eyes open spontaneously (04/26/18 1400)  Orientation Level: Oriented to person;Oriented to place;Oriented to situation (04/26/18 1400)  Cognition: Follows commands (04/26/18 1400)  Speech: Clear (04/26/18 1400)  LUE Motor Response: Purposeful;Spontaneous  (04/26/18 1400)  LLE Motor Response: Purposeful;Spontaneous ;Weak (04/26/18 1400)  RUE Motor Response: Purposeful;Spontaneous  (04/26/18 1400)  RLE Motor Response: Purposeful;Spontaneous ;Weak (04/26/18 1400)   At baseline    Mental Status and Level of Consciousness: Arousable    Pulmonary Status:   O2 Device: Room air (04/26/18 1444)   Adequate oxygenation and airway patent    Complications related to anesthesia: None    Post-anesthesia assessment completed.  No concerns    Signed By: Ariadne Sweeney MD     April 26, 2018

## 2018-04-26 NOTE — PROGRESS NOTES
1800-Pt continues to yell out, \"I can't stand this, I need to see a doctor\"; Called Dr Marjorie Gutierrez and informed him of   Pt's level of pain as well as pt's request to see a physician. Order received to give Valium 5mg po and remove dressings. Attempted to remove dressing and pt yelled to stop d/t increase in pain. Called Dr Srikanth Campos and discussed  Pt's pain/complaints and attempt to remove dressings. No new orders received; will continue to give Dilaudid IV  Alternating with Roxicodone po.

## 2018-04-26 NOTE — PROGRESS NOTES
Called by bedside nurse secondary to pain issues after debridement that was done in the OR. Patient being followed by our team and on chronic methadone. Patient received dilaudid 4 mg IV at 1400 and then oxycodone 25 mg at 1500 and per bedside nurse, still with pain/crying. Will increase his prn IV dilaudid to 2 mg(from 1 mg) IV every 4 hours prn and increase his oxycodone to 30 mg po every 4 hours prn pain. Continue maintenance methadone.

## 2018-04-26 NOTE — PERIOP NOTES
Pt cont to c/o burning to BLE. Given dilaudid 4mg IVP, pt voided 750mls clear yellow urine.   Discussed with anes, pt will cont PO meds as prescribed

## 2018-04-26 NOTE — PROGRESS NOTES
Pt yelling out with c/o pain; \"my legs are on fire, they are so painful\"; called Dr Román Garrido re pain management;  Orders received to increase Dilaudid and Roxicodone po; Dilaudid 2mg given IV.

## 2018-04-26 NOTE — PROGRESS NOTES
Medical Progress Note      NAME: Tomasa Jean   :  1975  MRM:  [id number]    Date/Time: 2018  7:20 AM       Assessment / Plan:     RLE cellulitis / Chronic cutaneous venous stasis ulcer:  Wounds followed at wound care clinic. US negative for DVT on admission. Evaluated by vascular surgery this admission and recommended compression and elevation. Wound culture with heavy diphtheroids, providencia, heavy strep, beta hemolytic group C and pseudomonas. S/P I&D by gen surgery . -- continue meropenem per ID   -- dressing changes per surgeon       KATHARINE:  Possibly drug related. Abx changed already. Appreciate renal eval. Concern for ATN vs AIN from vanc or zosyn. Continues to improve. -- monitor creatinine    -- continue IVF    Microscopic hematuria:  No gross blood noted in urinal at bedside. -- repeat UA    Hyperkalemia:  Likely from KATHARINE. Given SPS with improvement. -- follow K   -- continue low K diet    Mild chronic anemia:  HGB stable this admission. Folate and B12 WNL. Iron profile more c/w ACD.    -- monitor HGB    Chronic pain:  On chronic methadone. Appreciate palliative care managing acute / chronic pain. Obesity (BMI 30-39. 9): Will benefit from weight loss.      Tobacco abuse:  Already advised cessation.     Sepsis:  Resolved              Subjective:     Chief Complaint:  Leg pain    Pain better after adjustments in medications. Worse with movement. No n/v.    ROS:  (bold if positive, if negative)              Objective:       Vitals:          Last 24hrs VS reviewed since prior progress note.  Most recent are:    Visit Vitals    /82 (BP 1 Location: Right arm, BP Patient Position: At rest)    Pulse 64    Temp 98.7 °F (37.1 °C)    Resp 18    Ht 5' 7\" (1.702 m)    Wt 98.4 kg (217 lb)    SpO2 98%    BMI 33.99 kg/m2     SpO2 Readings from Last 6 Encounters:   18 98%   18 98%   01/15/18 98%   13 93%    O2 Flow Rate (L/min): 2 l/min       Intake/Output Summary (Last 24 hours) at 04/26/18 0720  Last data filed at 04/25/18 1800   Gross per 24 hour   Intake              550 ml   Output             1000 ml   Net             -450 ml          Exam:     Physical Exam:    Gen:  Well-developed, well-nourished, in no acute distress  HEENT:  Pink conjunctivae, hearing intact to voice, moist mucous membranes  Resp:  No accessory muscle use, clear breath sounds without wheezes rales or rhonchi  Card:  No murmurs, normal S1, S2 without thrills  Abd:  Soft, non-tender, non-distended, normoactive bowel sounds are present  Musc:  B/l LE ace wrapped  Neuro:   Face symmetric, tongue midline, speech fluent,  strength is 5/5 bilaterally, follows commands appropriately  Psych:  Good insight, alert    Medications Reviewed: (see below)    Lab Data Reviewed: (see below)    ______________________________________________________________________    Medications:     Current Facility-Administered Medications   Medication Dose Route Frequency    HYDROmorphone (DILAUDID) injection 1 mg  1 mg IntraVENous Q4H PRN    oxyCODONE IR (ROXICODONE) tablet 25 mg  25 mg Oral Q4H PRN    meropenem (MERREM) 500 mg in 0.9% sodium chloride (MBP/ADV) 50 mL MBP  500 mg IntraVENous Q6H    polyethylene glycol (MIRALAX) packet 17 g  17 g Oral DAILY    senna-docusate (PERICOLACE) 8.6-50 mg per tablet 1 Tab  1 Tab Oral QHS    naloxone (NARCAN) injection 0.4 mg  0.4 mg IntraVENous EVERY 2 MINUTES AS NEEDED    methadone (DOLOPHINE) tablet 110 mg  110 mg Oral DAILY    0.9% sodium chloride infusion  50 mL/hr IntraVENous CONTINUOUS    sodium chloride (NS) flush 5-10 mL  5-10 mL IntraVENous Q8H    sodium chloride (NS) flush 5-10 mL  5-10 mL IntraVENous PRN    acetaminophen (TYLENOL) tablet 650 mg  650 mg Oral Q4H PRN    prochlorperazine (COMPAZINE) injection 10 mg  10 mg IntraVENous Q6H PRN    zolpidem (AMBIEN) tablet 5 mg  5 mg Oral QHS PRN    enoxaparin (LOVENOX) injection 40 mg  40 mg SubCUTAneous Q24H  aluminum-magnesium hydroxide (MAALOX) oral suspension 30 mL  30 mL Oral Q6H PRN    sodium chloride (NS) flush 5-10 mL  5-10 mL IntraVENous PRN            Lab Review:     Recent Labs      04/24/18 0454   WBC  10.0   HGB  11.2*   HCT  35.2*   PLT  492*     Recent Labs      04/26/18   0050  04/25/18   0229  04/24/18 0454   NA  134*  133*  134*   K  4.4  4.8  4.7   CL  102  102  100   CO2  27  26  28   GLU  130*  138*  104*   BUN  27*  27*  23*   CREA  1.66*  1.84*  1.91*   CA  8.8  8.6  8.8   PHOS  4.6  4.0   --    ALB  2.2*  2.1*   --      No results found for: GLUCPOC      Total time spent with patient: [address] [place] [address] discussed with: Patient    Discussed:  Care Plan    Prophylaxis:  Lovenox    Disposition:  Home w/Family           ___________________________________________________    Attending Physician: Ariadne Crook MD

## 2018-04-26 NOTE — ANESTHESIA PREPROCEDURE EVALUATION
Anesthetic History   No history of anesthetic complications            Review of Systems / Medical History  Patient summary reviewed, nursing notes reviewed and pertinent labs reviewed    Pulmonary  Within defined limits                 Neuro/Psych   Within defined limits           Cardiovascular    Hypertension              Exercise tolerance: >4 METS     GI/Hepatic/Renal  Within defined limits              Endo/Other        Obesity     Other Findings              Physical Exam    Airway  Mallampati: II    Neck ROM: normal range of motion   Mouth opening: Normal     Cardiovascular  Regular rate and rhythm,  S1 and S2 normal,  no murmur, click, rub, or gallop  Rhythm: regular  Rate: normal         Dental  No notable dental hx       Pulmonary  Breath sounds clear to auscultation               Abdominal  GI exam deferred       Other Findings            Anesthetic Plan    ASA: 2  Anesthesia type: MAC          Induction: Intravenous  Anesthetic plan and risks discussed with: Patient

## 2018-04-27 LAB
ALBUMIN SERPL-MCNC: 2.4 G/DL (ref 3.5–5)
ANION GAP SERPL CALC-SCNC: 7 MMOL/L (ref 5–15)
BUN SERPL-MCNC: 25 MG/DL (ref 6–20)
BUN/CREAT SERPL: 16 (ref 12–20)
CALCIUM SERPL-MCNC: 9 MG/DL (ref 8.5–10.1)
CHLORIDE SERPL-SCNC: 101 MMOL/L (ref 97–108)
CO2 SERPL-SCNC: 26 MMOL/L (ref 21–32)
CREAT SERPL-MCNC: 1.61 MG/DL (ref 0.7–1.3)
ERYTHROCYTE [DISTWIDTH] IN BLOOD BY AUTOMATED COUNT: 14 % (ref 11.5–14.5)
GLUCOSE SERPL-MCNC: 104 MG/DL (ref 65–100)
HCT VFR BLD AUTO: 36.8 % (ref 36.6–50.3)
HGB BLD-MCNC: 11.5 G/DL (ref 12.1–17)
MCH RBC QN AUTO: 26 PG (ref 26–34)
MCHC RBC AUTO-ENTMCNC: 31.3 G/DL (ref 30–36.5)
MCV RBC AUTO: 83.1 FL (ref 80–99)
NRBC # BLD: 0 K/UL (ref 0–0.01)
NRBC BLD-RTO: 0 PER 100 WBC
PHOSPHATE SERPL-MCNC: 4.8 MG/DL (ref 2.6–4.7)
PLATELET # BLD AUTO: 435 K/UL (ref 150–400)
PMV BLD AUTO: 8.3 FL (ref 8.9–12.9)
POTASSIUM SERPL-SCNC: 4.5 MMOL/L (ref 3.5–5.1)
RBC # BLD AUTO: 4.43 M/UL (ref 4.1–5.7)
SODIUM SERPL-SCNC: 134 MMOL/L (ref 136–145)
WBC # BLD AUTO: 7.7 K/UL (ref 4.1–11.1)

## 2018-04-27 PROCEDURE — 74011250636 HC RX REV CODE- 250/636: Performed by: INTERNAL MEDICINE

## 2018-04-27 PROCEDURE — 36415 COLL VENOUS BLD VENIPUNCTURE: CPT | Performed by: INTERNAL MEDICINE

## 2018-04-27 PROCEDURE — 74011250637 HC RX REV CODE- 250/637: Performed by: INTERNAL MEDICINE

## 2018-04-27 PROCEDURE — 74011250637 HC RX REV CODE- 250/637: Performed by: FAMILY MEDICINE

## 2018-04-27 PROCEDURE — 74011000258 HC RX REV CODE- 258: Performed by: INTERNAL MEDICINE

## 2018-04-27 PROCEDURE — 80069 RENAL FUNCTION PANEL: CPT | Performed by: INTERNAL MEDICINE

## 2018-04-27 PROCEDURE — 85027 COMPLETE CBC AUTOMATED: CPT | Performed by: INTERNAL MEDICINE

## 2018-04-27 PROCEDURE — 65270000029 HC RM PRIVATE

## 2018-04-27 PROCEDURE — 51798 US URINE CAPACITY MEASURE: CPT

## 2018-04-27 PROCEDURE — 74011250636 HC RX REV CODE- 250/636: Performed by: FAMILY MEDICINE

## 2018-04-27 RX ORDER — GABAPENTIN 300 MG/1
300 CAPSULE ORAL 3 TIMES DAILY
Status: DISCONTINUED | OUTPATIENT
Start: 2018-04-27 | End: 2018-04-30 | Stop reason: HOSPADM

## 2018-04-27 RX ORDER — HYDROMORPHONE HYDROCHLORIDE 2 MG/ML
2 INJECTION, SOLUTION INTRAMUSCULAR; INTRAVENOUS; SUBCUTANEOUS ONCE
Status: COMPLETED | OUTPATIENT
Start: 2018-04-27 | End: 2018-04-27

## 2018-04-27 RX ADMIN — HYDROMORPHONE HYDROCHLORIDE 2 MG: 2 INJECTION INTRAMUSCULAR; INTRAVENOUS; SUBCUTANEOUS at 01:26

## 2018-04-27 RX ADMIN — HYDROMORPHONE HYDROCHLORIDE 2 MG: 2 INJECTION, SOLUTION INTRAMUSCULAR; INTRAVENOUS; SUBCUTANEOUS at 02:12

## 2018-04-27 RX ADMIN — MEROPENEM 500 MG: 500 INJECTION, POWDER, FOR SOLUTION INTRAVENOUS at 02:14

## 2018-04-27 RX ADMIN — ZOLPIDEM TARTRATE 5 MG: 5 TABLET ORAL at 21:14

## 2018-04-27 RX ADMIN — GABAPENTIN 300 MG: 300 CAPSULE ORAL at 15:19

## 2018-04-27 RX ADMIN — OXYCODONE HYDROCHLORIDE 30 MG: 5 TABLET ORAL at 15:19

## 2018-04-27 RX ADMIN — GABAPENTIN 300 MG: 300 CAPSULE ORAL at 21:14

## 2018-04-27 RX ADMIN — HYDROMORPHONE HYDROCHLORIDE 2 MG: 2 INJECTION INTRAMUSCULAR; INTRAVENOUS; SUBCUTANEOUS at 13:09

## 2018-04-27 RX ADMIN — OXYCODONE HYDROCHLORIDE 30 MG: 5 TABLET ORAL at 04:18

## 2018-04-27 RX ADMIN — OXYCODONE HYDROCHLORIDE 30 MG: 5 TABLET ORAL at 09:22

## 2018-04-27 RX ADMIN — MEROPENEM 500 MG: 500 INJECTION, POWDER, FOR SOLUTION INTRAVENOUS at 21:14

## 2018-04-27 RX ADMIN — Medication 10 ML: at 21:15

## 2018-04-27 RX ADMIN — OXYCODONE HYDROCHLORIDE 30 MG: 5 TABLET ORAL at 19:10

## 2018-04-27 RX ADMIN — MEROPENEM 500 MG: 500 INJECTION, POWDER, FOR SOLUTION INTRAVENOUS at 16:13

## 2018-04-27 RX ADMIN — OXYCODONE HYDROCHLORIDE 30 MG: 5 TABLET ORAL at 23:09

## 2018-04-27 RX ADMIN — OXYCODONE HYDROCHLORIDE 30 MG: 5 TABLET ORAL at 00:08

## 2018-04-27 RX ADMIN — METHADONE HYDROCHLORIDE 110 MG: 10 TABLET ORAL at 09:14

## 2018-04-27 RX ADMIN — HYDROMORPHONE HYDROCHLORIDE 2 MG: 2 INJECTION INTRAMUSCULAR; INTRAVENOUS; SUBCUTANEOUS at 17:10

## 2018-04-27 RX ADMIN — HYDROMORPHONE HYDROCHLORIDE 2 MG: 2 INJECTION INTRAMUSCULAR; INTRAVENOUS; SUBCUTANEOUS at 21:14

## 2018-04-27 RX ADMIN — Medication 10 ML: at 06:00

## 2018-04-27 RX ADMIN — HYDROMORPHONE HYDROCHLORIDE 2 MG: 2 INJECTION INTRAMUSCULAR; INTRAVENOUS; SUBCUTANEOUS at 05:47

## 2018-04-27 RX ADMIN — MEROPENEM 500 MG: 500 INJECTION, POWDER, FOR SOLUTION INTRAVENOUS at 09:28

## 2018-04-27 NOTE — PROGRESS NOTES
Bedside and Verbal shift change report given to [address] [place] [address] Street (oncoming nurse) by Nuno Charles (offgoing nurse). Report included the following information SBAR and Kardex.

## 2018-04-27 NOTE — PROGRESS NOTES
Problem: Falls - Risk of  Goal: *Absence of Falls  Document Larry Fall Risk and appropriate interventions in the flowsheet.    Outcome: Progressing Towards Goal  Fall Risk Interventions:  Mobility Interventions: Patient to call before getting OOB, Utilize walker, cane, or other assitive device         Medication Interventions: Patient to call before getting OOB, Teach patient to arise slowly    Elimination Interventions: Patient to call for help with toileting needs

## 2018-04-27 NOTE — PROGRESS NOTES
General Surgery Daily Progress Note    Patient: Yulisa Pollack MRN: [id number]  SSN: [id number]    YOB: 1975  Age: 39 y.o. Sex: male      Admit Date: 4/15/2018    Subjective:   C/o back spasms he attributes to \"laying in that bed too damn long\". Had c/o pain in legs overnight and Coban had been removed from right unna boot. States legs feel like they are \"burning\" but denies toe pain, numbness, tingling. Current Facility-Administered Medications   Medication Dose Route Frequency    prochlorperazine (COMPAZINE) tablet 10 mg  10 mg Oral Q6H PRN    HYDROmorphone (DILAUDID) injection 2 mg  2 mg IntraVENous Q4H PRN    oxyCODONE IR (ROXICODONE) tablet 30 mg  30 mg Oral Q4H PRN    meropenem (MERREM) 500 mg in 0.9% sodium chloride (MBP/ADV) 50 mL MBP  500 mg IntraVENous Q6H    polyethylene glycol (MIRALAX) packet 17 g  17 g Oral DAILY    senna-docusate (PERICOLACE) 8.6-50 mg per tablet 1 Tab  1 Tab Oral QHS    naloxone (NARCAN) injection 0.4 mg  0.4 mg IntraVENous EVERY 2 MINUTES AS NEEDED    methadone (DOLOPHINE) tablet 110 mg  110 mg Oral DAILY    0.9% sodium chloride infusion  50 mL/hr IntraVENous CONTINUOUS    sodium chloride (NS) flush 5-10 mL  5-10 mL IntraVENous Q8H    sodium chloride (NS) flush 5-10 mL  5-10 mL IntraVENous PRN    acetaminophen (TYLENOL) tablet 650 mg  650 mg Oral Q4H PRN    prochlorperazine (COMPAZINE) injection 10 mg  10 mg IntraVENous Q6H PRN    zolpidem (AMBIEN) tablet 5 mg  5 mg Oral QHS PRN    enoxaparin (LOVENOX) injection 40 mg  40 mg SubCUTAneous Q24H    aluminum-magnesium hydroxide (MAALOX) oral suspension 30 mL  30 mL Oral Q6H PRN    sodium chloride (NS) flush 5-10 mL  5-10 mL IntraVENous PRN        Objective:   04/27 0701 - 04/27 1900  In: -   Out: 400 [Urine:400]  04/25 1901 - 04/27 0700  In: 1370 [P.O.:120;  I.V.:1250]  Out: 2550 [Urine:2550]  Patient Vitals for the past 8 hrs:   BP Temp Pulse Resp SpO2   04/27/18 0731 141/66 98.5 °F (36.9 °C) 83 - 95 %   04/27/18 0400 137/76 98.7 °F (37.1 °C) 71 18 96 %       Physical Exam:  General: Alert, uncomfortable, sitting in chair  Lungs: Unlabored  Heart:  Regular rate and  rhythm  Extremities: Bilateral lower extremity unna boots in place with overwrap off the right one. Toes are warm, sensation intact, movement intact. Skin:  Warm and dry, no rash  Abdomen: Soft, nontender, nondistended    Labs:   Recent Labs      04/27/18   0516   WBC  7.7   HGB  11.5*   HCT  36.8   PLT  435*     Recent Labs      04/27/18   0516   NA  134*   K  4.5   CL  101   CO2  26   GLU  104*   BUN  25*   CREA  1.61*   CA  9.0   PHOS  4.8*   ALB  2.4*       Assessment / Plan:   · 4/24 mechanical debridement of bilateral lower legs  · 4/26 mechanical debridement of bilateral lower legs, placement of bilateral unna boots  · I do not think the unna boots are too tight. Ideally we will keep them in place if he will tolerate. Will attempt replacing Coban on right once his back pain is better  · Defer pain management to palliative care. · If he is here on Monday, we will do first unna boot change then. If he is discharged he can follow up at [facility] for continued wound care.      Will see as needed over the weekend

## 2018-04-27 NOTE — PROGRESS NOTES
[facility] Progress Note           Julio Dillard DO    [phone] Office  [phone]  Fax    2018      Assessment & Plan:   1. Right lower extremity cellulitis in the setting of a nonhealing chronic venous stasis ulcers. Cultures growing diptheroids, providencia, klebsiella, pseudomonas, and group c streptococcus. S/p OR debridement  and . Continue meropenem through Monday. Will examine wounds at dressing change and make further abx recommendations at that time  2. KATHARINE. Improving. Suspect related to abx. Stopped vancomycin and pip-tazo. Subjective:     Feeling better    Objective:     Vitals:   Visit Vitals    /66 (BP 1 Location: Left arm, BP Patient Position: Sitting)    Pulse 83    Temp 98.5 °F (36.9 °C)    Resp 18    Ht 5' 7\" (1.702 m)    Wt 217 lb 9.5 oz (98.7 kg)    SpO2 95%    BMI 34.08 kg/m2        Tmax:  Temp (24hrs), Av.5 °F (36.9 °C), Min:98.1 °F (36.7 °C), Max:98.8 °F (37.1 °C)        Physical Examination:   General:  Alert, cooperative, no distress   Head:  Normocephalic, atraumatic. Eyes:  Conjunctivae clear   Neck: Supple       Lungs:   No distress. Chest wall:     Heart:     Abdomen:      Extremities: Legs dressed   Skin:     Neurologic: CNII-XII intact. Labs:        No lab exists for component: ITNL   No results for input(s): CPK, CKMB, TROIQ in the last 72 hours. Recent Labs      18   0516  18   0050  18   0229   NA  134*  134*  133*   K  4.5  4.4  4.8   CL  101  102  102   CO2  26  27  26   BUN  25*  27*  27*   CREA  1.61*  1.66*  1.84*   GLU  104*  130*  138*   PHOS  4.8*  4.6  4.0   ALB  2.4*  2.2*  2.1*   WBC  7.7   --    --    HGB  11.5*   --    --    HCT  36.8   --    --    PLT  435*   --    --      No results for input(s): INR, PTP, APTT in the last 72 hours.     No lab exists for component: INREXT, INREXT  Needs: urine analysis, urine sodium, protein and creatinine  Lab Results Component Value Date/Time    Sodium urine, random 50 04/19/2018 01:56 PM    Creatinine, urine 82.29 04/19/2018 01:56 PM         Cultures:     No results found for: SDES  Lab Results   Component Value Date/Time    Culture result: HEAVY DIPHTHEROIDS (A) 04/16/2018 12:16 AM    Culture result: HEAVY PROVIDENCIA RETTGERI (A) 04/16/2018 12:16 AM    Culture result: HEAVY PSEUDOMONAS AERUGINOSA (A) 04/16/2018 12:16 AM    Culture result: LIGHT KLEBSIELLA OXYTOCA (A) 04/16/2018 12:16 AM    Culture result: (A) 04/16/2018 12:16 AM     HEAVY STREPTOCOCCI, BETA HEMOLYTIC GROUP C Penicillin and ampicillin are drugs of choice for treatment of beta-hemolytic streptococcal infections. Susceptibility testing of penicillins and beta-lactams approved by the FDA for treatment of beta-hemolytic streptococcal infections need not be performed routinely, because nonsusceptible isolates are extremely rare.  CLSI 2012       Radiology:     Medications       Current Facility-Administered Medications   Medication Dose Route Frequency Last Dose    prochlorperazine (COMPAZINE) tablet 10 mg  10 mg Oral Q6H PRN      HYDROmorphone (DILAUDID) injection 2 mg  2 mg IntraVENous Q4H PRN 2 mg at 04/27/18 0547    oxyCODONE IR (ROXICODONE) tablet 30 mg  30 mg Oral Q4H PRN 30 mg at 04/27/18 0922    meropenem (MERREM) 500 mg in 0.9% sodium chloride (MBP/ADV) 50 mL MBP  500 mg IntraVENous Q6H 500 mg at 04/27/18 0928    polyethylene glycol (MIRALAX) packet 17 g  17 g Oral DAILY Stopped at 04/23/18 0900    senna-docusate (PERICOLACE) 8.6-50 mg per tablet 1 Tab  1 Tab Oral QHS 1 Tab at 04/22/18 2040    naloxone (NARCAN) injection 0.4 mg  0.4 mg IntraVENous EVERY 2 MINUTES AS NEEDED      methadone (DOLOPHINE) tablet 110 mg  110 mg Oral DAILY 110 mg at 04/27/18 0914    0.9% sodium chloride infusion  50 mL/hr IntraVENous CONTINUOUS 50 mL/hr at 04/26/18 1414    sodium chloride (NS) flush 5-10 mL  5-10 mL IntraVENous Q8H 10 mL at 04/27/18 0600    sodium chloride (NS) flush 5-10 mL  5-10 mL IntraVENous PRN      acetaminophen (TYLENOL) tablet 650 mg  650 mg Oral Q4H  mg at 04/16/18 1957    prochlorperazine (COMPAZINE) injection 10 mg  10 mg IntraVENous Q6H PRN 10 mg at 04/16/18 0636    zolpidem (AMBIEN) tablet 5 mg  5 mg Oral QHS PRN      enoxaparin (LOVENOX) injection 40 mg  40 mg SubCUTAneous Q24H 40 mg at 04/18/18 2006    aluminum-magnesium hydroxide (MAALOX) oral suspension 30 mL  30 mL Oral Q6H PRN 30 mL at 04/16/18 0629    sodium chloride (NS) flush 5-10 mL  5-10 mL IntraVENous PRN             Case discussed with:     Maryan Soto, DO

## 2018-04-27 NOTE — PROGRESS NOTES
Palliative Medicine Consult  Javad: [phone] ([phone])    Patient Name: Nnamdi Riley  YOB: 1975    Date of Initial Consult: 04/17/2018  Reason for Consult: Overwhelming Symptoms  Requesting Provider: Sinan Field MD   Primary Care Physician: None     SUMMARY:   Nnamdi Riley is a 39 y. o. with a past history of obesity, venous stasis, chronic pain, who was admitted on 4/15/2018 from home with a diagnosis of cellulitis of right lower extremity. Patient has history of chronic venous stasis with ulcers that [facility] for but had began to do his own wound care when he could arrange transportation. Presented to the ED with complaints of increasing pain and foul smelling discharge for the right leg. Was admitted and started on vancomycin and zosyn. Wound cultures growing diphtheroids, GNR, and pseudomonas. Vascular surgery consulted and venous dopplers negative for DVT, and no evidence of arterial insufficiency. Recommend antibiotics, compression and elevation. Current medical issues leading to Palliative Medicine involvement include: overwhelming symptoms, acute pain related to venous stasis ulcers and cellulitis. .    SH: Single, lives alone, works as a contractor. Interim History:  4/18: Creat increased overnight to 2; antibiotics changed from vanc/zosyn to cefepime for cellulitis. Continues with pain, currently 9/10.     4/25: Post op day one from bilat lower extremity debridement. Reports pain currently at 10/10. PALLIATIVE DIAGNOSES:   1. Acute right leg pain  2. Chronic pain  3. Physical debility  4. Chronic venous stasis  5. S/P dressing change   6. Chronic methadone use       PLAN:   1. Met with patient and reviewed issues from overnight. Patient describes burning pain in his legs. Unna boots with coban wraps but removed coban from right leg. Now stating his back is hurting from being in the bed as well.    2. Pain management-once again, he had been weaned down to dilaudid 0.5 mg IV every 12 hours but then this was increased to dilaudid 1 mg every 4 hours with his \"debridement\" on 4/25 and then when he returned from the OR last night, he was \"screaming/crying\" so dilaudid was increased to 2 mg IV every 4 hours and oxycodone increased to 30 mg every 4 hours. Describes pain as \"burning\" pain. Difficult situation because no matter what he is given, pain seems to be a 10/10. Talked with Danny Johnson(PA/surgery) and they did not do any sharp debridement->just a cleansing and then wraps placed. In reviewing overnight, he has used dilaudid 2 mg IV x 4 doses and oxycodone 30 mg x 3 doses. No plan for any surgical intervention today. Recommend the following: Continue dilaudid 2 mg IV every 4 hours and oxycodone 30 mg po every 4 hours prn for today. In the morning, would decrease the dilaudid to 1 mg every 4 hours prn pain, then Sunday, would lower to 0.5 mg IV every 4 hours. Our team then will see on Monday and make further adjustments. Can keep the oxycodone dose the same for now. If concerns over the weekend, please call on-call provider([phone])  3. Will start gabapentin 300 mg tid since he describes \"burning pain\" which often can equate to neuropathic pain. Explained to patient this is a non-opioid that may help this type of pain. 4. Once again reviewed with him the balance between managing his pain but not providing too much opioids, especially given his high dose methadone. 5. Patient reports he has been on and off methadone for chronic pain for over 10 years, and currently goes [employer] daily for his methadone dose of 110 mg daily. 6. Initial consult note routed to primary continuity provider  7.  Communicated plan of care with: Palliative IDT       GOALS OF CARE / TREATMENT PREFERENCES:     GOALS OF CARE: full restorative treatment in an effort to recover and return home  Patient/Health Care Proxy Stated Goals: Cure      TREATMENT PREFERENCES:   Code Status: Full Code    Advance Care Planning:  Advance Care Planning 4/16/2018   Patient's Healthcare Decision Maker is: Verbal statement (Legal Next of Kin remains as decision maker)   Primary Decision Maker Name Jolanta Tijerina (friend)    Primary Decision Maker Phone Number [phone]   Primary Decision Maker Relationship to Patient Friend   Confirm Advance Directive None   Patient Would Like to Complete Advance Directive -       Medical Interventions: Full interventions   Other Instructions:   Artificially Administered Nutrition:  (not addressed)     Other:    As far as possible, the palliative care team has discussed with patient / health care proxy about goals of care / treatment preferences for patient. HISTORY:     History obtained from: patient, chart    CHIEF COMPLAINT: right leg pain    HPI/SUBJECTIVE:    The patient is:   [x] Verbal and participatory  [] Non-participatory due to:     Patient has history of chronic venous stasis with ulcers that he was going to the [address] [place] [address] Road for but had began to do his own wound care when he could arrange transportation. Presented to the ED with complaints of increasing pain and foul smelling discharge for the right leg. Was admitted and started on vancomycin and zosyn. Wound cultures growing diphtheroids, GNR, and pseudomonas. Vascular surgery consulted and venous dopplers negative for DVT, and no evidence of arterial insufficiency. Recommend antibiotics, compression and elevation. 4/18: Currently has p.o. oxycodone 15 mg every four hours as needed for pain and IV dilaudid 1 mg every four hours for severe pain/breakthrough pain. Reports that the lowest his pain scale got was 8-9/10, but does report that the oral provides better pain control than the IV.     4/25: Post op day one from bilat lower extremity debridement. Reports pain currently at 10/10.    4/27-patient screaming with pain last night.  Feels better this morning but describes more burning type pain. Clinical Pain Assessment (nonverbal scale for severity on nonverbal patients):   Clinical Pain Assessment  Severity: 10  Location: right LE  Character: throbbing, burning  Duration: chronic; worse since Saturday  Effect: limited movement/weight bearing  Factors: walking, increased edema  Frequency: constant          Duration: for how long has pt been experiencing pain (e.g., 2 days, 1 month, years)  Frequency: how often pain is an issue (e.g., several times per day, once every few days, constant)     FUNCTIONAL ASSESSMENT:     Palliative Performance Scale (PPS):  PPS: 70       PSYCHOSOCIAL/SPIRITUAL SCREENING:     Palliative IDT has assessed this patient for cultural preferences / practices and a referral made as appropriate to needs (Cultural Services, Patient Advocacy, Ethics, etc.)    Advance Care Planning:  Advance Care Planning 4/16/2018   Patient's Healthcare Decision Maker is: Verbal statement (Legal Next of Kin remains as decision maker)   Primary Decision Maker Name Ximena Figueroa (friend)    Primary Decision Maker Phone Number [phone]   Primary Decision Maker Relationship to Patient Friend   Confirm Advance Directive None   Patient Would Like to Complete Advance Directive -       Any spiritual / Confucianism concerns:  [] Yes /  [x] No    Caregiver Burnout:  [] Yes /  [] No /  [x] No Caregiver Present      Anticipatory grief assessment:   [x] Normal  / [] Maladaptive       ESAS Anxiety:      ESAS Depression:          REVIEW OF SYSTEMS:     Positive and pertinent negative findings in ROS are noted above in HPI. The following systems were [x] reviewed / [] unable to be reviewed as noted in HPI  Other findings are noted below. Systems: constitutional, ears/nose/mouth/throat, respiratory, gastrointestinal, genitourinary, musculoskeletal, integumentary, neurologic, psychiatric, endocrine. Positive findings noted below.   Modified ESAS Completed by: provider     Drowsiness: 0     Pain: 10           Dyspnea: 0     Constipation: No     Stool Occurrence(s): 1        PHYSICAL EXAM:     From RN flowsheet:  Wt Readings from Last 3 Encounters:   04/26/18 217 lb 9.5 oz (98.7 kg)   01/23/18 225 lb (102.1 kg)   01/14/18 210 lb (95.3 kg)     Blood pressure 138/53, pulse 85, temperature 98.8 °F (37.1 °C), resp. rate 17, height 5' 7\" (1.702 m), weight 217 lb 9.5 oz (98.7 kg), SpO2 94 %.     Pain Scale 1: Numeric (0 - 10)  Pain Intensity 1: 8  Pain Onset 1: last night  Pain Location 1: Leg  Pain Orientation 1: Other (comment) (bilat)  Pain Description 1: Burning  Pain Intervention(s) 1: Medication (see MAR)  Last bowel movement, if known:     Constitutional: Uncomfortable appearing in chair, alert and oriented  Eyes: pupils equal, anicteric  ENMT: no nasal discharge, moist mucous membranes  Cardiovascular: regular rhythm, +2 pitting COLLEEN in right foot, +1 pitting in left foot; left leg wrapped with coban and left has coban removed   Respiratory: breathing not labored, symmetric  Gastrointestinal: soft non-tender, +bowel sounds  Musculoskeletal: no deformity, no tenderness to palpation  Skin: warm, dry  Neurologic: following commands, moving all extremities  Psychiatric: full affect, no hallucinations  Other:       HISTORY:     Principal Problem:    Cellulitis (4/15/2018)    Active Problems:    Open leg wound (1/14/2018)      Chronic cutaneous venous stasis ulcer (HCC) (1/14/2018)      HTN (hypertension) (1/14/2018)      Chronic pain (1/14/2018)      Obesity (BMI 30-39.9) (4/15/2018)      Hyperglycemia (4/15/2018)      Sepsis ([address] (4/15/2018)      Tobacco abuse (4/15/2018)      Leukocytosis (4/16/2018)      Anemia (4/16/2018)      Hypomagnesemia (4/16/2018)      Hypophosphatemia (4/16/2018)      Past Medical History:   Diagnosis Date    Ill-defined condition     venous stasis ulcers    Obesity (BMI 30-39.9) 4/15/2018      Past Surgical History:   Procedure Laterality Date    HX CORONARY STENT PLACEMENT        Family History   Problem Relation Age of Onset    Family history unknown: Yes      History reviewed, no pertinent family history.   Social History   Substance Use Topics    Smoking status: Current Every Day Smoker     Packs/day: 1.00    Smokeless tobacco: Never Used    Alcohol use No     Allergies   Allergen Reactions    Tylenol [Acetaminophen] Other (comments)     abd pain         Current Facility-Administered Medications   Medication Dose Route Frequency    gabapentin (NEURONTIN) capsule 300 mg  300 mg Oral TID    prochlorperazine (COMPAZINE) tablet 10 mg  10 mg Oral Q6H PRN    HYDROmorphone (DILAUDID) injection 2 mg  2 mg IntraVENous Q4H PRN    oxyCODONE IR (ROXICODONE) tablet 30 mg  30 mg Oral Q4H PRN    meropenem (MERREM) 500 mg in 0.9% sodium chloride (MBP/ADV) 50 mL MBP  500 mg IntraVENous Q6H    polyethylene glycol (MIRALAX) packet 17 g  17 g Oral DAILY    senna-docusate (PERICOLACE) 8.6-50 mg per tablet 1 Tab  1 Tab Oral QHS    naloxone (NARCAN) injection 0.4 mg  0.4 mg IntraVENous EVERY 2 MINUTES AS NEEDED    methadone (DOLOPHINE) tablet 110 mg  110 mg Oral DAILY    0.9% sodium chloride infusion  50 mL/hr IntraVENous CONTINUOUS    sodium chloride (NS) flush 5-10 mL  5-10 mL IntraVENous Q8H    sodium chloride (NS) flush 5-10 mL  5-10 mL IntraVENous PRN    acetaminophen (TYLENOL) tablet 650 mg  650 mg Oral Q4H PRN    prochlorperazine (COMPAZINE) injection 10 mg  10 mg IntraVENous Q6H PRN    zolpidem (AMBIEN) tablet 5 mg  5 mg Oral QHS PRN    enoxaparin (LOVENOX) injection 40 mg  40 mg SubCUTAneous Q24H    aluminum-magnesium hydroxide (MAALOX) oral suspension 30 mL  30 mL Oral Q6H PRN    sodium chloride (NS) flush 5-10 mL  5-10 mL IntraVENous PRN          LAB AND IMAGING FINDINGS:     Lab Results   Component Value Date/Time    WBC 7.7 04/27/2018 05:16 AM    HGB 11.5 (L) 04/27/2018 05:16 AM    PLATELET 277 (H) 26/07/6611 05:16 AM     Lab Results   Component Value Date/Time    Sodium 134 (L) 04/27/2018 05:16 AM    Potassium 4.5 04/27/2018 05:16 AM    Chloride 101 04/27/2018 05:16 AM    CO2 26 04/27/2018 05:16 AM    BUN 25 (H) 04/27/2018 05:16 AM    Creatinine 1.61 (H) 04/27/2018 05:16 AM    Calcium 9.0 04/27/2018 05:16 AM    Magnesium 1.9 04/17/2018 02:44 AM    Phosphorus 4.8 (H) 04/27/2018 05:16 AM      Lab Results   Component Value Date/Time    AST (SGOT) 21 04/15/2018 09:29 PM    Alk. phosphatase 80 04/15/2018 09:29 PM    Protein, total 7.0 04/15/2018 09:29 PM    Albumin 2.4 (L) 04/27/2018 05:16 AM    Globulin 3.7 04/15/2018 09:29 PM     No results found for: INR, PTMR, PTP, PT1, PT2, APTT   Lab Results   Component Value Date/Time    Iron 9 (L) 04/17/2018 02:44 AM    TIBC 219 (L) 04/17/2018 02:44 AM    Iron % saturation 4 (L) 04/17/2018 02:44 AM    Ferritin 285 04/17/2018 02:44 AM      No results found for: PH, PCO2, PO2  No components found for: GLPOC   No results found for: CPK, CKMB             Total time:   Counseling / coordination time, spent as noted above:  > 50% counseling / coordination?:    Prolonged service was provided for  []30 min   []75 min in face to face time in the presence of the patient, spent as noted above. Time Start:   Time End:   Note: this can only be billed with 72863 (initial) or 56827 (follow up). If multiple start / stop times, list each separately.

## 2018-04-27 NOTE — PROGRESS NOTES
RENAl    Renal fxn improving daily (albeit slowly). Results for Dottie Graham (MRN [id number]) as of 4/27/2018 13:33    4/27/2018 05:16  Sodium: 134 (L)  Potassium: 4.5  Chloride: 101  CO2: 26  Anion gap: 7  Glucose: 104 (H)  BUN: 25 (H)  Creatinine: 1.61 (H)  BUN/Creatinine ratio: 16  Calcium: 9.0  Phosphorus: 4.8 (H)      Will follow along, not a lot to add at this point  Available over weekend if needed.       Barbara Norwood MD

## 2018-04-27 NOTE — PROGRESS NOTES
Medical Progress Note      NAME: Cornelius Cheng   :  1975  MRM:  [id number]    Date/Time: 2018  9:23 AM       Assessment / Plan:     RLE cellulitis / Chronic cutaneous venous stasis ulcer:  Wounds followed at wound care clinic. US negative for DVT on admission. Evaluated by vascular surgery this admission and recommended compression and elevation. Wound culture with heavy diphtheroids, providencia, heavy strep, beta hemolytic group C and pseudomonas. S/P I&D by gen surgery  and . Had severe pain last night, but pain in legs better today. -- continue meropenem per ID   -- will await surgery recs to continue with unnaboot or not       KATHARINE:  Possibly drug related. Abx changed already. Appreciate renal eval. Concern for ATN vs AIN from vanc or zosyn. Continues to improve. -- monitor creatinine    -- continue IVF    Mild corrected hypercalcemia:  Not on admission. Suspect this is from decreased mobility. -- monitor    Microscopic hematuria:  No gross blood noted in urinal at bedside. Resolved by repeat UA. Hyperkalemia:  Likely from KATHARINE. Given SPS with improvement. -- continue low K diet    Mild chronic anemia:  HGB stable this admission. Folate and B12 WNL. Iron profile more c/w ACD. Chronic pain:  On chronic methadone. Appreciate palliative care managing acute / chronic pain. Had increase in IV dilaudid  to 2 mg. -- continue methadone, oxycodone, and diluadid    Obesity (BMI 30-39. 9): Will benefit from weight loss.      Tobacco abuse:  Already advised cessation.     Sepsis:  Resolved              Subjective:     Chief Complaint:  Cramping back and lower abd    Developed cramps in lower back this AM.  Then when getting OOB, developed cramping of lower abdomen. Had BM this AM.    ROS:  (bold if positive, if negative)    Abd Pain          Objective:       Vitals:          Last 24hrs VS reviewed since prior progress note.  Most recent are:    Visit Vitals    /66 (BP 1 Location: Left arm, BP Patient Position: Sitting)    Pulse 83    Temp 98.5 °F (36.9 °C)    Resp 18    Ht 5' 7\" (1.702 m)    Wt 98.7 kg (217 lb 9.5 oz)    SpO2 95%    BMI 34.08 kg/m2     SpO2 Readings from Last 6 Encounters:   04/27/18 95%   01/23/18 98%   01/15/18 98%   06/25/13 93%    O2 Flow Rate (L/min): 2 l/min       Intake/Output Summary (Last 24 hours) at 04/27/18 8182  Last data filed at 04/27/18 0737   Gross per 24 hour   Intake             1370 ml   Output             2950 ml   Net            -1580 ml          Exam:     Physical Exam:    Gen:  Well-developed, well-nourished, in no acute distress  HEENT:  Pink conjunctivae, hearing intact to voice, moist mucous membranes  Resp:  No accessory muscle use, clear breath sounds without wheezes rales or rhonchi  Card:  No murmurs, normal S1, S2 without thrills  Abd:  Soft, non-tender, distended, normoactive bowel sounds are present  Musc:  B/l LE unnaboots, b/l lower paraspinal spasm  Neuro:   Face symmetric, tongue midline, speech fluent,  strength is 5/5 bilaterally, follows commands appropriately  Psych:  Good insight, alert    Medications Reviewed: (see below)    Lab Data Reviewed: (see below)    ______________________________________________________________________    Medications:     Current Facility-Administered Medications   Medication Dose Route Frequency    prochlorperazine (COMPAZINE) tablet 10 mg  10 mg Oral Q6H PRN    HYDROmorphone (DILAUDID) injection 2 mg  2 mg IntraVENous Q4H PRN    oxyCODONE IR (ROXICODONE) tablet 30 mg  30 mg Oral Q4H PRN    meropenem (MERREM) 500 mg in 0.9% sodium chloride (MBP/ADV) 50 mL MBP  500 mg IntraVENous Q6H    polyethylene glycol (MIRALAX) packet 17 g  17 g Oral DAILY    senna-docusate (PERICOLACE) 8.6-50 mg per tablet 1 Tab  1 Tab Oral QHS    naloxone (NARCAN) injection 0.4 mg  0.4 mg IntraVENous EVERY 2 MINUTES AS NEEDED    methadone (DOLOPHINE) tablet 110 mg  110 mg Oral DAILY    0.9% sodium chloride infusion  50 mL/hr IntraVENous CONTINUOUS    sodium chloride (NS) flush 5-10 mL  5-10 mL IntraVENous Q8H    sodium chloride (NS) flush 5-10 mL  5-10 mL IntraVENous PRN    acetaminophen (TYLENOL) tablet 650 mg  650 mg Oral Q4H PRN    prochlorperazine (COMPAZINE) injection 10 mg  10 mg IntraVENous Q6H PRN    zolpidem (AMBIEN) tablet 5 mg  5 mg Oral QHS PRN    enoxaparin (LOVENOX) injection 40 mg  40 mg SubCUTAneous Q24H    aluminum-magnesium hydroxide (MAALOX) oral suspension 30 mL  30 mL Oral Q6H PRN    sodium chloride (NS) flush 5-10 mL  5-10 mL IntraVENous PRN            Lab Review:     Recent Labs      04/27/18 0516   WBC  7.7   HGB  11.5*   HCT  36.8   PLT  435*     Recent Labs      04/27/18   0516  04/26/18   0050  04/25/18   0229   NA  134*  134*  133*   K  4.5  4.4  4.8   CL  101  102  102   CO2  26  27  26   GLU  104*  130*  138*   BUN  25*  27*  27*   CREA  1.61*  1.66*  1.84*   CA  9.0  8.8  8.6   PHOS  4.8*  4.6  4.0   ALB  2.4*  2.2*  2.1*     No results found for: GLUCPOC      Total time spent with patient: [address] [place] [address] discussed with: Patient    Discussed:  Care Plan    Prophylaxis:  Lovenox    Disposition:  Home w/Family           ___________________________________________________    Attending Physician: Grant Shirley MD

## 2018-04-27 NOTE — ROUTINE PROCESS
Patient yelling out in pain. Patient states that his legs feel like they are burning and his pain is not under control. Patient demanded to have the physician called. Dr. Davy Huang contacted. One time dose of additional 2mg Dilaudid ordered. Patient placed on continuous pulse ox monitor.

## 2018-04-27 NOTE — ROUTINE PROCESS
Bedside and Verbal shift change report given to Charmayne Angst, RN (oncoming nurse) by Dangelo Murray RN (offgoing nurse). Report included the following information SBAR, Kardex, ED Summary, OR Summary, Intake/Output, MAR and Recent Results.

## 2018-04-27 NOTE — ROUTINE PROCESS
Bedside and Verbal shift change report given to [address] [place] [address] Clifford and Emiliano Becerra (oncoming nurse) by Lynn Farias (offgoing nurse). Report included the following information SBAR, Kardex, MAR and Recent Results.

## 2018-04-28 LAB
ALBUMIN SERPL-MCNC: 2.9 G/DL (ref 3.5–5)
ANION GAP SERPL CALC-SCNC: 10 MMOL/L (ref 5–15)
BUN SERPL-MCNC: 23 MG/DL (ref 6–20)
BUN/CREAT SERPL: 15 (ref 12–20)
CALCIUM SERPL-MCNC: 9.5 MG/DL (ref 8.5–10.1)
CHLORIDE SERPL-SCNC: 98 MMOL/L (ref 97–108)
CO2 SERPL-SCNC: 25 MMOL/L (ref 21–32)
CREAT SERPL-MCNC: 1.52 MG/DL (ref 0.7–1.3)
GLUCOSE SERPL-MCNC: 113 MG/DL (ref 65–100)
PHOSPHATE SERPL-MCNC: 4.2 MG/DL (ref 2.6–4.7)
POTASSIUM SERPL-SCNC: 4.6 MMOL/L (ref 3.5–5.1)
SODIUM SERPL-SCNC: 133 MMOL/L (ref 136–145)

## 2018-04-28 PROCEDURE — 74011000258 HC RX REV CODE- 258: Performed by: INTERNAL MEDICINE

## 2018-04-28 PROCEDURE — 65270000029 HC RM PRIVATE

## 2018-04-28 PROCEDURE — 74011250636 HC RX REV CODE- 250/636: Performed by: INTERNAL MEDICINE

## 2018-04-28 PROCEDURE — 80069 RENAL FUNCTION PANEL: CPT | Performed by: INTERNAL MEDICINE

## 2018-04-28 PROCEDURE — 36415 COLL VENOUS BLD VENIPUNCTURE: CPT | Performed by: INTERNAL MEDICINE

## 2018-04-28 PROCEDURE — 74011250637 HC RX REV CODE- 250/637: Performed by: NURSE PRACTITIONER

## 2018-04-28 PROCEDURE — 74011250637 HC RX REV CODE- 250/637: Performed by: INTERNAL MEDICINE

## 2018-04-28 PROCEDURE — 74011250636 HC RX REV CODE- 250/636: Performed by: FAMILY MEDICINE

## 2018-04-28 PROCEDURE — 74011250637 HC RX REV CODE- 250/637: Performed by: FAMILY MEDICINE

## 2018-04-28 RX ORDER — HYDROMORPHONE HYDROCHLORIDE 2 MG/ML
1 INJECTION, SOLUTION INTRAMUSCULAR; INTRAVENOUS; SUBCUTANEOUS
Status: DISCONTINUED | OUTPATIENT
Start: 2018-04-28 | End: 2018-04-29

## 2018-04-28 RX ADMIN — Medication 10 ML: at 14:00

## 2018-04-28 RX ADMIN — HYDROMORPHONE HYDROCHLORIDE 1 MG: 2 INJECTION INTRAMUSCULAR; INTRAVENOUS; SUBCUTANEOUS at 18:09

## 2018-04-28 RX ADMIN — HYDROMORPHONE HYDROCHLORIDE 2 MG: 2 INJECTION INTRAMUSCULAR; INTRAVENOUS; SUBCUTANEOUS at 08:18

## 2018-04-28 RX ADMIN — GABAPENTIN 300 MG: 300 CAPSULE ORAL at 22:07

## 2018-04-28 RX ADMIN — GABAPENTIN 300 MG: 300 CAPSULE ORAL at 08:18

## 2018-04-28 RX ADMIN — METHADONE HYDROCHLORIDE 110 MG: 10 TABLET ORAL at 08:18

## 2018-04-28 RX ADMIN — POLYETHYLENE GLYCOL 3350 17 G: 17 POWDER, FOR SOLUTION ORAL at 08:18

## 2018-04-28 RX ADMIN — OXYCODONE HYDROCHLORIDE 30 MG: 5 TABLET ORAL at 10:54

## 2018-04-28 RX ADMIN — OXYCODONE HYDROCHLORIDE 30 MG: 5 TABLET ORAL at 15:57

## 2018-04-28 RX ADMIN — OXYCODONE HYDROCHLORIDE 30 MG: 5 TABLET ORAL at 20:40

## 2018-04-28 RX ADMIN — MEROPENEM 500 MG: 500 INJECTION, POWDER, FOR SOLUTION INTRAVENOUS at 20:42

## 2018-04-28 RX ADMIN — Medication 10 ML: at 22:00

## 2018-04-28 RX ADMIN — MEROPENEM 500 MG: 500 INJECTION, POWDER, FOR SOLUTION INTRAVENOUS at 10:54

## 2018-04-28 RX ADMIN — GABAPENTIN 300 MG: 300 CAPSULE ORAL at 15:45

## 2018-04-28 RX ADMIN — HYDROMORPHONE HYDROCHLORIDE 2 MG: 2 INJECTION INTRAMUSCULAR; INTRAVENOUS; SUBCUTANEOUS at 02:55

## 2018-04-28 RX ADMIN — MEROPENEM 500 MG: 500 INJECTION, POWDER, FOR SOLUTION INTRAVENOUS at 02:55

## 2018-04-28 RX ADMIN — HYDROMORPHONE HYDROCHLORIDE 1 MG: 2 INJECTION INTRAMUSCULAR; INTRAVENOUS; SUBCUTANEOUS at 13:47

## 2018-04-28 RX ADMIN — SODIUM CHLORIDE 50 ML/HR: 900 INJECTION, SOLUTION INTRAVENOUS at 11:02

## 2018-04-28 RX ADMIN — MEROPENEM 500 MG: 500 INJECTION, POWDER, FOR SOLUTION INTRAVENOUS at 15:45

## 2018-04-28 RX ADMIN — HYDROMORPHONE HYDROCHLORIDE 1 MG: 2 INJECTION INTRAMUSCULAR; INTRAVENOUS; SUBCUTANEOUS at 22:04

## 2018-04-28 NOTE — PROGRESS NOTES
Addison Girard [facility] [place] [facility]  [address] [facility], [address] [place] [address]  [phone]      Medical Progress Note      NAME:         Diana Gusman   :        1975  MRM:        [id number]    Date:          2018      Subjective: Patient has been seen and examined as a follow up for cellulitis right lower extremity. Chart, labs, diagnostics reviewed. He says he has a burning continuous pain right heel. Overall pain remains well controlled. No nausea or vomiting. Afebrile    Objective:    Vital Signs:    Visit Vitals    /82 (BP 1 Location: Right arm, BP Patient Position: At rest)    Pulse 83    Temp 98.6 °F (37 °C)    Resp 17    Ht 5' 7\" (1.702 m)    Wt 95.3 kg (210 lb 1.6 oz)    SpO2 96%    BMI 32.91 kg/m2        Intake/Output Summary (Last 24 hours) at 18 1049  Last data filed at 18 1840   Gross per 24 hour   Intake                0 ml   Output              200 ml   Net             -200 ml        Physical Examination:    General:   Well looking and nourished patient in no acute distress  Eyes:   pink conjunctivae, PERRLA with no discharge. ENT:   no ottorrhea or rhinorrhea with moist mucous membranes  Neck: no masses, thyroid non-tender and trachea central.  Pulm:  clear breath sounds without crackles or wheezes  Card:  no JVD or murmurs, has regular and normal S1, S2 without thrills, bruits  Abd:  Soft, non-tender, non-distended, normoactive bowel sounds with no palpable organomegaly  Musc:  No cyanosis, clubbing, atrophy or deformities. Wrapped lower extremities  Skin:  No rashes, bruising or ulcers. Neuro: Awake and alert.  Generally a non focal exam. Follows commands appropriately  Psych:  Has a good insight and is oriented x 3    Current Facility-Administered Medications   Medication Dose Route Frequency    gabapentin (NEURONTIN) capsule 300 mg  300 mg Oral TID    prochlorperazine (COMPAZINE) tablet 10 mg  10 mg Oral Q6H PRN    HYDROmorphone (DILAUDID) injection 2 mg  2 mg IntraVENous Q4H PRN    oxyCODONE IR (ROXICODONE) tablet 30 mg  30 mg Oral Q4H PRN    meropenem (MERREM) 500 mg in 0.9% sodium chloride (MBP/ADV) 50 mL MBP  500 mg IntraVENous Q6H    polyethylene glycol (MIRALAX) packet 17 g  17 g Oral DAILY    senna-docusate (PERICOLACE) 8.6-50 mg per tablet 1 Tab  1 Tab Oral QHS    naloxone (NARCAN) injection 0.4 mg  0.4 mg IntraVENous EVERY 2 MINUTES AS NEEDED    methadone (DOLOPHINE) tablet 110 mg  110 mg Oral DAILY    0.9% sodium chloride infusion  50 mL/hr IntraVENous CONTINUOUS    sodium chloride (NS) flush 5-10 mL  5-10 mL IntraVENous Q8H    sodium chloride (NS) flush 5-10 mL  5-10 mL IntraVENous PRN    acetaminophen (TYLENOL) tablet 650 mg  650 mg Oral Q4H PRN    prochlorperazine (COMPAZINE) injection 10 mg  10 mg IntraVENous Q6H PRN    zolpidem (AMBIEN) tablet 5 mg  5 mg Oral QHS PRN    enoxaparin (LOVENOX) injection 40 mg  40 mg SubCUTAneous Q24H    aluminum-magnesium hydroxide (MAALOX) oral suspension 30 mL  30 mL Oral Q6H PRN        Laboratory data and review:    Recent Labs      04/27/18   0516   WBC  7.7   HGB  11.5*   HCT  36.8   PLT  435*     Recent Labs      04/28/18   0321  04/27/18   0516  04/26/18   0050   NA  133*  134*  134*   K  4.6  4.5  4.4   CL  98  101  102   CO2  25  26  27   GLU  113*  104*  130*   BUN  23*  25*  27*   CREA  1.52*  1.61*  1.66*   CA  9.5  9.0  8.8   PHOS  4.2  4.8*  4.6   ALB  2.9*  2.4*  2.2*     No components found for: GLPOC    Assessment and Plan:    RLE cellulitis / Chronic cutaneous venous stasis ulcer POA: venous doppler US negative for DVT on admission. Evaluated by vascular surgery this admission and recommended compression and elevation. Wound culture with heavy diphtheroids, providencia, heavy strep, beta hemolytic group C and pseudomonas. S/P I&D by gen surgery 4/24 and 4/26. Continue IV Meropenem, wound care and pain control. Chronic pain:  On chronic methadone. Seen and followed by palliative care managing acute / chronic pain. Would decrease IV dilaudid today to 1mg Q4hr. Continue methadone, oxycodone and gabapentin    KATHARINE/ mild hyponatremia:  Possibly drug related. Abx changed already. Appreciate renal eval. Concern for ATN vs AIN from vanc or zosyn. Cr much better. Continue to follow renal function and IVFs. Mild corrected hypercalcemia: Not on admission. Suspect this is from decreased mobility. Stable      Microscopic hematuria:  No gross blood noted in urinal at bedside. Resolved by repeat UA.     Hyperkalemia: Likely from KATHARINE. Given SPS with improvement. K now normal      Mild chronic anemia:  HGB stable this admission. Folate and B12 WNL. Iron profile more c/w ACD.     Obesity (BMI 30-39. 9):   Will benefit from weight loss.      Tobacco abuse:  Already advised cessation.      Total time spent for the patient's care: [address] discussed with: Patient and Nursing Staff    Discussed:  Care Plan and D/C Planning    Prophylaxis:  Lovenox    Anticipated Disposition:  Home w/Family           ___________________________________________________    Attending Physician:   Balaji Rios MD

## 2018-04-28 NOTE — ROUTINE PROCESS
Bedside and Verbal shift change report given to Rachel Colon (oncoming nurse) by Tamera Cross RN (offgoing nurse). Report included the following information SBAR and Kardex.

## 2018-04-28 NOTE — PROGRESS NOTES
Bedside and Verbal shift change report given to Andrey (oncoming nurse) by Fifi Chaevs (offgoing nurse). Report included the following information SBAR, Kardex, Procedure Summary, Intake/Output, MAR, Accordion and Recent Results.

## 2018-04-29 PROCEDURE — 74011000258 HC RX REV CODE- 258: Performed by: INTERNAL MEDICINE

## 2018-04-29 PROCEDURE — 74011250636 HC RX REV CODE- 250/636: Performed by: INTERNAL MEDICINE

## 2018-04-29 PROCEDURE — 65270000029 HC RM PRIVATE

## 2018-04-29 PROCEDURE — 74011250637 HC RX REV CODE- 250/637: Performed by: INTERNAL MEDICINE

## 2018-04-29 PROCEDURE — 74011250637 HC RX REV CODE- 250/637: Performed by: FAMILY MEDICINE

## 2018-04-29 RX ORDER — HYDROMORPHONE HYDROCHLORIDE 2 MG/ML
0.5 INJECTION, SOLUTION INTRAMUSCULAR; INTRAVENOUS; SUBCUTANEOUS
Status: DISCONTINUED | OUTPATIENT
Start: 2018-04-29 | End: 2018-04-30 | Stop reason: HOSPADM

## 2018-04-29 RX ADMIN — HYDROMORPHONE HYDROCHLORIDE 1 MG: 2 INJECTION INTRAMUSCULAR; INTRAVENOUS; SUBCUTANEOUS at 03:24

## 2018-04-29 RX ADMIN — OXYCODONE HYDROCHLORIDE 30 MG: 5 TABLET ORAL at 06:42

## 2018-04-29 RX ADMIN — MEROPENEM 500 MG: 500 INJECTION, POWDER, FOR SOLUTION INTRAVENOUS at 03:26

## 2018-04-29 RX ADMIN — METHADONE HYDROCHLORIDE 110 MG: 10 TABLET ORAL at 08:33

## 2018-04-29 RX ADMIN — OXYCODONE HYDROCHLORIDE 30 MG: 5 TABLET ORAL at 00:44

## 2018-04-29 RX ADMIN — Medication 10 ML: at 04:00

## 2018-04-29 RX ADMIN — GABAPENTIN 300 MG: 300 CAPSULE ORAL at 15:22

## 2018-04-29 RX ADMIN — Medication 10 ML: at 13:37

## 2018-04-29 RX ADMIN — HYDROMORPHONE HYDROCHLORIDE 0.5 MG: 2 INJECTION INTRAMUSCULAR; INTRAVENOUS; SUBCUTANEOUS at 22:02

## 2018-04-29 RX ADMIN — OXYCODONE HYDROCHLORIDE 30 MG: 5 TABLET ORAL at 15:49

## 2018-04-29 RX ADMIN — HYDROMORPHONE HYDROCHLORIDE 0.5 MG: 2 INJECTION INTRAMUSCULAR; INTRAVENOUS; SUBCUTANEOUS at 13:34

## 2018-04-29 RX ADMIN — MEROPENEM 500 MG: 500 INJECTION, POWDER, FOR SOLUTION INTRAVENOUS at 08:33

## 2018-04-29 RX ADMIN — HYDROMORPHONE HYDROCHLORIDE 1 MG: 2 INJECTION INTRAMUSCULAR; INTRAVENOUS; SUBCUTANEOUS at 08:40

## 2018-04-29 RX ADMIN — MEROPENEM 500 MG: 500 INJECTION, POWDER, FOR SOLUTION INTRAVENOUS at 21:09

## 2018-04-29 RX ADMIN — MEROPENEM 500 MG: 500 INJECTION, POWDER, FOR SOLUTION INTRAVENOUS at 15:22

## 2018-04-29 RX ADMIN — GABAPENTIN 300 MG: 300 CAPSULE ORAL at 08:30

## 2018-04-29 RX ADMIN — OXYCODONE HYDROCHLORIDE 30 MG: 5 TABLET ORAL at 11:47

## 2018-04-29 RX ADMIN — GABAPENTIN 300 MG: 300 CAPSULE ORAL at 21:10

## 2018-04-29 RX ADMIN — Medication 10 ML: at 21:11

## 2018-04-29 RX ADMIN — HYDROMORPHONE HYDROCHLORIDE 0.5 MG: 2 INJECTION INTRAMUSCULAR; INTRAVENOUS; SUBCUTANEOUS at 17:58

## 2018-04-29 RX ADMIN — OXYCODONE HYDROCHLORIDE 30 MG: 5 TABLET ORAL at 19:45

## 2018-04-29 NOTE — PROGRESS NOTES
Problem: Falls - Risk of  Goal: *Absence of Falls  Document Larry Fall Risk and appropriate interventions in the flowsheet.    Outcome: Progressing Towards Goal  Fall Risk Interventions:  Mobility Interventions: Patient to call before getting OOB         Medication Interventions: Patient to call before getting OOB    Elimination Interventions: Patient to call for help with toileting needs

## 2018-04-29 NOTE — ROUTINE PROCESS
Bedside and Verbal shift change report given to [address] [place] [address] (oncoming nurse) by Erwin Martinez (offgoing nurse). Report included the following information SBAR, Kardex, MAR and Recent Results.

## 2018-04-29 NOTE — PROGRESS NOTES
Addison Girard [facility] 79  [address] [facility], [address] [place] [address]  [phone]      Medical Progress Note      NAME:         Trudy Ward   :        1975  MRM:        [id number]    Date:          2018      Subjective: Patient has been seen and examined as a follow up for cellulitis right lower extremity. Chart, labs, diagnostics reviewed. He denies any new pain. No fever or chills. Pain seems well controlled. Objective:    Vital Signs:    Visit Vitals    /79 (BP 1 Location: Right arm, BP Patient Position: Sitting)    Pulse 81    Temp 99 °F (37.2 °C)    Resp 16    Ht 5' 7\" (1.702 m)    Wt 97.1 kg (214 lb 1.1 oz)    SpO2 96%    BMI 33.53 kg/m2          Intake/Output Summary (Last 24 hours) at 18 4282  Last data filed at 18 9820   Gross per 24 hour   Intake                0 ml   Output             1650 ml   Net            -1650 ml        Physical Examination:    General:   Well looking and nourished patient in no acute distress  Eyes:   pink conjunctivae, PERRLA with no discharge. ENT:   no ottorrhea or rhinorrhea with moist mucous membranes  Pulm:  clear breath sounds without crackles or wheezes  Card:  no JVD or murmurs, has regular and normal S1, S2 without thrills, bruits  Abd:  Soft, non-tender, non-distended, normoactive bowel sounds   Musc:  No cyanosis, clubbing, atrophy or deformities. Wrapped lower extremities  Skin:  No rashes, bruising or ulcers. Neuro: Awake and alert.  Generally a non focal exam.   Psych:  Has a good insight and is oriented x 3    Current Facility-Administered Medications   Medication Dose Route Frequency    HYDROmorphone (DILAUDID) injection 0.5 mg  0.5 mg IntraVENous Q4H PRN    gabapentin (NEURONTIN) capsule 300 mg  300 mg Oral TID    prochlorperazine (COMPAZINE) tablet 10 mg  10 mg Oral Q6H PRN    oxyCODONE IR (ROXICODONE) tablet 30 mg  30 mg Oral Q4H PRN    meropenem (MERREM) 500 mg in 0.9% sodium chloride (MBP/ADV) 50 mL MBP  500 mg IntraVENous Q6H    polyethylene glycol (MIRALAX) packet 17 g  17 g Oral DAILY    senna-docusate (PERICOLACE) 8.6-50 mg per tablet 1 Tab  1 Tab Oral QHS    naloxone (NARCAN) injection 0.4 mg  0.4 mg IntraVENous EVERY 2 MINUTES AS NEEDED    methadone (DOLOPHINE) tablet 110 mg  110 mg Oral DAILY    sodium chloride (NS) flush 5-10 mL  5-10 mL IntraVENous Q8H    sodium chloride (NS) flush 5-10 mL  5-10 mL IntraVENous PRN    acetaminophen (TYLENOL) tablet 650 mg  650 mg Oral Q4H PRN    prochlorperazine (COMPAZINE) injection 10 mg  10 mg IntraVENous Q6H PRN    zolpidem (AMBIEN) tablet 5 mg  5 mg Oral QHS PRN    enoxaparin (LOVENOX) injection 40 mg  40 mg SubCUTAneous Q24H    aluminum-magnesium hydroxide (MAALOX) oral suspension 30 mL  30 mL Oral Q6H PRN        Laboratory data and review:    Recent Labs      04/27/18   0516   WBC  7.7   HGB  11.5*   HCT  36.8   PLT  435*     Recent Labs      04/28/18   0321  04/27/18   0516   NA  133*  134*   K  4.6  4.5   CL  98  101   CO2  25  26   GLU  113*  104*   BUN  23*  25*   CREA  1.52*  1.61*   CA  9.5  9.0   PHOS  4.2  4.8*   ALB  2.9*  2.4*     No components found for: GLPOC    Assessment and Plan:    RLE cellulitis / Chronic cutaneous venous stasis ulcer POA: venous doppler US negative for DVT on admission. Seen by vascular surgery this admission. Wound culture with heavy diphtheroids, providencia, heavy strep, beta hemolytic group C and pseudomonas. S/P I&D by gen surgery 4/24 and 4/26. Continue compression and elevation, IV Meropenem, wound care as well as pain control. Chronic pain:  On chronic methadone. Seen and followed by palliative care managing acute / chronic pain. Would decrease IV dilaudid further today to 0.5 mg Q4hr. Continue methadone, oxycodone and gabapentin    KATHARINE/ mild hyponatremia:  Possibly drug related. Abx changed already.  Appreciate renal eval. Concern for ATN vs AIN from vanc or zosyn. Cr much better. Check BMP in AM.      Mild corrected hypercalcemia: Not on admission. Suspect this is from decreased mobility. Stable      Microscopic hematuria:  No gross blood noted in urinal at bedside. Resolved by repeat UA.     Hyperkalemia: Likely from KATHARINE. Given SPS with improvement. K now normal      Mild chronic anemia:  HGB stable this admission. Folate and B12 WNL. Iron profile more c/w ACD.     Obesity (BMI 30-39. 9):   Will benefit from weight loss.      Tobacco abuse:  Already advised cessation.      Total time spent for the patient's care: [address] discussed with: Patient and Nursing Staff    Discussed:  Care Plan and D/C Planning    Prophylaxis:  Lovenox    Anticipated Disposition:  Home w/Family           ___________________________________________________    Attending Physician:   Mejia Ramirez MD

## 2018-04-29 NOTE — PROGRESS NOTES
Bedside and Verbal shift change report given to Do (oncoming nurse) by Erwin Amaya (offgoing nurse). Report included the following information SBAR, Kardex, MAR, Accordion and Recent Results.

## 2018-04-30 VITALS
HEART RATE: 82 BPM | SYSTOLIC BLOOD PRESSURE: 148 MMHG | WEIGHT: 216.49 LBS | HEIGHT: 67 IN | DIASTOLIC BLOOD PRESSURE: 81 MMHG | TEMPERATURE: 98.7 F | OXYGEN SATURATION: 95 % | RESPIRATION RATE: 19 BRPM | BODY MASS INDEX: 33.98 KG/M2

## 2018-04-30 PROBLEM — E83.39 HYPOPHOSPHATEMIA: Status: RESOLVED | Noted: 2018-04-16 | Resolved: 2018-04-30

## 2018-04-30 PROBLEM — A41.9 SEPSIS (HCC): Status: RESOLVED | Noted: 2018-04-15 | Resolved: 2018-04-30

## 2018-04-30 PROBLEM — R73.9 HYPERGLYCEMIA: Status: RESOLVED | Noted: 2018-04-15 | Resolved: 2018-04-30

## 2018-04-30 PROBLEM — E83.42 HYPOMAGNESEMIA: Status: RESOLVED | Noted: 2018-04-16 | Resolved: 2018-04-30

## 2018-04-30 PROBLEM — D72.829 LEUKOCYTOSIS: Status: RESOLVED | Noted: 2018-04-16 | Resolved: 2018-04-30

## 2018-04-30 LAB
ANION GAP SERPL CALC-SCNC: 6 MMOL/L (ref 5–15)
BUN SERPL-MCNC: 20 MG/DL (ref 6–20)
BUN/CREAT SERPL: 14 (ref 12–20)
CALCIUM SERPL-MCNC: 9.1 MG/DL (ref 8.5–10.1)
CHLORIDE SERPL-SCNC: 102 MMOL/L (ref 97–108)
CO2 SERPL-SCNC: 29 MMOL/L (ref 21–32)
CREAT SERPL-MCNC: 1.43 MG/DL (ref 0.7–1.3)
GLUCOSE SERPL-MCNC: 129 MG/DL (ref 65–100)
POTASSIUM SERPL-SCNC: 5.4 MMOL/L (ref 3.5–5.1)
SODIUM SERPL-SCNC: 137 MMOL/L (ref 136–145)

## 2018-04-30 PROCEDURE — 74011250637 HC RX REV CODE- 250/637: Performed by: INTERNAL MEDICINE

## 2018-04-30 PROCEDURE — 77030011256 HC DRSG MEPILEX <16IN NO BORD MOLN -A

## 2018-04-30 PROCEDURE — 36415 COLL VENOUS BLD VENIPUNCTURE: CPT | Performed by: INTERNAL MEDICINE

## 2018-04-30 PROCEDURE — 74011250637 HC RX REV CODE- 250/637: Performed by: NURSE PRACTITIONER

## 2018-04-30 PROCEDURE — 74011250637 HC RX REV CODE- 250/637: Performed by: FAMILY MEDICINE

## 2018-04-30 PROCEDURE — 77030018846 HC SOL IRR STRL H20 ICUM -A

## 2018-04-30 PROCEDURE — 74011250636 HC RX REV CODE- 250/636: Performed by: INTERNAL MEDICINE

## 2018-04-30 PROCEDURE — 74011000258 HC RX REV CODE- 258: Performed by: INTERNAL MEDICINE

## 2018-04-30 PROCEDURE — 80048 BASIC METABOLIC PNL TOTAL CA: CPT | Performed by: INTERNAL MEDICINE

## 2018-04-30 PROCEDURE — 77030022298 HC DRSG ANTIMIC ACTICT S&N -A

## 2018-04-30 PROCEDURE — 77030037877 HC DRSG MEPILEX >48IN BORD MOLN -A

## 2018-04-30 RX ORDER — GABAPENTIN 300 MG/1
300 CAPSULE ORAL 3 TIMES DAILY
Qty: 90 CAP | Refills: 0 | Status: SHIPPED | OUTPATIENT
Start: 2018-04-30 | End: 2018-05-30

## 2018-04-30 RX ORDER — SODIUM POLYSTYRENE SULFONATE 15 G/60ML
30 SUSPENSION ORAL; RECTAL
Status: COMPLETED | OUTPATIENT
Start: 2018-04-30 | End: 2018-04-30

## 2018-04-30 RX ORDER — AMOXICILLIN 250 MG
1 CAPSULE ORAL
Qty: 30 TAB | Refills: 0 | Status: SHIPPED | OUTPATIENT
Start: 2018-04-30 | End: 2018-05-30

## 2018-04-30 RX ORDER — HYDROMORPHONE HYDROCHLORIDE 2 MG/ML
1 INJECTION, SOLUTION INTRAMUSCULAR; INTRAVENOUS; SUBCUTANEOUS ONCE
Status: DISCONTINUED | OUTPATIENT
Start: 2018-04-30 | End: 2018-04-30

## 2018-04-30 RX ORDER — POLYETHYLENE GLYCOL 3350 17 G/17G
17 POWDER, FOR SOLUTION ORAL DAILY
Qty: 30 PACKET | Refills: 0 | Status: SHIPPED | OUTPATIENT
Start: 2018-05-01 | End: 2018-05-31

## 2018-04-30 RX ORDER — OXYCODONE HYDROCHLORIDE 30 MG/1
30 TABLET ORAL
Qty: 20 TAB | Refills: 0 | Status: SHIPPED | OUTPATIENT
Start: 2018-04-30

## 2018-04-30 RX ADMIN — OXYCODONE HYDROCHLORIDE 30 MG: 5 TABLET ORAL at 05:37

## 2018-04-30 RX ADMIN — POLYETHYLENE GLYCOL 3350 17 G: 17 POWDER, FOR SOLUTION ORAL at 09:39

## 2018-04-30 RX ADMIN — METHADONE HYDROCHLORIDE 110 MG: 10 TABLET ORAL at 09:40

## 2018-04-30 RX ADMIN — MEROPENEM 500 MG: 500 INJECTION, POWDER, FOR SOLUTION INTRAVENOUS at 03:00

## 2018-04-30 RX ADMIN — HYDROMORPHONE HYDROCHLORIDE 0.5 MG: 2 INJECTION INTRAMUSCULAR; INTRAVENOUS; SUBCUTANEOUS at 03:00

## 2018-04-30 RX ADMIN — SODIUM POLYSTYRENE SULFONATE 30 G: 15 SUSPENSION ORAL; RECTAL at 13:52

## 2018-04-30 RX ADMIN — HYDROMORPHONE HYDROCHLORIDE 0.5 MG: 2 INJECTION INTRAMUSCULAR; INTRAVENOUS; SUBCUTANEOUS at 07:39

## 2018-04-30 RX ADMIN — Medication 10 ML: at 03:00

## 2018-04-30 RX ADMIN — OXYCODONE HYDROCHLORIDE 30 MG: 5 TABLET ORAL at 09:41

## 2018-04-30 RX ADMIN — MEROPENEM 500 MG: 500 INJECTION, POWDER, FOR SOLUTION INTRAVENOUS at 09:45

## 2018-04-30 RX ADMIN — OXYCODONE HYDROCHLORIDE 30 MG: 5 TABLET ORAL at 13:48

## 2018-04-30 RX ADMIN — OXYCODONE HYDROCHLORIDE 30 MG: 5 TABLET ORAL at 00:38

## 2018-04-30 RX ADMIN — GABAPENTIN 300 MG: 300 CAPSULE ORAL at 09:40

## 2018-04-30 NOTE — PROGRESS NOTES
4/30/2018  2:35 PM  Case Management Note  Patient is being discharged. I followed up with his gap insurance for meds. Explained that they may require authorization mainly on the pain medications as confirmed by insurance. Patient wound appointment is made and on chart  Gave patient care card application and he has a list of PCP to chose from  Patient will need BMP drawn in 1 week to check K, appointment was made at [address] [place] [address] for 5/8 @ 1000am to drawn BMP  Patient is aware of time, place and what to bring.   Heidi Reyes, [address]

## 2018-04-30 NOTE — PROGRESS NOTES
Wound note- With surgery and ID in room, unna boots were removed. Silver reportedly causes burning sensation. Right unna boot lower than applied maybe due to re-enforcing over the weekend-led to some pocketed swelling. Legs are much less swollen in general and even though still painful -they are less painful. The supericial dermal injuries are mostly healed post surgical cleaning. Both legs have several areas as previously documented that are full thickness with adherent yellow slough. Drainage has no odor and was minimal from application of unna boots. Legs were cleaned with wound  -sprayed and patted dry. Aquaphor applied to all areas that were not open. Since silver burned we went back to xeroform on full thickness wounds and covered larger areas with 6x9 meplix transfer to absorb any drainage. A Schwab boot was then applied, using stockinette, cast padding, unna boot, then coban. Patient was encouraged to walk and exercise (ankle pumps, ankle circles). He should have an appt at wound clinic in 3-4 days then hopefully can return every 7 days for profore/unna-duke boot application to keep edema down/compression. Patient was fitted with bilateral cast shoes due to bandaging.     Vel Henriquez, PT, DPT, WCc

## 2018-04-30 NOTE — PROGRESS NOTES
General Surgery Daily Progress Note    Patient: Chanda Vang MRN: [id number]  SSN: [id number]    YOB: 1975  Age: 39 y.o. Sex: male      Admit Date: 4/15/2018    Subjective:   Feeling ok, pain controlled. Current Facility-Administered Medications   Medication Dose Route Frequency    HYDROmorphone (DILAUDID) injection 1 mg  1 mg IntraMUSCular ONCE    HYDROmorphone (DILAUDID) injection 0.5 mg  0.5 mg IntraVENous Q4H PRN    gabapentin (NEURONTIN) capsule 300 mg  300 mg Oral TID    prochlorperazine (COMPAZINE) tablet 10 mg  10 mg Oral Q6H PRN    oxyCODONE IR (ROXICODONE) tablet 30 mg  30 mg Oral Q4H PRN    polyethylene glycol (MIRALAX) packet 17 g  17 g Oral DAILY    senna-docusate (PERICOLACE) 8.6-50 mg per tablet 1 Tab  1 Tab Oral QHS    naloxone (NARCAN) injection 0.4 mg  0.4 mg IntraVENous EVERY 2 MINUTES AS NEEDED    methadone (DOLOPHINE) tablet 110 mg  110 mg Oral DAILY    sodium chloride (NS) flush 5-10 mL  5-10 mL IntraVENous Q8H    sodium chloride (NS) flush 5-10 mL  5-10 mL IntraVENous PRN    acetaminophen (TYLENOL) tablet 650 mg  650 mg Oral Q4H PRN    prochlorperazine (COMPAZINE) injection 10 mg  10 mg IntraVENous Q6H PRN    zolpidem (AMBIEN) tablet 5 mg  5 mg Oral QHS PRN    enoxaparin (LOVENOX) injection 40 mg  40 mg SubCUTAneous Q24H    aluminum-magnesium hydroxide (MAALOX) oral suspension 30 mL  30 mL Oral Q6H PRN        Objective:      04/28 1901 - 04/30 0700  In: -   Out: 1350 [[id number]]  Patient Vitals for the past 8 hrs:   BP Temp Pulse Resp SpO2 Weight   04/30/18 0845 148/81 98.7 °F (37.1 °C) 82 19 95 % -   04/30/18 0536 - - - - - 98.2 kg (216 lb 7.9 oz)       Physical Exam:  General: Alert, uncomfortable, sitting in chair  Lungs: Unlabored  Heart:  Regular rate and  rhythm  Extremities: Bilateral lower extremity unna boots removed. Wounds with less yellow slough, no purulent drainage. Edema improved.    Skin:  Warm and dry, no rash  Abdomen: Soft, nontender, nondistended    Labs:   No results for input(s): WBC, HGB, HCT, PLT, HGBEXT, HCTEXT, PLTEXT, HGBEXT, HCTEXT, PLTEXT in the last 72 hours. Recent Labs      04/30/18   0540  04/28/18   0321   NA  137  133*   K  5.4*  4.6   CL  102  98   CO2  29  25   GLU  129*  113*   BUN  20  23*   CREA  1.43*  1.52*   CA  9.1  9.5   PHOS   --   4.2   ALB   --   2.9*       Assessment / Plan:   · 4/24 mechanical debridement of bilateral lower legs  · 4/26 mechanical debridement of bilateral lower legs, placement of bilateral unna boots  · Present for dressing changes. Patient states Ag dressings cause burning and does not want them re-applied. Right leg swollen at top of dressing. Discussed with PT. Will apply Duke boot. Follow up for change and re-check at wound care clinic on Friday at 8am. We will sign off. Please call with questions.

## 2018-04-30 NOTE — DISCHARGE INSTRUCTIONS
Follow up at [facility] on  at 8am for recheck and dressing change. HOSPITALIST DISCHARGE INSTRUCTIONS    NAME:            Haydee Edge   :  1975   MRN:  [id number]     Date:     2018    ADMIT DATE: 4/15/2018     DISCHARGE DATE: 2018     PRINCIPAL ADMITTING DIAGNOSIS:  Cellulitis, Right Lower extremity veinous ulcers    DISCHARGE DIAGNOSES:  Principal Problem:    Cellulitis (4/15/2018)    Open leg wound (2018)    Chronic cutaneous venous stasis ulcer ([address] (2018)    HTN (hypertension) (2018)    Chronic pain (2018)    Obesity (BMI 30-39.9) (4/15/2018)    Tobacco abuse (4/15/2018)    Anemia (2018)    MEDICATIONS:    · It is important that medications are taken exactly as they are prescribed on the discharge medication instructions and keep them your  in the bottles provided by the pharmacist.   · Keep a list of the medication names, dosages, and times to be taken at all times. · Do not take other medications without consulting your doctor. · Have BMP check in 1 week    Recommended diet:  Regular low potassium diet    Recommended activity: Activity as tolerated    Post discharge care:    Notify follow up health care provider or return to the emergency department if you cannot get hold of your doctor if you feel worse or experience symptoms similar to those that brought you to hospital    Follow-up Information     Follow up With Details Comments [address]  On [address] [facility]  [phone]    None   None (548) Patient stated that they have no PCP            Information obtained by :  I understand that if any problems occur once I am at home I am to contact my physician and I understand and acknowledge receipt of the instructions indicated above. Physician's or R.N.'s Signature                                                                  Date/Time                                                                                                                                              Patient or Representative Signature                                                          Date/Time

## 2018-04-30 NOTE — DISCHARGE SUMMARY
[facility]  [address]  Tel: [phone]    Physician Discharge Summary    Patient ID:    Becca Holland  Age:              39 y.o.    : 1975  MRN:             [id number]     PCP: None     Admit date: 4/15/2018    Discharge date: 2018    Principal admission Diagnosis:  Cellulitis, Right Lower extremity veinous ulcers    Discharge Diagnoses:  Principal Problem:    Cellulitis (4/15/2018)    Open leg wound (2018)    Chronic cutaneous venous stasis ulcer ([address] (2018)    HTN (hypertension) (2018)    Chronic pain (2018)    Obesity (BMI 30-39.9) (4/15/2018)    Hyperglycemia (4/15/2018)    Tobacco abuse (4/15/2018)    Anemia (2018)    Consults: ID, Nephrology, General Surgery and Shyann Garcia 113[address] Course:     Mr. Chirag Pina is a 39 y.o. admitted to [facility] and treated for the following:    RLE cellulitis / Chronic cutaneous venous stasis ulcer POA: venous doppler US negative for DVT on admission. Seen by vascular surgery this admission. Wound culture with heavy diphtheroids, providencia, heavy strep, beta hemolytic group C and pseudomonas. He had I&D by gen surgery  and  and has completed course with IV Meropenem. Will continue with wound care as an out patient. Chronic pain:  On chronic methadone. Seen and followed by palliative care while here. He was on IV dilaudid which was weaned off. He will continue with methadone, oxycodone for a few days and gabapentin with outpatient follow up for his pain management     KATHARINE/ mild hyponatremia: was likely AIN due to IV antibiotics. Cr improved. Seen by renal. Now much stable. Mild corrected hypercalcemia: Not on admission. Suspect this is from decreased mobility.  Stable       Microscopic hematuria:  No gross blood noted in urinal at bedside.  Resolved by repeat UA.      Mild chronic anemia:  HGB stable this admission.  Folate and B12 WNL.  Iron profile more c/w ACD.     Obesity (BMI 30-39. 9):  Will benefit from weight loss.      Tobacco abuse:  Already advised cessation. Discharge Exam:    Visit Vitals    /81 (BP 1 Location: Right arm, BP Patient Position: At rest;Sitting)    Pulse 82    Temp 98.7 °F (37.1 °C)    Resp 19    Ht 5' 7\" (1.702 m)    Wt 98.2 kg (216 lb 7.9 oz)    SpO2 95%    BMI 33.91 kg/m2      General: well looking and stable patient in no acute distress  Pulm: clear breath sounds without wheezes  Card: no murmurs, normal S1, S2 without thrills, bruits   Abd:    soft, non-tender, normoactive bowel sounds  Neuro: awake, alert and has a non focal     Activity: Activity as tolerated    Diet: Regular Diet    Current Discharge Medication List      START taking these medications    Details   gabapentin (NEURONTIN) 300 mg capsule Take 1 Cap by mouth three (3) times daily for 30 days. Indications: NEUROPATHIC PAIN  Qty: 90 Cap, Refills: 0      oxyCODONE IR (ROXICODONE) 30 mg immediate release tablet Take 1 Tab by mouth every four (4) hours as needed for up to 20 doses. Max Daily Amount: 180 mg.  Qty: 20 Tab, Refills: 0    Associated Diagnoses: Acute leg pain, right      polyethylene glycol (MIRALAX) 17 gram packet Take 1 Packet by mouth daily for 30 days. Qty: 30 Packet, Refills: 0      senna-docusate (PERICOLACE) 8.6-50 mg per tablet Take 1 Tab by mouth nightly for 30 days. Qty: 30 Tab, Refills: 0         CONTINUE these medications which have NOT CHANGED    Details   methadone (DOLOPHINE) 10 mg/mL solution Take 110 mg by mouth daily. aspirin delayed-release 81 mg tablet Take 81 mg by mouth daily. STOP taking these medications       naproxen sodium (ALEVE) 220 mg tablet Comments:   Reason for Stopping:         methadone (DOLOPHINE) 10 mg tablet Comments:   Reason for Stopping:                Follow-up Information     Follow up With Details Comments [facility] On 4/27/2018 [address] [place]  [address]  [address]  [phone]    None   None (145) Patient stated that they have no PCP            Follow-up tests or labs: None    Discharge Condition: Stable    Disposition: home    Time taken to arrange discharge:  35 minutes.     Signed:  Johanny Nathan MD     [facility]  4/30/2018   12:15 PM

## 2018-04-30 NOTE — PALLIATIVE CARE DISCHARGE
Goals of Care/Treatment Preferences    The Palliative Medicine team was consulted as part of your/your loved one's care in the hospital. Our team is a supportive service; we strive to relieve suffering and improve quality of life. You met with Nurse Practitioner Yann Burton. We reviewed advance care planning information, which includes the following:  Patient's [address] is[de-identified] Verbal statement (Legal Next of Kin remains as decision maker)  Primary Decision Maker Name: Griselda Estevez (friend)   Primary Decision Maker Phone Number: [phone]  Primary Decision Maker Relationship to Patient: Friend  Confirm Advance Directive: None    Patient/Health Care Proxy Stated Goals: Cure    We reviewed / discussed your code status as: Full Code     Full Code means perform CPR in the event of cardiac arrest.      DNR means do NOT perform CPR in the event of cardiac arrest.      Partial Code means you have specific preferences, please discuss with your healthcare team.      Gale Mullins means this issue was not addressed / resolved during your stay    Medical Interventions: Full interventions  Artificially Administered Nutrition:  (not addressed)    Because of the importance of this information, we are providing you with a printed copy to share with other healthcare providers after this hospitalization is complete.

## 2018-04-30 NOTE — PROGRESS NOTES
[facility] Progress Note           Amanda Mejía DO    [phone] Office  [phone]  Fax    2018      Assessment & Plan:   1. Right lower extremity cellulitis in the setting of a nonhealing chronic venous stasis ulcers. Cultures growing diptheroids, providencia, klebsiella, pseudomonas, and group c streptococcus. S/p OR debridement  and . Legs examined with surgery today. No signs of infection. WBC normalized. Stop meropenem. No further abx planned. Patient to follow up at [facility] weekly upon discharge  2. KATHARINE. Improving. Suspect related to abx. Stopped vancomycin and pip-tazo. Subjective:     Feeling better    Objective:     Vitals:   Visit Vitals    /81 (BP 1 Location: Right arm, BP Patient Position: At rest;Sitting)    Pulse 82    Temp 98.7 °F (37.1 °C)    Resp 19    Ht 5' 7\" (1.702 m)    Wt 216 lb 7.9 oz (98.2 kg)    SpO2 95%    BMI 33.91 kg/m2        Tmax:  Temp (24hrs), Av.7 °F (37.1 °C), Min:98.1 °F (36.7 °C), Max:99.2 °F (37.3 °C)        Physical Examination:   General:  Alert, cooperative, no distress   Head:  Normocephalic, atraumatic. Eyes:  Conjunctivae clear   Neck: Supple       Lungs:   No distress. Chest wall:     Heart:     Abdomen:      Extremities: Legs seen with surgery. No signs of ongoing infection   Skin:     Neurologic: CNII-XII intact. Labs:        No lab exists for component: ITNL   No results for input(s): CPK, CKMB, TROIQ in the last 72 hours. Recent Labs      18   0540  18   0321   NA  137  133*   K  5.4*  4.6   CL  102  98   CO2  29  25   BUN  20  23*   CREA  1.43*  1.52*   GLU  129*  113*   PHOS   --   4.2   ALB   --   2.9*     No results for input(s): INR, PTP, APTT in the last 72 hours.     No lab exists for component: INREXT, INREXT  Needs: urine analysis, urine sodium, protein and creatinine  Lab Results   Component Value Date/Time    Sodium urine, random 50 2018 01:56 PM Creatinine, urine 82.29 04/19/2018 01:56 PM         Cultures:     No results found for: SDES  Lab Results   Component Value Date/Time    Culture result: HEAVY DIPHTHEROIDS (A) 04/16/2018 12:16 AM    Culture result: HEAVY PROVIDENCIA RETTGERI (A) 04/16/2018 12:16 AM    Culture result: HEAVY PSEUDOMONAS AERUGINOSA (A) 04/16/2018 12:16 AM    Culture result: LIGHT KLEBSIELLA OXYTOCA (A) 04/16/2018 12:16 AM    Culture result: (A) 04/16/2018 12:16 AM     HEAVY STREPTOCOCCI, BETA HEMOLYTIC GROUP C Penicillin and ampicillin are drugs of choice for treatment of beta-hemolytic streptococcal infections. Susceptibility testing of penicillins and beta-lactams approved by the FDA for treatment of beta-hemolytic streptococcal infections need not be performed routinely, because nonsusceptible isolates are extremely rare.  CLSI 2012       Radiology:     Medications       Current Facility-Administered Medications   Medication Dose Route Frequency Last Dose    HYDROmorphone (DILAUDID) injection 1 mg  1 mg IntraMUSCular ONCE      HYDROmorphone (DILAUDID) injection 0.5 mg  0.5 mg IntraVENous Q4H PRN 0.5 mg at 04/30/18 0739    gabapentin (NEURONTIN) capsule 300 mg  300 mg Oral  mg at 04/30/18 0940    prochlorperazine (COMPAZINE) tablet 10 mg  10 mg Oral Q6H PRN      oxyCODONE IR (ROXICODONE) tablet 30 mg  30 mg Oral Q4H PRN 30 mg at 04/30/18 0941    meropenem (MERREM) 500 mg in 0.9% sodium chloride (MBP/ADV) 50 mL MBP  500 mg IntraVENous Q6H 500 mg at 04/30/18 0945    polyethylene glycol (MIRALAX) packet 17 g  17 g Oral DAILY 17 g at 04/30/18 0939    senna-docusate (PERICOLACE) 8.6-50 mg per tablet 1 Tab  1 Tab Oral QHS 1 Tab at 04/22/18 2040    naloxone (NARCAN) injection 0.4 mg  0.4 mg IntraVENous EVERY 2 MINUTES AS NEEDED      methadone (DOLOPHINE) tablet 110 mg  110 mg Oral DAILY 110 mg at 04/30/18 0940    sodium chloride (NS) flush 5-10 mL  5-10 mL IntraVENous Q8H 10 mL at 04/30/18 0300    sodium chloride (NS) flush 5-10 mL  5-10 mL IntraVENous PRN      acetaminophen (TYLENOL) tablet 650 mg  650 mg Oral Q4H  mg at 04/16/18 1957    prochlorperazine (COMPAZINE) injection 10 mg  10 mg IntraVENous Q6H PRN 10 mg at 04/16/18 0636    zolpidem (AMBIEN) tablet 5 mg  5 mg Oral QHS PRN 5 mg at 04/27/18 2114    enoxaparin (LOVENOX) injection 40 mg  40 mg SubCUTAneous Q24H 40 mg at 04/18/18 2006    aluminum-magnesium hydroxide (MAALOX) oral suspension 30 mL  30 mL Oral Q6H PRN 30 mL at 04/16/18 1059           Case discussed with:     Eddie Gutierrez DO

## 2018-04-30 NOTE — PROGRESS NOTES
Problem: Falls - Risk of  Goal: *Absence of Falls  Document Larry Fall Risk and appropriate interventions in the flowsheet.    Outcome: Progressing Towards Goal  Fall Risk Interventions:  Mobility Interventions: Bed/chair exit alarm, Communicate number of staff needed for ambulation/transfer, Patient to call before getting OOB, Utilize walker, cane, or other assitive device         Medication Interventions: Bed/chair exit alarm, Patient to call before getting OOB, Teach patient to arise slowly    Elimination Interventions: Bed/chair exit alarm, Call light in reach, Patient to call for help with toileting needs, Toileting schedule/hourly rounds, Urinal in reach

## 2018-04-30 NOTE — PROGRESS NOTES
Bedside and Verbal shift change report given to Paulo Andres RN (oncoming nurse) by Nathan Coleman (offgoing nurse). Report included the following information SBAR, Kardex, ED Summary, Intake/Output, MAR, Accordion and Recent Results.

## 2018-05-04 ENCOUNTER — HOSPITAL ENCOUNTER (OUTPATIENT)
Dept: WOUND CARE | Age: 43
Discharge: HOME | End: 2018-05-04
Payer: MEDICAID

## 2018-05-04 VITALS — TEMPERATURE: 98.5 F | HEART RATE: 76 BPM

## 2018-05-04 PROBLEM — L97.919 CHRONIC VENOUS HYPERTENSION (IDIOPATHIC) WITH ULCER AND INFLAMMATION OF BILATERAL LOWER EXTREMITY (HCC): Status: ACTIVE | Noted: 2018-05-04

## 2018-05-04 PROBLEM — I87.333 CHRONIC VENOUS HYPERTENSION (IDIOPATHIC) WITH ULCER AND INFLAMMATION OF BILATERAL LOWER EXTREMITY (HCC): Status: ACTIVE | Noted: 2018-05-04

## 2018-05-04 PROBLEM — L97.929 CHRONIC VENOUS HYPERTENSION (IDIOPATHIC) WITH ULCER AND INFLAMMATION OF BILATERAL LOWER EXTREMITY (HCC): Status: ACTIVE | Noted: 2018-05-04

## 2018-05-04 PROBLEM — L03.115 CELLULITIS OF RIGHT LOWER EXTREMITY: Status: ACTIVE | Noted: 2018-05-04

## 2018-05-04 PROCEDURE — 99213 OFFICE O/P EST LOW 20 MIN: CPT

## 2018-05-04 PROCEDURE — 11045 DBRDMT SUBQ TISS EACH ADDL: CPT

## 2018-05-04 PROCEDURE — 11042 DBRDMT SUBQ TIS 1ST 20SQCM/<: CPT

## 2018-05-04 NOTE — WOUND CARE
05/04/18 0915   Wound Leg Lower Right   Date First Assessed/Time First Assessed: 01/14/18 0900   POA: Yes  Wound Type: Other (comment); Blister/bullae  Location: Leg Lower  Wound Description (Optional): multiple lesions (appear like venous stasis) to right lower extremity  Orientation: Right   DRESSING STATUS Clean, dry, and intact   DRESSING TYPE Compression Wrap/Venous Stasis   Wound Length (cm) 18.5 cm   Wound Width (cm) 12.5 cm   Wound Depth (cm) 0.2   Wound Surface area (cm^2) 231.25 cm^2   Wound Leg Left   Date First Assessed/Time First Assessed: 04/16/18 0242   POA: Yes  Wound Type: Venous  Location: Leg  Orientation: Left   DRESSING STATUS Clean   DRESSING TYPE Compression dressing   Wound Length (cm) 14 cm   Wound Width (cm) 15.5 cm   Wound Depth (cm) 0.2   Wound Surface area (cm^2) 217 cm^2

## 2018-05-04 NOTE — PROGRESS NOTES
Chief Complaint (CC): non healing wounds Nate legs. Present Illness (PI): long known history of Methadone addiction in clinic also has leg swelling and ulceration, getting worse over time, painful R>L with ulceration, drainage and maceration R>L. Pertinent Past History (PMedHx): Laurenana Nuñezkeren Medications and Allergies: as per [address] I have reviewed and concur. Illnesses: as per '[facility]' recorded data noted today. Surgeries and Injuries: as per '[facility]' recorded data noted today. Review of Systems (ROS):                        Integumentary: Other than as noted in 'PI'; skin hair and nails normal for age, with no new rash, lumps, bumps, eruptions or bleeding. Lymph: no new prominent nodes or drainage near lymph nodes. Bones, Joints, and Muscles: Other than as noted in 'PI' no new fractures, dislocations, weakness or pain. .                        Hematopoietic: no new bleeding or bruising or anemia changes. .                        Eyes: no recent trauma or inflammation. no. Eye glasses. no. Intra Occular Lens Implants (IOLI)                        Ears: Hearing is unchanged and usually good. Nose: no new drainage, rhinorhea or epistaxis. Mouth, and throat: no soreness, drainage or lesions. no. Dentures. Neck: no new masses, drainage or pain                        Respiratory; no hemoptysis, current shortness of breath or pain with breath. Cardiovascular: No chest pain, palpitation or tachycardia. .                        Gastrointestinal: no recent change in appetite, stools or food tolerance.  No jaundice, hematemesis, vomiting or diarrhea                        Genito-Urinary: urine color, frequency, sensation unchanged                        Neurologic: no syncope, dizzyness or unusual sensations. Psychologic and Mental Status: no change in mood, sleep or memory recently     Social History: '[facility]' data today is noted. Lives at home, taking meds, self pay at Methadone clinic. Family History: '[facility]' data today is noted. Marisol Franco Physical Exam:      General:  alert, sullen but responsive. Head, Eyes, Ears, Nose and Throat: normocephalic, PERRLA EOMI, mucosa oral atrophic, dental intact. Neck: supple without masses or adenopathy. No  bruit. Chest: full excursion without deformity, . Lungs: clear to ausc. Heart: RSR, no M. Abdomen: soft round benign. Vascular:                         Pulses:                                            R                    L                                               Radial                        +.                 +.                                              Femoral                     +.                 +.                                              DP                             +.                 +.                                              PT                             +.                 +.  Extremities: BLEs with 2+ pitting, R with erythema and  Multiple ulcerations with exudate and slough over all, scaling areas of maceration and tender over the reddened area. L with similar but almost no erythema much less ulceration, same slough and exudate. Other: Marisol Loose Vital signs and data recorded in '[facility]' for this patient today is noted, reviewed and considered. Patient notes today: as above . Procedure Note     Name of Procedure: sharp debridement. PreOp diagnosis: stasis dermatitis, cellulitis. .  Anaesthesia: Lidocaine; topical   Description: using as sharp curette I removed some of the adherent slough and exudate to effect a clean bleeding base in many areas R>>L.   Blood Loss: 2. CCs  Post Op Diagnosis, and condition: as above, difficult to approach with patient writhing during any treatment. Follow Up Plan: return DIANE harris, My visit 1 week. Mupirocin, Dakon's, aquacel. Benna Him Specimens: 0. Counseled regarding/Discussed: as above, long discussion regarding arranging Methadone clinic visit prior to  [facility] visit each time. Clinical considerations: as above, concern for infection control. Future: with attention to care this should heal. .

## 2018-05-10 ENCOUNTER — TELEPHONE (OUTPATIENT)
Dept: WOUND CARE | Age: 43
End: 2018-05-10

## 2018-05-10 NOTE — TELEPHONE ENCOUNTER
Called patient to remind of appointment with Dr. Mady Murphy on 05/11/18, left message on answer machine.  BAKARI Armas

## 2018-05-11 ENCOUNTER — HOSPITAL ENCOUNTER (OUTPATIENT)
Dept: WOUND CARE | Age: 43
Discharge: HOME | End: 2018-05-11
Payer: MEDICAID

## 2018-05-11 VITALS
SYSTOLIC BLOOD PRESSURE: 151 MMHG | TEMPERATURE: 98.7 F | DIASTOLIC BLOOD PRESSURE: 91 MMHG | RESPIRATION RATE: 16 BRPM | HEART RATE: 97 BPM

## 2018-05-11 PROCEDURE — 11045 DBRDMT SUBQ TISS EACH ADDL: CPT

## 2018-05-11 PROCEDURE — 11042 DBRDMT SUBQ TIS 1ST 20SQCM/<: CPT

## 2018-05-11 PROCEDURE — 74011000250 HC RX REV CODE- 250: Performed by: EMERGENCY MEDICINE

## 2018-05-11 RX ORDER — LIDOCAINE 40 MG/G
CREAM TOPICAL
Status: COMPLETED | OUTPATIENT
Start: 2018-05-11 | End: 2018-05-11

## 2018-05-11 RX ADMIN — LIDOCAINE: 40 CREAM TOPICAL at 11:52

## 2018-05-11 NOTE — PROGRESS NOTES
I have noted, and reviewed today's data for this patient in [facility] and concur with same. The focused physical exam, other physical findings, Medical history, Review of Symptoms and Medications today remains unchanged except as noted below. Patient notes today: I'm Ok, I missed a methadone clinic visit and they threw me out, no methadone for a week. I couldn't get the dressing done here midweek so I did it myself with Xeroform. .  Lesion/Wound, focused exam on Presentation today: BLEs, ulcers with much crusting, dry scaling skin with 1+ pitting now and minimal weeping. Procedure:     Wound # BLEs, stasis ulcers. Procedure name: sharp debridement. Anaesthesia: Lidocaine; topical    Description: using a sharp curette I removed adherent exudate, dry crusting where possible and debris over all ulcerated wounds to effect a clean bleeding base. .  Blood Loss: 2 CCs  Post Procedure Condition/ Diagnosis: much improved, Ok to do dressings as per this last week. .  Follow up and Future plans today: return 1 week, double tubi aquacel mid week dressing. Specimens: 0. Patient Counseled regarding/Discussed: good progress so far. Clinical Considerations: watch for soil, neglect, pressure, edema control have discussed .

## 2018-05-11 NOTE — WOUND CARE
05/11/18 1204   Wound Leg Left   Date First Assessed/Time First Assessed: 04/16/18 0242   POA: Yes  Wound Type: Venous  Location: Leg  Orientation: Left   DRESSING STATUS Old drainage   DRESSING TYPE Gauze wrap (anais);Gauze; Xeroform   Wound Length (cm) 14 cm   Wound Width (cm) 15.5 cm   Wound Depth (cm) 1   Wound Surface area (cm^2) 217 cm^2   Change in Wound Size % 0   Cleansing and Cleansing Agents  Normal saline   Wound Leg Lower Right   Date First Assessed/Time First Assessed: 01/14/18 0900   POA: Yes  Wound Type: Other (comment); Blister/bullae  Location: Leg Lower  Wound Description (Optional): multiple lesions (appear like venous stasis) to right lower extremity  Orientation: Right   DRESSING STATUS Clean, dry, and intact   DRESSING TYPE Gauze wrap (anais);Gauze; Xeroform   Wound Length (cm) 18.5 cm   Wound Width (cm) 13 cm   Wound Depth (cm) 0.1   Wound Surface area (cm^2) 240.5 cm^2   Change in Wound Size % -4   Cleansing and Cleansing Agents  Normal saline

## 2018-05-14 NOTE — OP NOTES
[facility] 78  OPERATIVE REPORT    Seth Martinez  MR#: [id number]  : 1975  ACCOUNT #: [de-identified]   DATE OF SERVICE: 2018    PREOPERATIVE DIAGNOSIS:  Right lower extremity venous ulcers. POSTOPERATIVE DIAGNOSIS:  Bilateral lower extremity venous ulcers. PROCEDURE PERFORMED:  Nonexcisional/Mechanical debridement of bilateral leg wounds. SURGEON:  Geovanna Martell MD     ASSISTANT:  See record    ANESTHESIA:  General.    ESTIMATED BLOOD LOSS:  Minimal.      SPECIMENS REMOVED:  No specimens. FINDINGS:  Bilateral full thickness lateral and medial malleolus wounds, breakdown to subcutaneous tissue superior to malleolus. COMPLICATIONS:  None. IMPLANTS:  None. INDICATIONS:  The patient is a 71-year-old male who was admitted to the hospital on 04/15/2018 with lower extremity cellulitis and infected ulcers. He reports having these ulcers for the past 6-7 months. Lower extremity swelling has worsened and has been accompanied by skin breakdown and increased weeping. His leg is also extremely tender. After consent was obtained, the patient was taken to the operating room and placed in supine position. No SCDs were turned on. He was given an upper extremity tourniquet due to the fact that we were going to be operating on his lower extremities. General anesthesia was instituted successfully. Preincision timeout was performed per protocol. The patient is on therapeutic antibiotics and does not require preincisional antibiotics. The debridement was not excisional.  His lower extremity wounds were debrided with a scrub brush to mechanically clean and slough any dead skin and fibrinous exudate from the exposed subcutaneous and weeping dermis. The skin of his lower extremity was extremely excoriated.   There was an [place] on the right lateral leg of approximately 20 x 5 cm where the dermis had been completely debrided down to subcutaneous tissue prior to this procedure. There was exposed subcutaneous tissue in that [address] as well as a [address] of this same appearance was on the left lateral leg but less so, approximately 10 x 2 cm. Dorsum of the foot had patchy islands of this same exposed subcutaneous tissue, approximately 3 islands of about 2 x 3 cm. These areas where subcutaneous tissue was exposed were very shallow and only approximately 3 or 4 mm deep. There was no evidence of any new growth or healthy bleeding tissue. The debridement went from the tibial tuberosity all the way down to his feet, and this was done with just basically 2% chlorhexidine soap and a scrub brush. Once cleaned completely, all the slough in the base of the subcutaneous wound was completely clean. Bleeding appeared healthy. The Doppler was brought in to assess for dopplerable toe signals which were present in all 5 toes of both feet. Once cleaned, the wounds were then dressed with copious amount of silver sulfadiazine, approximately dime-size amounts over the exposed subcutaneous tissue. The excoriated skin and dermis were covered with nonocclusive Adaptic dressings. The legs were then wrapped with Kerlix from the feet up to the tibial tuberosity followed by 4-inch Ace from the feet to the tibial tuberosity to encourage venous return. The patient tolerated the procedure well and went to the PACU in stable condition. Discussed the findings of the case with the patient when he awoke. He did not want me to call anyone.       MD Minh Wilkins /   D: 05/14/2018 09:49     T: 05/14/2018 12:05  JOB #: [id number]

## 2018-05-14 NOTE — OP NOTES
[facility] 78  OPERATIVE REPORT    Jayne Davalos  MR#: [id number]  : 1975  ACCOUNT #: [de-identified]   DATE OF SERVICE: 2018    PREOPERATIVE DIAGNOSIS:  Bilateral lower extremity venous stasis ulcers with exposed subcutaneous tissue. POSTOPERATIVE DIAGNOSIS:  Bilateral lower extremity venous stasis ulcers with exposed subcutaneous tissue. PROCEDURE PERFORMED:  Mechanical debridement of bilateral lower extremity venous stasis ulcers with placement of bilateral Unna boots. SURGEON:  Jesus Agosto    ASSISTANT:  See Record    ANESTHESIA:  MAC.    ESTIMATED BLOOD LOSS:  Minimal.    SPECIMENS REMOVED:  none    FINDINGS:  Full thickness partial skin loss with exposed subcutaneous tissue on bilateral lower extremities with improved lower extremity edema. COMPLICATIONS:  None. IMPLANTS:  None. INDICATION:  The patient is a 41-year-old male who underwent mechanical nonexcisional debridement of bilateral lower extremities and placement of compression therapy on 2018 with improvement of appearance of the lower extremity edema as well as the nonviable tissue along bilateral lower extremities with nonexcisional debridement. The patient was taken to the operating room today for reevaluation of his lower extremities and placement of an Unna boot for continued care. PROCEDURE:  After consent was obtained, the patient was taken to the operating room and placed in supine position. Monitored anesthesia care was instituted successfully. Preincision antibiotics were not needed because the patient is already on therapeutic antibiotics. Preprocedure timeout was performed per protocol. The patient's legs were mechanically debrided nonexcisionally once again using same technique as on the  with a 2% chlorhexidine scrub brush. The wounds were much . Lower extremity was less edematous.   The exposed subcutaneous tissue on the right leg, left leg and the bilateral dorsum of the feet were approximately the same size. There was no fibrinous slough on any of these and the weeping of his lower extremities was much less. The exposed subcutaneous tissue on this debridement was covered with multiple SilvaSorb sheets, which are silver antimicrobial wound dressings, to promote antimicrobial care as well as continue to clean the legs. Once the sheets were placed, the leg was wrapped with Unna boots from the toes to the tibial tuberosity. This was followed by very gentle placement of Coban dressings to both legs. The patient tolerated the procedure well and was delivered to PACU in stable condition. We will discuss the findings of the case with the patient after the operation. He did not want us to call anybody.       MD Real Vyas /   D: 05/14/2018 09:58     T: 05/14/2018 11:39  JOB #: [id number]

## 2018-05-18 ENCOUNTER — HOSPITAL ENCOUNTER (OUTPATIENT)
Dept: WOUND CARE | Age: 43
Discharge: HOME | End: 2018-05-18
Payer: MEDICAID

## 2018-05-18 VITALS
RESPIRATION RATE: 16 BRPM | DIASTOLIC BLOOD PRESSURE: 97 MMHG | HEART RATE: 101 BPM | SYSTOLIC BLOOD PRESSURE: 153 MMHG | TEMPERATURE: 98 F

## 2018-05-18 PROCEDURE — 74011000250 HC RX REV CODE- 250: Performed by: EMERGENCY MEDICINE

## 2018-05-18 PROCEDURE — 11042 DBRDMT SUBQ TIS 1ST 20SQCM/<: CPT

## 2018-05-18 PROCEDURE — 11045 DBRDMT SUBQ TISS EACH ADDL: CPT

## 2018-05-18 RX ORDER — LIDOCAINE 40 MG/G
CREAM TOPICAL ONCE
Status: COMPLETED | OUTPATIENT
Start: 2018-05-18 | End: 2018-05-18

## 2018-05-18 RX ORDER — CHOLECALCIFEROL (VITAMIN D3) 125 MCG
220 CAPSULE ORAL 2 TIMES DAILY
COMMUNITY
End: 2018-05-22

## 2018-05-18 RX ADMIN — LIDOCAINE: 4 CREAM TOPICAL at 11:05

## 2018-05-21 ENCOUNTER — HOSPITAL ENCOUNTER (EMERGENCY)
Age: 43
Discharge: HOME | End: 2018-05-22
Attending: EMERGENCY MEDICINE
Payer: MEDICAID

## 2018-05-21 DIAGNOSIS — R07.9 ACUTE CHEST PAIN: Primary | ICD-10-CM

## 2018-05-21 DIAGNOSIS — K29.90 GASTRITIS AND DUODENITIS: ICD-10-CM

## 2018-05-21 PROCEDURE — 96374 THER/PROPH/DIAG INJ IV PUSH: CPT

## 2018-05-21 PROCEDURE — 99285 EMERGENCY DEPT VISIT HI MDM: CPT

## 2018-05-22 ENCOUNTER — APPOINTMENT (OUTPATIENT)
Dept: GENERAL RADIOLOGY | Age: 43
End: 2018-05-22
Attending: EMERGENCY MEDICINE
Payer: MEDICAID

## 2018-05-22 VITALS
HEIGHT: 67 IN | BODY MASS INDEX: 32.18 KG/M2 | SYSTOLIC BLOOD PRESSURE: 127 MMHG | OXYGEN SATURATION: 98 % | HEART RATE: 85 BPM | WEIGHT: 205 LBS | TEMPERATURE: 98.6 F | RESPIRATION RATE: 18 BRPM | DIASTOLIC BLOOD PRESSURE: 70 MMHG

## 2018-05-22 LAB
ALBUMIN SERPL-MCNC: 3.6 G/DL (ref 3.5–5)
ALBUMIN/GLOB SERPL: 0.9 {RATIO} (ref 1.1–2.2)
ALP SERPL-CCNC: 103 U/L (ref 45–117)
ALT SERPL-CCNC: 39 U/L (ref 12–78)
ANION GAP SERPL CALC-SCNC: 7 MMOL/L (ref 5–15)
AST SERPL-CCNC: 15 U/L (ref 15–37)
ATRIAL RATE: 77 BPM
BASOPHILS # BLD: 0.1 K/UL (ref 0–0.1)
BASOPHILS NFR BLD: 0 % (ref 0–1)
BILIRUB SERPL-MCNC: 0.1 MG/DL (ref 0.2–1)
BUN SERPL-MCNC: 10 MG/DL (ref 6–20)
BUN/CREAT SERPL: 9 (ref 12–20)
CALCIUM SERPL-MCNC: 9.4 MG/DL (ref 8.5–10.1)
CALCULATED P AXIS, ECG09: 1 DEGREES
CALCULATED R AXIS, ECG10: 63 DEGREES
CALCULATED T AXIS, ECG11: 48 DEGREES
CHLORIDE SERPL-SCNC: 104 MMOL/L (ref 97–108)
CO2 SERPL-SCNC: 26 MMOL/L (ref 21–32)
CREAT SERPL-MCNC: 1.11 MG/DL (ref 0.7–1.3)
DIAGNOSIS, 93000: NORMAL
DIFFERENTIAL METHOD BLD: ABNORMAL
EOSINOPHIL # BLD: 0.4 K/UL (ref 0–0.4)
EOSINOPHIL NFR BLD: 3 % (ref 0–7)
ERYTHROCYTE [DISTWIDTH] IN BLOOD BY AUTOMATED COUNT: 15.9 % (ref 11.5–14.5)
GLOBULIN SER CALC-MCNC: 4.2 G/DL (ref 2–4)
GLUCOSE SERPL-MCNC: 138 MG/DL (ref 65–100)
HCT VFR BLD AUTO: 39.9 % (ref 36.6–50.3)
HGB BLD-MCNC: 12.9 G/DL (ref 12.1–17)
IMM GRANULOCYTES # BLD: 0.1 K/UL (ref 0–0.04)
IMM GRANULOCYTES NFR BLD AUTO: 1 % (ref 0–0.5)
LIPASE SERPL-CCNC: 193 U/L (ref 73–393)
LYMPHOCYTES # BLD: 3.3 K/UL (ref 0.8–3.5)
LYMPHOCYTES NFR BLD: 25 % (ref 12–49)
MCH RBC QN AUTO: 26.4 PG (ref 26–34)
MCHC RBC AUTO-ENTMCNC: 32.3 G/DL (ref 30–36.5)
MCV RBC AUTO: 81.8 FL (ref 80–99)
MONOCYTES # BLD: 0.7 K/UL (ref 0–1)
MONOCYTES NFR BLD: 5 % (ref 5–13)
NEUTS SEG # BLD: 8.6 K/UL (ref 1.8–8)
NEUTS SEG NFR BLD: 66 % (ref 32–75)
NRBC # BLD: 0 K/UL (ref 0–0.01)
NRBC BLD-RTO: 0 PER 100 WBC
P-R INTERVAL, ECG05: 116 MS
PLATELET # BLD AUTO: 259 K/UL (ref 150–400)
PMV BLD AUTO: 8.3 FL (ref 8.9–12.9)
POTASSIUM SERPL-SCNC: 3.9 MMOL/L (ref 3.5–5.1)
PROT SERPL-MCNC: 7.8 G/DL (ref 6.4–8.2)
Q-T INTERVAL, ECG07: 382 MS
QRS DURATION, ECG06: 92 MS
QTC CALCULATION (BEZET), ECG08: 432 MS
RBC # BLD AUTO: 4.88 M/UL (ref 4.1–5.7)
SODIUM SERPL-SCNC: 137 MMOL/L (ref 136–145)
TROPONIN I SERPL-MCNC: <0.04 NG/ML
TROPONIN I SERPL-MCNC: <0.04 NG/ML
VENTRICULAR RATE, ECG03: 77 BPM
WBC # BLD AUTO: 13.1 K/UL (ref 4.1–11.1)

## 2018-05-22 PROCEDURE — 71045 X-RAY EXAM CHEST 1 VIEW: CPT

## 2018-05-22 PROCEDURE — 36415 COLL VENOUS BLD VENIPUNCTURE: CPT | Performed by: EMERGENCY MEDICINE

## 2018-05-22 PROCEDURE — 74011250637 HC RX REV CODE- 250/637: Performed by: EMERGENCY MEDICINE

## 2018-05-22 PROCEDURE — 85025 COMPLETE CBC W/AUTO DIFF WBC: CPT | Performed by: EMERGENCY MEDICINE

## 2018-05-22 PROCEDURE — 93005 ELECTROCARDIOGRAM TRACING: CPT

## 2018-05-22 PROCEDURE — 84484 ASSAY OF TROPONIN QUANT: CPT | Performed by: EMERGENCY MEDICINE

## 2018-05-22 PROCEDURE — 83690 ASSAY OF LIPASE: CPT | Performed by: EMERGENCY MEDICINE

## 2018-05-22 PROCEDURE — 80053 COMPREHEN METABOLIC PANEL: CPT | Performed by: EMERGENCY MEDICINE

## 2018-05-22 PROCEDURE — 74011000250 HC RX REV CODE- 250: Performed by: EMERGENCY MEDICINE

## 2018-05-22 RX ORDER — SODIUM CHLORIDE 0.9 % (FLUSH) 0.9 %
5-10 SYRINGE (ML) INJECTION EVERY 8 HOURS
Status: DISCONTINUED | OUTPATIENT
Start: 2018-05-22 | End: 2018-05-22 | Stop reason: HOSPADM

## 2018-05-22 RX ORDER — PANTOPRAZOLE SODIUM 40 MG/1
40 TABLET, DELAYED RELEASE ORAL DAILY
Qty: 30 TAB | Refills: 0 | Status: SHIPPED | OUTPATIENT
Start: 2018-05-22 | End: 2018-06-21

## 2018-05-22 RX ORDER — SODIUM CHLORIDE 0.9 % (FLUSH) 0.9 %
5-10 SYRINGE (ML) INJECTION AS NEEDED
Status: DISCONTINUED | OUTPATIENT
Start: 2018-05-22 | End: 2018-05-22 | Stop reason: HOSPADM

## 2018-05-22 RX ORDER — FAMOTIDINE 10 MG/ML
20 INJECTION INTRAVENOUS
Status: COMPLETED | OUTPATIENT
Start: 2018-05-22 | End: 2018-05-22

## 2018-05-22 RX ORDER — GUAIFENESIN 100 MG/5ML
324 LIQUID (ML) ORAL
Status: COMPLETED | OUTPATIENT
Start: 2018-05-22 | End: 2018-05-22

## 2018-05-22 RX ORDER — CLOPIDOGREL BISULFATE 75 MG/1
75 TABLET ORAL DAILY
Qty: 30 TAB | Refills: 0 | Status: SHIPPED | OUTPATIENT
Start: 2018-05-22 | End: 2018-06-21

## 2018-05-22 RX ADMIN — LIDOCAINE HYDROCHLORIDE 40 ML: 20 SOLUTION ORAL; TOPICAL at 00:36

## 2018-05-22 RX ADMIN — ASPIRIN 81 MG 324 MG: 81 TABLET ORAL at 00:35

## 2018-05-22 RX ADMIN — Medication 10 ML: at 00:37

## 2018-05-22 RX ADMIN — FAMOTIDINE 20 MG: 10 INJECTION INTRAVENOUS at 00:37

## 2018-05-22 NOTE — ED PROVIDER NOTES
HPI Comments: 39 y.o. male with past medical history significant for venous stasis ulcers and coronary stent placement who presents from home  with chief complaint of CP. The pt reports that he started to have CP with mild nausea 4 hours PTA. The pt took 81 mg ASA. The pt received 3 nitroglycerin en route. The pt had a stent placed 1.5 years ago. The pt did not have his 6 month F/U. The pt has not been taking his Plavix because \"I don't have a PCP\". The pt denies diaphoresis. There are no other acute medical concerns at this time. Social hx: current smoker, no EtOH use  PCP: None    Note written by Katya Jay, as dictated by Paco Amos,  12:12 AM      The history is provided by the patient. No  was used. Past Medical History:   Diagnosis Date    Ill-defined condition     venous stasis ulcers    Obesity (BMI 30-39.9) 4/15/2018       Past Surgical History:   Procedure Laterality Date    HX CORONARY STENT PLACEMENT           Family History:   Problem Relation Age of Onset    Family history unknown: Yes       Social History     Social History    Marital status: SINGLE     Spouse name: N/A    Number of children: N/A    Years of education: N/A     Occupational History    Not on file. Social History Main Topics    Smoking status: Current Every Day Smoker     Packs/day: 1.00    Smokeless tobacco: Never Used    Alcohol use No    Drug use: No    Sexual activity: Not on file     Other Topics Concern    Not on file     Social History Narrative     ALLERGIES: Tylenol [acetaminophen]    Review of Systems   Constitutional: Negative for appetite change, chills, diaphoresis, fever and unexpected weight change. HENT: Negative for ear pain, hearing loss, rhinorrhea and trouble swallowing. Eyes: Negative for pain and visual disturbance. Respiratory: Negative for cough, chest tightness and shortness of breath. Cardiovascular: Positive for chest pain. Negative for palpitations. Gastrointestinal: Positive for nausea. Negative for abdominal distention, abdominal pain, blood in stool and vomiting. Genitourinary: Negative for dysuria, hematuria and urgency. Musculoskeletal: Negative for back pain and myalgias. Skin: Negative for rash. Neurological: Negative for dizziness, syncope, weakness and numbness. Psychiatric/Behavioral: Negative for confusion and suicidal ideas. All other systems reviewed and are negative. Vitals:    05/22/18 0001   BP: (!) 159/108   Pulse: 77   Resp: 20   Temp: 98.6 °F (37 °C)   SpO2: 100%   Weight: 93 kg (205 lb)   Height: 5' 7\" (1.702 m)            Physical Exam   Constitutional: He is oriented to person, place, and time. He appears well-developed and well-nourished. No distress. HENT:   Head: Normocephalic and atraumatic. Right Ear: External ear normal.   Left Ear: External ear normal.   Nose: Nose normal.   Mouth/Throat: Oropharynx is clear and moist. No oropharyngeal exudate. Eyes: Conjunctivae and EOM are normal. Pupils are equal, round, and reactive to light. Right eye exhibits no discharge. Left eye exhibits no discharge. No scleral icterus. Neck: Normal range of motion. Neck supple. No JVD present. No tracheal deviation present. Cardiovascular: Normal rate, regular rhythm, normal heart sounds and intact distal pulses. Exam reveals no gallop and no friction rub. No murmur heard. Pulmonary/Chest: Effort normal and breath sounds normal. No stridor. No respiratory distress. He has no decreased breath sounds. He has no wheezes. He has no rhonchi. He has no rales. He exhibits no tenderness. Abdominal: Soft. Bowel sounds are normal. He exhibits no distension. There is tenderness (epigastric). There is no rebound and no guarding. Musculoskeletal: Normal range of motion. He exhibits no edema or tenderness. Neurological: He is alert and oriented to person, place, and time.  He has normal strength and normal reflexes. No cranial nerve deficit or sensory deficit. He exhibits normal muscle tone. Coordination normal. GCS eye subscore is 4. GCS verbal subscore is 5. GCS motor subscore is 6. Skin: Skin is warm and dry. No rash noted. He is not diaphoretic. No erythema. No pallor. Psychiatric: He has a normal mood and affect. His behavior is normal. Judgment and thought content normal.   Nursing note and vitals reviewed. Note written by Katya Waldron, as dictated by David Saavedra DO 12:13 AM      MDM  Number of Diagnoses or Management Options  Acute chest pain:   Gastritis and duodenitis:      Amount and/or Complexity of Data Reviewed  Clinical lab tests: ordered and reviewed  Tests in the radiology section of CPT®: ordered and reviewed  Tests in the medicine section of CPT®: ordered and reviewed  Independent visualization of images, tracings, or specimens: yes (ekg)    Risk of Complications, Morbidity, and/or Mortality  Presenting problems: moderate  Diagnostic procedures: low  Management options: moderate    Patient Progress  Patient progress: stable        ED Course       Procedures    Chief Complaint   Patient presents with    Chest Pain       The patient's presenting problems have been discussed, and they are in agreement with the care plan formulated and outlined with them. I have encouraged them to ask questions as they arise throughout their visit.     MEDICATIONS GIVEN:  Medications   sodium chloride (NS) flush 5-10 mL (10 mL IntraVENous Given 5/22/18 0037)   sodium chloride (NS) flush 5-10 mL (not administered)   mylanta/viscous lidocaine (PHIL)(GI COCKTAIL) (40 mL Oral Given 5/22/18 0036)   aspirin chewable tablet 324 mg (324 mg Oral Given 5/22/18 0035)   famotidine (PF) (PEPCID) injection 20 mg (20 mg IntraVENous Given 5/22/18 0037)       LABS REVIEWED:  Recent Results (from the past 24 hour(s))   TROPONIN I    Collection Time: 05/22/18 12:07 AM   Result Value Ref Range Troponin-I, Qt. <0.04 <0.05 ng/mL   CBC WITH AUTOMATED DIFF    Collection Time: 05/22/18 12:07 AM   Result Value Ref Range    WBC 13.1 (H) 4.1 - 11.1 K/uL    RBC 4.88 4.10 - 5.70 M/uL    HGB 12.9 12.1 - 17.0 g/dL    HCT 39.9 36.6 - 50.3 %    MCV 81.8 80.0 - 99.0 FL    MCH 26.4 26.0 - 34.0 PG    MCHC 32.3 30.0 - 36.5 g/dL    RDW 15.9 (H) 11.5 - 14.5 %    PLATELET 376 639 - 143 K/uL    MPV 8.3 (L) 8.9 - 12.9 FL    NRBC 0.0 0  WBC    ABSOLUTE NRBC 0.00 0.00 - 0.01 K/uL    NEUTROPHILS 66 32 - 75 %    LYMPHOCYTES 25 12 - 49 %    MONOCYTES 5 5 - 13 %    EOSINOPHILS 3 0 - 7 %    BASOPHILS 0 0 - 1 %    IMMATURE GRANULOCYTES 1 (H) 0.0 - 0.5 %    ABS. NEUTROPHILS 8.6 (H) 1.8 - 8.0 K/UL    ABS. LYMPHOCYTES 3.3 0.8 - 3.5 K/UL    ABS. MONOCYTES 0.7 0.0 - 1.0 K/UL    ABS. EOSINOPHILS 0.4 0.0 - 0.4 K/UL    ABS. BASOPHILS 0.1 0.0 - 0.1 K/UL    ABS. IMM. GRANS. 0.1 (H) 0.00 - 0.04 K/UL    DF AUTOMATED     METABOLIC PANEL, COMPREHENSIVE    Collection Time: 05/22/18 12:07 AM   Result Value Ref Range    Sodium 137 136 - 145 mmol/L    Potassium 3.9 3.5 - 5.1 mmol/L    Chloride 104 97 - 108 mmol/L    CO2 26 21 - 32 mmol/L    Anion gap 7 5 - 15 mmol/L    Glucose 138 (H) 65 - 100 mg/dL    BUN 10 6 - 20 MG/DL    Creatinine 1.11 0.70 - 1.30 MG/DL    BUN/Creatinine ratio 9 (L) 12 - 20      GFR est AA >60 >60 ml/min/1.73m2    GFR est non-AA >60 >60 ml/min/1.73m2    Calcium 9.4 8.5 - 10.1 MG/DL    Bilirubin, total 0.1 (L) 0.2 - 1.0 MG/DL    ALT (SGPT) 39 12 - 78 U/L    AST (SGOT) 15 15 - 37 U/L    Alk.  phosphatase 103 45 - 117 U/L    Protein, total 7.8 6.4 - 8.2 g/dL    Albumin 3.6 3.5 - 5.0 g/dL    Globulin 4.2 (H) 2.0 - 4.0 g/dL    A-G Ratio 0.9 (L) 1.1 - 2.2     LIPASE    Collection Time: 05/22/18 12:07 AM   Result Value Ref Range    Lipase 193 73 - 393 U/L   TROPONIN I    Collection Time: 05/22/18  2:02 AM   Result Value Ref Range    Troponin-I, Qt. <0.04 <0.05 ng/mL       VITAL SIGNS:  Patient Vitals for the past 12 hrs:   Temp Pulse Resp BP SpO2   05/22/18 0230 - 85 18 127/70 98 %   05/22/18 0215 - 84 22 143/82 99 %   05/22/18 0100 - 85 15 153/80 98 %   05/22/18 0030 - 79 16 (!) 173/98 99 %   05/22/18 0015 - 68 15 (!) 170/92 99 %   05/22/18 0001 98.6 °F (37 °C) 77 20 (!) 159/108 100 %       RADIOLOGY RESULTS:  The following have been ordered and reviewed:  Xr Chest Port    Result Date: 5/22/2018  EXAM: Portable CXR.  0014 hours  INDICATION: chest pain The lungs are clear. Heart is normal in size. There is no overt pulmonary edema. There is no evident pneumothorax, adenopathy or pleural effusion. IMPRESSION: No Acute Disease. ED EKG interpretation:  Rhythm: normal sinus rhythm; and regular . Rate (approx.): 77; Axis: normal; P wave: normal; QRS interval: normal ; ST/T wave: normal; Other findings: normal. This EKG was interpreted by Kushal Vo DO, ED Provider. PROGRESS NOTES:  Second trop is negative. Felt that this is more GI related. Will restart plavix due to stent placement. Discussed results and plan with patient. Patient will be discharged home with PCP and cardiology followup. Patient instructed to return to the emergency room for any worsening symptoms or any other concerns. DIAGNOSIS:    1. Acute chest pain    2. Gastritis and duodenitis        PLAN:  Follow-up Information     Follow up With Details Comments [address] Schedule an appointment as soon as possible for a visit  [address]    Shikha Hills MD Schedule an appointment as soon as possible for a visit  [address] [place] [id number]  [phone]      [facility] EMERGENCY DEPT  If symptoms worsen [address] [facility]  [phone]        Discharge Medication List as of 5/22/2018  2:29 AM      START taking these medications    Details   clopidogrel (PLAVIX) 75 mg tab Take 1 Tab by mouth daily for 30 days. , Print, Disp-30 Tab, R-0      pantoprazole (PROTONIX) 40 mg tablet Take 1 Tab by mouth daily for 30 days. , Print, Disp-30 Tab, R-0         CONTINUE these medications which have NOT CHANGED    Details   gabapentin (NEURONTIN) 300 mg capsule Take 1 Cap by mouth three (3) times daily for 30 days. Indications: NEUROPATHIC PAIN, Print, Disp-90 Cap, R-0      oxyCODONE IR (ROXICODONE) 30 mg immediate release tablet Take 1 Tab by mouth every four (4) hours as needed for up to 20 doses. Max Daily Amount: 180 mg., Print, Disp-20 Tab, R-0      polyethylene glycol (MIRALAX) 17 gram packet Take 1 Packet by mouth daily for 30 days. , Print, Disp-30 Packet, R-0      senna-docusate (PERICOLACE) 8.6-50 mg per tablet Take 1 Tab by mouth nightly for 30 days. , Print, Disp-30 Tab, R-0      methadone (DOLOPHINE) 10 mg/mL solution Take 110 mg by mouth daily. , Historical Med      aspirin delayed-release 81 mg tablet Take 81 mg by mouth daily. , Historical Med         STOP taking these medications       naproxen sodium (ALEVE) 220 mg cap Comments:   Reason for Stopping:               ED COURSE: The patient's hospital course has been uncomplicated.

## 2018-05-22 NOTE — DISCHARGE INSTRUCTIONS
We hope that we have addressed all of your medical concerns. The examination and treatment you received in the Emergency Department were for an emergent problem and were not intended as complete care. It is important that you follow up with your healthcare provider(s) for ongoing care. If your symptoms worsen or do not improve as expected, and you are unable to reach your usual health care provider(s), you should return to the Emergency Department. Today's healthcare is undergoing tremendous change, and patient satisfaction surveys are one of the many tools to assess the quality of medical care. You may receive a survey from the [employer] regarding your experience in the Emergency Department. I hope that your experience has been completely positive, particularly the medical care that I provided. As such, please participate in the survey; anything less than excellent does not meet my expectations or intentions. [facility] and [facility] participate in nationally recognized quality of care measures. If your blood pressure is greater than 120/80, as reported below, we urge that you seek medical care to address the potential of high blood pressure, commonly known as hypertension. Hypertension can be hereditary or can be caused by certain medical conditions, pain, stress, or \"white coat syndrome. \"       Please make an appointment with your health care provider(s) for follow up of your Emergency Department visit. VITALS:   Patient Vitals for the past 8 hrs:   Temp Pulse Resp BP SpO2   05/22/18 0215 - 84 22 143/82 99 %   05/22/18 0100 - 85 15 153/80 98 %   05/22/18 0030 - 79 16 (!) 173/98 99 %   05/22/18 0015 - 68 15 (!) 170/92 99 %   05/22/18 0001 98.6 °F (37 °C) 77 20 (!) 159/108 100 %          Thank you for allowing us to provide you with medical care today. We realize that you have many choices for your emergency care needs.   Please choose us in the future for any continued health care needs. Thierno Ramirez Bickers, [address] [facility] [address]   Office: [phone]            Recent Results (from the past 24 hour(s))   TROPONIN I    Collection Time: 05/22/18 12:07 AM   Result Value Ref Range    Troponin-I, Qt. <0.04 <0.05 ng/mL   CBC WITH AUTOMATED DIFF    Collection Time: 05/22/18 12:07 AM   Result Value Ref Range    WBC 13.1 (H) 4.1 - 11.1 K/uL    RBC 4.88 4.10 - 5.70 M/uL    HGB 12.9 12.1 - 17.0 g/dL    HCT 39.9 36.6 - 50.3 %    MCV 81.8 80.0 - 99.0 FL    MCH 26.4 26.0 - 34.0 PG    MCHC 32.3 30.0 - 36.5 g/dL    RDW 15.9 (H) 11.5 - 14.5 %    PLATELET 626 194 - 283 K/uL    MPV 8.3 (L) 8.9 - 12.9 FL    NRBC 0.0 0  WBC    ABSOLUTE NRBC 0.00 0.00 - 0.01 K/uL    NEUTROPHILS 66 32 - 75 %    LYMPHOCYTES 25 12 - 49 %    MONOCYTES 5 5 - 13 %    EOSINOPHILS 3 0 - 7 %    BASOPHILS 0 0 - 1 %    IMMATURE GRANULOCYTES 1 (H) 0.0 - 0.5 %    ABS. NEUTROPHILS 8.6 (H) 1.8 - 8.0 K/UL    ABS. LYMPHOCYTES 3.3 0.8 - 3.5 K/UL    ABS. MONOCYTES 0.7 0.0 - 1.0 K/UL    ABS. EOSINOPHILS 0.4 0.0 - 0.4 K/UL    ABS. BASOPHILS 0.1 0.0 - 0.1 K/UL    ABS. IMM. GRANS. 0.1 (H) 0.00 - 0.04 K/UL    DF AUTOMATED     METABOLIC PANEL, COMPREHENSIVE    Collection Time: 05/22/18 12:07 AM   Result Value Ref Range    Sodium 137 136 - 145 mmol/L    Potassium 3.9 3.5 - 5.1 mmol/L    Chloride 104 97 - 108 mmol/L    CO2 26 21 - 32 mmol/L    Anion gap 7 5 - 15 mmol/L    Glucose 138 (H) 65 - 100 mg/dL    BUN 10 6 - 20 MG/DL    Creatinine 1.11 0.70 - 1.30 MG/DL    BUN/Creatinine ratio 9 (L) 12 - 20      GFR est AA >60 >60 ml/min/1.73m2    GFR est non-AA >60 >60 ml/min/1.73m2    Calcium 9.4 8.5 - 10.1 MG/DL    Bilirubin, total 0.1 (L) 0.2 - 1.0 MG/DL    ALT (SGPT) 39 12 - 78 U/L    AST (SGOT) 15 15 - 37 U/L    Alk.  phosphatase 103 45 - 117 U/L    Protein, total 7.8 6.4 - 8.2 g/dL    Albumin 3.6 3.5 - 5.0 g/dL    Globulin 4.2 (H) 2.0 - 4.0 g/dL    A-G Ratio 0.9 (L) 1.1 - 2.2     LIPASE    Collection Time: 05/22/18 12:07 AM   Result Value Ref Range    Lipase 193 73 - 393 U/L   TROPONIN I    Collection Time: 05/22/18  2:02 AM   Result Value Ref Range    Troponin-I, Qt. <0.04 <0.05 ng/mL       Xr Chest Port    Result Date: 5/22/2018  EXAM: Portable CXR.  0014 hours  INDICATION: chest pain The lungs are clear. Heart is normal in size. There is no overt pulmonary edema. There is no evident pneumothorax, adenopathy or pleural effusion. IMPRESSION: No Acute Disease. Chest Pain: Care Instructions  Your Care Instructions    There are many things that can cause chest pain. Some are not serious and will get better on their own in a few days. But some kinds of chest pain need more testing and treatment. Your doctor may have recommended a follow-up visit in the next 8 to 12 hours. If you are not getting better, you may need more tests or treatment. Even though your doctor has released you, you still need to watch for any problems. The doctor carefully checked you, but sometimes problems can develop later. If you have new symptoms or if your symptoms do not get better, get medical care right away. If you have worse or different chest pain or pressure that lasts more than 5 minutes or you passed out (lost consciousness), call 911 or seek other emergency help right away. A medical visit is only one step in your treatment. Even if you feel better, you still need to do what your doctor recommends, such as going to all suggested follow-up appointments and taking medicines exactly as directed. This will help you recover and help prevent future problems. How can you care for yourself at home? · Rest until you feel better. · Take your medicine exactly as prescribed. Call your doctor if you think you are having a problem with your medicine. · Do not drive after taking a prescription pain medicine. When should you call for help?   Call 911 if:  ? · You passed out (lost consciousness). ? · You have severe difficulty breathing. ? · You have symptoms of a heart attack. These may include:  ¨ Chest pain or pressure, or a strange feeling in your chest.  ¨ Sweating. ¨ Shortness of breath. ¨ Nausea or vomiting. ¨ Pain, pressure, or a strange feeling in your back, neck, jaw, or upper belly or in one or both shoulders or arms. ¨ Lightheadedness or sudden weakness. ¨ A fast or irregular heartbeat. After you call 911, the  may tell you to chew 1 adult-strength or 2 to 4 low-dose aspirin. Wait for an ambulance. Do not try to drive yourself. ?Call your doctor today if:  ? · You have any trouble breathing. ? · Your chest pain gets worse. ? · You are dizzy or lightheaded, or you feel like you may faint. ? · You are not getting better as expected. ? · You are having new or different chest pain. Where can you learn more? Go to http://gabriela-chace.info/. Enter A120 in the search box to learn more about \"Chest Pain: Care Instructions. \"  Current as of: March 20, 2017  Content Version: 11.4  © 8875-5937 [employer]. Care instructions adapted under license by [employer] (which disclaims liability or warranty for this information). If you have questions about a medical condition or this instruction, always ask your healthcare professional. [place] [address] any warranty or liability for your use of this information. Gastritis: Care Instructions  Your Care Instructions    Gastritis is a sore and upset stomach. It happens when something irritates the stomach lining. Many things can cause it. These include an infection such as the flu or something you ate or drank. Medicines or a sore on the lining of the stomach (ulcer) also can cause it. Your belly may bloat and ache. You may belch, vomit, and feel sick to your stomach. You should be able to relieve the problem by taking medicine.  And it may help to change your diet. If gastritis lasts, your doctor may prescribe medicine. Follow-up care is a key part of your treatment and safety. Be sure to make and go to all appointments, and call your doctor if you are having problems. It's also a good idea to know your test results and keep a list of the medicines you take. How can you care for yourself at home? · If your doctor prescribed antibiotics, take them as directed. Do not stop taking them just because you feel better. You need to take the full course of antibiotics. · Be safe with medicines. If your doctor prescribed medicine to decrease stomach acid, take it as directed. Call your doctor if you think you are having a problem with your medicine. · Do not take any other medicine, including over-the-counter pain relievers, without talking to your doctor first.  · If your doctor recommends over-the-counter medicine to reduce stomach acid, such as Pepcid AC, Prilosec, Tagamet HB, or Zantac 75, follow the directions on the label. · Drink plenty of fluids (enough so that your urine is light yellow or clear like water) to prevent dehydration. Choose water and other caffeine-free clear liquids. If you have kidney, heart, or liver disease and have to limit fluids, talk with your doctor before you increase the amount of fluids you drink. · Limit how much alcohol you drink. · Avoid coffee, tea, cola drinks, chocolate, and other foods with caffeine. They increase stomach acid. When should you call for help? Call 911 anytime you think you may need emergency care. For example, call if:  ? · You vomit blood or what looks like coffee grounds. ? · You pass maroon or very bloody stools. ?Call your doctor now or seek immediate medical care if:  ? · You start breathing fast and have not produced urine in the last 8 hours. ? · You cannot keep fluids down. ? Watch closely for changes in your health, and be sure to contact your doctor if:  ? · You do not get better as expected. Where can you learn more? Go to http://gabriela-chace.info/. Enter [id number] in the search box to learn more about \"Gastritis: Care Instructions. \"  Current as of: May 12, 2017  Content Version: 11.4  © 8690-6680 [employer], [employer]. Care instructions adapted under license by [employer] (which disclaims liability or warranty for this information). If you have questions about a medical condition or this instruction, always ask your healthcare professional. [address] any warranty or liability for your use of this information.

## 2018-05-22 NOTE — ED TRIAGE NOTES
Patient arrives via EMS with c/o chest pain that started around 3.5-4 hours ago. Patient has history of MI and uncontrolled HTN. EMS administered 3 SL NTG in route which relieved some of the pain but not completely.

## 2018-05-25 ENCOUNTER — HOSPITAL ENCOUNTER (OUTPATIENT)
Dept: WOUND CARE | Age: 43
Discharge: HOME | End: 2018-05-25
Payer: MEDICAID

## 2018-05-25 VITALS
RESPIRATION RATE: 18 BRPM | SYSTOLIC BLOOD PRESSURE: 144 MMHG | TEMPERATURE: 97.9 F | DIASTOLIC BLOOD PRESSURE: 92 MMHG | HEART RATE: 89 BPM

## 2018-05-25 PROCEDURE — 11042 DBRDMT SUBQ TIS 1ST 20SQCM/<: CPT

## 2018-05-25 PROCEDURE — 74011000250 HC RX REV CODE- 250: Performed by: EMERGENCY MEDICINE

## 2018-05-25 RX ORDER — LIDOCAINE 40 MG/G
CREAM TOPICAL
Status: COMPLETED | OUTPATIENT
Start: 2018-05-25 | End: 2018-05-25

## 2018-05-25 RX ADMIN — LIDOCAINE: 4 CREAM TOPICAL at 10:05

## 2018-05-25 NOTE — WOUND CARE
05/25/18 0944   Wound Leg Left   Date First Assessed/Time First Assessed: 04/16/18 0242   POA: Yes  Wound Type: Venous  Location: Leg  Orientation: Left   Wound Length (cm) 5.5 cm   Wound Width (cm) 2.5 cm   Wound Depth (cm) 0.1   Wound Surface area (cm^2) 13.75 cm^2   Tissue Type Pink   Drainage Amount  Small    Drainage Color Serosanguinous   Wound Odor None   Cleansing and Cleansing Agents  Normal saline   Dressing Type Applied Gauze;Gauze wrap (anais); Xeroform   Procedure Tolerated Well   Wound Leg Lower Right   Date First Assessed/Time First Assessed: 01/14/18 0900   POA: Yes  Wound Type: Other (comment); Blister/bullae  Location: Leg Lower  Wound Description (Optional): multiple lesions (appear like venous stasis) to right lower extremity  Orientation: Right   Wound Length (cm) 18 cm   Wound Width (cm) 13 cm   Wound Depth (cm) 0.1   Wound Surface area (cm^2) 234 cm^2   Condition of Base Granulation   Drainage Amount  Small    Drainage Color Serosanguinous   Wound Odor None   Cleansing and Cleansing Agents  Normal saline

## 2018-05-25 NOTE — PROGRESS NOTES
I have noted, and reviewed today's data for this patient in [facility] and concur with same. The focused physical exam, other physical findings, Medical history, Review of Symptoms and Medications today remains unchanged except as noted below. Patient notes today: I'm better, this next visit could be my last. . Lesion/Wound, focused exam on Presentation today: BLEs, multiple open wound ulcers with crusting, significantly closed in all but two sites one R one L with exudate and debris over the granulation. In other areas crusting comes free easily, no compressible edema noted. .    Procedure:     Wound # BLEs, open wounds, stasis ulceration. Procedure name: sharp debridement. Anaesthesia: Lidocaine; topical    Description: using a sharp curette I removed all adherent debris to effect a clean bleeding base. Dionisio Leblanc Post debridement dimensions changed as noted:    Depth, add 1.0 mm;    Width, add 0.0 mm   Length, add 0.0 mm. Blood Loss: 2 CCs  Post Procedure Condition/ Diagnosis: good progress, near complete closure. Follow up and Future plans today: return 1 week, continue care, plan on transition to elastic support after next visit. Dionisio Leblanc Specimens: 0. Patient Counseled regarding/Discussed: as above, long discussion regarding use of compression to control edema long term. Clinical Considerations: watch for skin care, control drying and edema. Dionisio Leblanc

## 2018-05-31 ENCOUNTER — TELEPHONE (OUTPATIENT)
Dept: WOUND CARE | Age: 43
End: 2018-05-31

## 2018-06-01 ENCOUNTER — HOSPITAL ENCOUNTER (OUTPATIENT)
Dept: WOUND CARE | Age: 43
Discharge: HOME | End: 2018-06-01
Payer: MEDICAID

## 2018-06-01 VITALS — TEMPERATURE: 98.2 F | SYSTOLIC BLOOD PRESSURE: 143 MMHG | HEART RATE: 86 BPM | DIASTOLIC BLOOD PRESSURE: 88 MMHG

## 2018-06-01 PROCEDURE — 11042 DBRDMT SUBQ TIS 1ST 20SQCM/<: CPT

## 2018-06-01 NOTE — WOUND CARE
06/01/18 0949   Wound Leg Lower Right   Date First Assessed/Time First Assessed: 01/14/18 0900   POA: Yes  Wound Type: Other (comment); Blister/bullae  Location: Leg Lower  Wound Description (Optional): multiple lesions (appear like venous stasis) to right lower extremity  Orientation: Right   Wound Length (cm) 9 cm   Wound Width (cm) 12 cm   Wound Depth (cm) 0.1   Wound Surface area (cm^2) 108 cm^2   Condition of Base Granulation   Drainage Amount  Small    Drainage Color Serosanguinous   Wound Odor None   Cleansing and Cleansing Agents  Normal saline   Wound Leg Left   Date First Assessed/Time First Assessed: 04/16/18 0242   POA: Yes  Wound Type: Venous  Location: Leg  Orientation: Left   Wound Length (cm) 7 cm   Wound Width (cm) 5 cm   Wound Depth (cm) 0.1   Wound Surface area (cm^2) 35 cm^2   Tissue Type Pink   Drainage Amount  Small    Drainage Color Serosanguinous   Wound Odor None

## 2018-06-01 NOTE — PROGRESS NOTES
I have noted, and reviewed today's data for this patient in [facility] and concur with same. The focused physical exam, other physical findings, Medical history, Review of Symptoms and Medications today remains unchanged except as noted below. Patient notes today: I'm Ok but I scraped the wound RLE. Lesion/Wound, focused exam on Presentation today: BLEs stasis and open wound ulcers. RLE distal lateral calf with sub Q ulcer with exudate and some debris  LLE posterior calf with increased area exudate and slough in ulcer base. Tenderness. .    Procedure:     Wound # BLEs stasis ulcers. Procedure name: sharp debridement. Anaesthesia: Lidocaine; topical    Description: using a sharp curette I removed adherent debris and slough/exudate from the ulcer bases to effect a clean bleeding surface. Abhi Opoka Post debridement dimensions changed as noted:    Depth, add 1.0 mm;    Width, add 0.0 mm   Length, add 0.0 mm. Blood Loss: 3 CCs  Post Procedure Condition/ Diagnosis: as above, some increased wound from recent trauma but generally still progressive healing. Follow up and Future plans today: continue with wound clean, Xeroform, return 1 week. Abhi Opoka Specimens: 0. Patient Counseled regarding/Discussed: as above, need to avoid trauma, continue to control edema, good skin hygiene. Clinical Considerations: as above, .

## 2018-06-07 ENCOUNTER — TELEPHONE (OUTPATIENT)
Dept: WOUND CARE | Age: 43
End: 2018-06-07

## 2018-06-08 ENCOUNTER — APPOINTMENT (OUTPATIENT)
Dept: WOUND CARE | Age: 43
End: 2018-06-08

## 2019-06-14 NOTE — ED PROVIDER NOTES
Bladder Infection, Female (Adult)    Urine is normally doesn't have any bacteria in it. But bacteria can get into the urinary tract from the skin around the rectum. Or they can travel in the blood from elsewhere in the body. Once they are in your urinary tract, they can cause infection in the urethra (urethritis), the bladder (cystitis), or the kidneys (pyelonephritis).  The most common place for an infection is in the bladder. This is called a bladder infection. This is one of the most common infections in women. Most bladder infections are easily treated. They are not serious unless the infection spreads to the kidney.  The phrases \"bladder infection,\" \"UTI,\" and \"cystitis\" are often used to describe the same thing. But they are not always the same. Cystitis is an inflammation of the bladder. The most common cause of cystitis is an infection.  Symptoms  The infection causes inflammation in the urethra and bladder. This causes many of the symptoms. The most common symptoms of a bladder infection are:  · Pain or burning when urinating  · Having to urinate more often than usual  · Urgent need to urinate  · Only a small amount of urine comes out  · Blood in urine  · Abdominal discomfort. This is usually in the lower abdomen above the pubic bone.  · Cloudy urine  · Strong- or bad-smelling urine  · Unable to urinate (urinary retention)  · Unable to hold urine in (urinary incontinence)  · Fever  · Loss of appetite  · Confusion (in older adults)  Causes  Bladder infections are not contagious. You can't get one from someone else, from a toilet seat, or from sharing a bath.  The most common cause of bladder infections is bacteria from the bowels. The bacteria get onto the skin around the opening of the urethra. From there, they can get into the urine and travel up to the bladder, causing inflammation and infection. This usually happens because of:  · Wiping improperly after urinating. Always wipe from front to  HPI Comments: The patient presents to the ED with leg pain. He notes wounds and pain to his legs for 3 weeks. He has been to [facility] ED twice for the symptoms, but left because \"all they would give me is Percocet. \" He denies any fever. He does have chills. He notes severe pain to both legs. He has redness and swelling to both legs. He is on Methadone, but has missed the past 5 days because of pain. [place] but [place] recall the name of the clinic. He believes he will be kicked out of the clinic for not showing up for 5 days. He denies any chest pain or shortness of breath. No n/v/d or abdominal pain. He denies hx MRSA or abscesses. He drove himself to the ED. Patient is a 36 y.o. male presenting with leg pain. The history is provided by the patient. Leg Pain           History reviewed. No pertinent past medical history. Past Surgical History:   Procedure Laterality Date    HX CORONARY STENT PLACEMENT           History reviewed. No pertinent family history. Social History     Social History    Marital status: SINGLE     Spouse name: N/A    Number of children: N/A    Years of education: N/A     Occupational History    Not on file. Social History Main Topics    Smoking status: Current Every Day Smoker     Packs/day: 1.00    Smokeless tobacco: Never Used    Alcohol use No    Drug use: No    Sexual activity: Not on file     Other Topics Concern    Not on file     Social History Narrative         ALLERGIES: Review of patient's allergies indicates no known allergies. Review of Systems   Constitutional: Negative for appetite change and fever. HENT: Negative for congestion, nosebleeds and sore throat. Eyes: Negative for pain and discharge. Respiratory: Negative for cough and shortness of breath. Cardiovascular: Positive for leg swelling. Negative for chest pain. Gastrointestinal: Negative for abdominal pain, diarrhea, nausea and vomiting. Genitourinary: Negative for dysuria. Musculoskeletal: Negative. Skin: Positive for color change and wound. Negative for rash. Neurological: Negative for weakness and headaches. Hematological: Negative for adenopathy. Psychiatric/Behavioral: Negative. All other systems reviewed and are negative. Vitals:    01/14/18 0225   BP: (!) 165/91   Pulse: (!) 101   Resp: 24   Temp: 98 °F (36.7 °C)   SpO2: 98%   Weight: 95.3 kg (210 lb)   Height: 5' 8\" (1.727 m)            Physical Exam   Constitutional: He is oriented to person, place, and time. He appears well-developed and well-nourished. HENT:   Head: Normocephalic and atraumatic. Mouth/Throat: Oropharynx is clear and moist.   Eyes: Conjunctivae are normal.   Neck: Normal range of motion. Neck supple. Cardiovascular: Normal rate, regular rhythm and normal heart sounds. Pulmonary/Chest: Effort normal and breath sounds normal.   Abdominal: Soft. Bowel sounds are normal. There is no tenderness. Musculoskeletal: Normal range of motion. He exhibits edema. He exhibits no tenderness. Neurological: He is alert and oriented to person, place, and time. Skin: Skin is warm and dry. There is erythema. Psychiatric: He has a normal mood and affect. His behavior is normal.   Nursing note and vitals reviewed. [facility]  ED Course       Procedures                                         3:53 AM  The patient returned from x-ray. He did not have x-rays as he refused stating inadequate pain control. The patient is very upset and requesting more pain medications. I explained that I would not be giving him more IV opiates as he will not be getting IV opiates inpatient. He began cursing at me and his friend. I explained that his pain relief will not be immediate, but will require antibiotics and wound care. He asked to be put to sleep until the pain went away. I explained that I would not be putting him asleep or giving IV opiates. He continues to curse.  I back.  · Bowel incontinence  · Pregnancy  · Procedures such as having a catheter inserted  · Older age  · Not emptying your bladder. This can allow bacteria a chance to grow in your urine.  · Dehydration  · Constipation  · Sex  · Use of a diaphragm for birth control   Treatment  Bladder infections are diagnosed by a urine test. They are treated with antibiotics and usually clear up quickly without complications. Treatment helps prevent a more serious kidney infection.  Medicines  Medicines can help in the treatment of a bladder infection:  · Take antibiotics until they are used up, even if you feel better. It is important to finish them to make sure the infection has cleared.  · You can use acetaminophen or ibuprofen for pain, fever, or discomfort, unless another medicine was prescribed. If you have chronic liver or kidney disease, talk with your healthcare provider before using these medicines. Also talk with your provider if you've ever had a stomach ulcer or gastrointestinal bleeding, or are taking blood-thinner medicines.  · If you are given phenazopydridine to reduce burning with urination, it will cause your urine to become a bright orange color. This can stain clothing.  Care and prevention  These self-care steps can help prevent future infections:  · Drink plenty of fluids to prevent dehydration and flush out your bladder. Do this unless you must restrict fluids for other health reasons, or your doctor told you not to.  · Proper cleaning after going to the bathroom is important. Wipe from front to back after using the toilet to prevent the spread of bacteria.  · Urinate more often. Don't try to hold urine in for a long time.  · Wear loose-fitting clothes and cotton underwear. Avoid tight-fitting pants.  · Improve your diet and prevent constipation. Eat more fresh fruit and vegetables, and fiber, and less junk and fatty foods.  · Avoid sex until your symptoms are gone.  · Avoid caffeine, alcohol, and spicy  advised him to stop cursing and to lower his voice. 4:24 AM  Patient was screaming and cursing again. Stated he wanted to leave. I went to get AMA form. He now states he will stay. Informed again that he was given po pain medication and will not be getting more IV pain medications. 4:37 AM  Back from x-ray. Threw emesis bag at tech and refused CXR and ankle x-rays. 5:41 AM  Complaining of \"swollen tonsils\" Uvula is noted to be edematous. No tongue swelling. No hoarseness. No stridor. Dr. Dalila Collins notified. Changing to ICU bed. Decadron and Benadryl given. A/P:  1. Leg wounds - pain out of proportion than appearance. Admit for further evaluation. 2. ?  Cellulitis - will treat given degree of pain. 3. Uvular edema - no airway compromise. Decadron and Benadryl. foods. These can irritate your bladder.  · Urinate right after intercourse to flush out your bladder.  · If you use birth control pills and have frequent bladder infections, discuss it with your doctor.  Follow-up care  Call your healthcare provider if all symptoms are not gone after 3 days of treatment. This is especially important if you have repeat infections.  If a culture was done, you will be told if your treatment needs to be changed. If directed, you can call to find out the results.  If X-rays were done, you will be told if the results will affect your treatment.  Call 911  Call 911 if any of the following occur:  · Trouble breathing  · Hard to wake up or confusion  · Fainting or loss of consciousness  · Rapid heart rate  When to seek medical advice  Call your healthcare provider right away if any of these occur:  · Fever of 100.4ºF (38.0ºC) or higher, or as directed by your healthcare provider  · Symptoms are not better by the third day of treatment  · Back or belly (abdominal) pain that gets worse  · Repeated vomiting, or unable to keep medicine down  · Weakness or dizziness  · Vaginal discharge  · Pain, redness, or swelling in the outer vaginal area (labia)  Date Last Reviewed: 10/1/2016  © 0806-7446 [employer]. [address] [place] [address], [place] [address]. All rights reserved. This information is not intended as a substitute for professional medical care. Always follow your healthcare professional's instructions.        Head Injury (Adult)    You have a head injury. It does not appear serious at this time. But symptoms of a more serious problem, such as a mild brain injury (concussion) or bruising or bleeding in the brain, may appear later. For this reason, you or someone caring for you will need to watch for the symptoms listed below. Once you’re home, also be sure to follow any care instructions you’re given.  Home care  Watch for the following symptoms  Seek emergency medical care if you  have any of these symptoms over the next hours to days:   · Headache  · Nausea or vomiting  · Dizziness  · Sensitivity to light or noise  · Unusual sleepiness or grogginess  · Trouble falling asleep  · Personality changes  · Vision changes  · Memory loss  · Confusion  · Trouble walking or clumsiness  · Loss of consciousness (even for a short time)  · Inability to be awakened  · Stiff neck  · Weakness or numbness in any part of the body  · Seizures  General care  · If you were prescribed medicines for pain, use them as directed. Note: Don’t take other medicines for pain without talking to your provider first.  · To help reduce swelling and pain, apply a cold source to the injured area for up to 20 minutes at a time. Do this as often as directed. Use a cold pack or bag of ice wrapped in a thin towel. Never apply a cold source directly to the skin.  · If you have cuts or scrapes as a result of your head injury, care for them as directed.  · For the next 24 hours (or longer, if instructed):  ? Don’t drink alcohol or use sedatives or other medicines that make you sleepy.  ? Don’t drive or operate machinery.  ? Don’t do anything strenuous, such as heavy lifting or straining.  ? Limit tasks that require concentration. This includes reading, using a smartphone or computer, watching TV, and playing video games.  ? Don’t return to sports or other activities that could result in another head injury.  Follow-up care  Follow up with your healthcare provider, or as directed. If imaging tests were done, they will be reviewed by a doctor. You will be told the results and any new findings that may affect your care.  When to seek medical advice  Call your healthcare provider right away if any of these occur:  · Pain doesn’t get better or worsens  · New or increased swelling or bruising  · Fever of 100.4°F (38°C) or higher, or as directed by your provider  · Increased redness, warmth, drainage, or bleeding from the injured  area  · Fluid drainage or bleeding from the nose or ears  · Any depression or bony abnormality in the injured area  Date Last Reviewed: 9/26/2015  © 3935-9346 [employer] [place]. [address] [place] [address], [place] [address]. All rights reserved. This information is not intended as a substitute for professional medical care. Always follow your healthcare professional's instructions.

## 2020-01-01 NOTE — PROGRESS NOTES
Renal Progress Note    NAME:  Nnamdi Riley   :   1975   MRN:   [id number]     Date/Time:  2018 3:54 PM      Assessment:   1. Acute Kidney Injury --> ? ATN vs ? AIN --> possibly antibiotic Rx. 2. Cellulitis  3. Hyperkalemia - resolved       Plan:   1. The serum creatinine is heading in the right direction. He is no longer of Vanc and Zosyn. 2. Follow lytes  3. Avoid NSAIDs and IV contrast.  4. Hold ACE-Is, ARBs and Renin Antagonists. Subjective:             Mr. Justyna Reed complained of pain in the extremities.           Review of Systems:  Y  N       Y  N  []         []          Fever/chills                                               []         []          Chest Pain  []         []          Cough                                                       []         []          Diarrhea   []         []          Sputum                                                     []         []          Constipation  []         []          SOB/MILLER                                                []         []          Nausea/Vomit  []         []          Abd Pain                                                    []         []          Tolerating PT  []         []          Dysuria                                                      []         []          Tolerating Diet     []        Unable to obtain  ROS due to  []        mental status change  []        sedated   []        intubated    Medications reviewed:  Current Facility-Administered Medications   Medication Dose Route Frequency    clindamycin (CLEOCIN) 600mg NS 50 mL IVPB (premix)  600 mg IntraVENous Q8H    meropenem (MERREM) 500 mg in 0.9% sodium chloride (MBP/ADV) 50 mL MBP  500 mg IntraVENous Q8H    HYDROmorphone (DILAUDID) injection 0.5 mg  0.5 mg IntraVENous Q4H PRN    oxyCODONE IR (ROXICODONE) tablet 20 mg  20 mg Oral Q4H PRN    polyethylene glycol (MIRALAX) packet 17 g  17 g Oral DAILY    senna-docusate (PERICOLACE) 8.6-50 mg per tablet 1 Tab  1 Tab Oral QHS    naloxone (NARCAN) injection 0.4 mg  0.4 mg IntraVENous EVERY 2 MINUTES AS NEEDED    methadone (DOLOPHINE) tablet 110 mg  110 mg Oral DAILY    0.9% sodium chloride infusion  50 mL/hr IntraVENous CONTINUOUS    sodium chloride (NS) flush 5-10 mL  5-10 mL IntraVENous Q8H    sodium chloride (NS) flush 5-10 mL  5-10 mL IntraVENous PRN    acetaminophen (TYLENOL) tablet 650 mg  650 mg Oral Q4H PRN    prochlorperazine (COMPAZINE) injection 10 mg  10 mg IntraVENous Q6H PRN    zolpidem (AMBIEN) tablet 5 mg  5 mg Oral QHS PRN    enoxaparin (LOVENOX) injection 40 mg  40 mg SubCUTAneous Q24H    aluminum-magnesium hydroxide (MAALOX) oral suspension 30 mL  30 mL Oral Q6H PRN    sodium chloride (NS) flush 5-10 mL  5-10 mL IntraVENous PRN        Objective:   Vitals:  Visit Vitals    /88 (BP 1 Location: Right arm, BP Patient Position: Sitting)    Pulse 91    Temp 100.1 °F (37.8 °C)    Resp 18    Ht 5' 7\" (1.702 m)    Wt 99.4 kg (219 lb 2.2 oz)    SpO2 98%    BMI 34.32 kg/m2     Temp (24hrs), Av.1 °F (37.3 °C), Min:98.2 °F (36.8 °C), Max:100.1 °F (37.8 °C)      O2 Device: Room air    Last 24hr Input/Output:    Intake/Output Summary (Last 24 hours) at 18 1554  Last data filed at 18 0727   Gross per 24 hour   Intake                0 ml   Output             2225 ml   Net            -2225 ml        PHYSICAL EXAM:    Seen in Room 536. General:    He was lying in the chair. Head:   Normocephalic    Eyes:   No icterus    . Lungs:   Clear to auscultation, No rales. Heart:   No S 3  gallop. Abdomen:    Not distended. Extremities: Dressings - both legs    Psych:   Not anxious or agitated. Neuro:               Alert and oriented .         []        Telemetry Reviewed     []        NSR []        PAC/PVCs   []        Afib  []        Paced   []        NSVT   []        [place] []        NGT  []        Intubated on vent    Lab Data Reviewed:    Recent Results (from the past 24 hour(s))   CBC WITH AUTOMATED DIFF    Collection Time: 04/20/18  1:16 AM   Result Value Ref Range    WBC 13.2 (H) 4.1 - 11.1 K/uL    RBC 4.34 4.10 - 5.70 M/uL    HGB 11.4 (L) 12.1 - 17.0 g/dL    HCT 36.2 (L) 36.6 - 50.3 %    MCV 83.4 80.0 - 99.0 FL    MCH 26.3 26.0 - 34.0 PG    MCHC 31.5 30.0 - 36.5 g/dL    RDW 14.7 (H) 11.5 - 14.5 %    PLATELET 556 537 - 018 K/uL    MPV 8.7 (L) 8.9 - 12.9 FL    NRBC 0.0 0  WBC    ABSOLUTE NRBC 0.00 0.00 - 0.01 K/uL    NEUTROPHILS 74 32 - 75 %    BAND NEUTROPHILS 5 0 - 6 %    LYMPHOCYTES 11 (L) 12 - 49 %    MONOCYTES 8 5 - 13 %    EOSINOPHILS 1 0 - 7 %    BASOPHILS 0 0 - 1 %    METAMYELOCYTES 1 (H) 0 %    IMMATURE GRANULOCYTES 0 %    ABS. NEUTROPHILS 10.4 (H) 1.8 - 8.0 K/UL    ABS. LYMPHOCYTES 1.5 0.8 - 3.5 K/UL    ABS. MONOCYTES 1.1 (H) 0.0 - 1.0 K/UL    ABS. EOSINOPHILS 0.1 0.0 - 0.4 K/UL    ABS. BASOPHILS 0.0 0.0 - 0.1 K/UL    ABS. IMM.  GRANS. 0.0 K/UL    DF MANUAL      RBC COMMENTS NORMOCYTIC, NORMOCHROMIC     RENAL FUNCTION PANEL    Collection Time: 04/20/18  1:16 AM   Result Value Ref Range    Sodium 134 (L) 136 - 145 mmol/L    Potassium 5.1 3.5 - 5.1 mmol/L    Chloride 99 97 - 108 mmol/L    CO2 25 21 - 32 mmol/L    Anion gap 10 5 - 15 mmol/L    Glucose 133 (H) 65 - 100 mg/dL    BUN 23 (H) 6 - 20 MG/DL    Creatinine 2.28 (H) 0.70 - 1.30 MG/DL    BUN/Creatinine ratio 10 (L) 12 - 20      GFR est AA 38 (L) >60 ml/min/1.73m2    GFR est non-AA 31 (L) >60 ml/min/1.73m2    Calcium 9.0 8.5 - 10.1 MG/DL    Phosphorus 3.8 2.6 - 4.7 MG/DL    Albumin 2.4 (L) 3.5 - 5.0 g/dL       Total time spent with patient:  []        15   []        25   []        35   []         __ minutes    []        Critical Care Provided    Care Plan discussed with:          []        Patient   []        Family    []        Care Manager   []        Consultant/Specialist :      []          >50% of visit spent in counseling and coordination of care   (Discussed []        CODE status,  [] Care Plan, []        D/C Planning)    ___________________________________________________    Attending Physician: Adrian Killian MD [facility])  Pediatrics  [address] [place] [address]  Phone: [phone]  Fax: [phone]  Follow Up Time: 1-3 days
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
## (undated) DEVICE — Device

## (undated) DEVICE — DEVON™ KNEE AND BODY STRAP 60" X 3" (1.5 M X 7.6 CM): Brand: DEVON

## (undated) DEVICE — ROCKER SWITCH PENCIL BLADE ELECTRODE, HOLSTER: Brand: EDGE

## (undated) DEVICE — (D)BNDG COHESIVE 6X5YD TAN LTX -- DUPE USE ITEM 357348

## (undated) DEVICE — SOLUTION IV 1000ML 0.9% SOD CHL

## (undated) DEVICE — CONVERTORS STOCKINETTE: Brand: CONVERTORS

## (undated) DEVICE — INTENDED FOR TISSUE SEPARATION, AND OTHER PROCEDURES THAT REQUIRE A SHARP SURGICAL BLADE TO PUNCTURE OR CUT.: Brand: BARD-PARKER ® CARBON RIB-BACK BLADES

## (undated) DEVICE — GAUZE SPONGES,12 PLY: Brand: CURITY

## (undated) DEVICE — STERILE POLYISOPRENE POWDER-FREE SURGICAL GLOVES: Brand: PROTEXIS

## (undated) DEVICE — SURGICAL PROCEDURE PACK BASIN MAJ SET CUST NO CAUT

## (undated) DEVICE — INSULATED BLADE ELECTRODE: Brand: EDGE

## (undated) DEVICE — NON-ADHERENT STRIPS,OIL EMULSION: Brand: CURITY

## (undated) DEVICE — BASIC PACK: Brand: CONVERTORS

## (undated) DEVICE — REM POLYHESIVE ADULT PATIENT RETURN ELECTRODE: Brand: VALLEYLAB

## (undated) DEVICE — INFECTION CONTROL KIT SYS

## (undated) DEVICE — LIGHT HANDLE: Brand: DEVON

## (undated) DEVICE — 3000CC GUARDIAN II: Brand: GUARDIAN

## (undated) DEVICE — TOWEL SURG W17XL27IN STD BLU COT NONFENESTRATED PREWASHED

## (undated) DEVICE — TIBURON SPLIT SHEET: Brand: CONVERTORS

## (undated) DEVICE — HOOK LOCK LATEX FREE ELASTIC BANDAGE 6INX5YD

## (undated) DEVICE — DRAPE,EXTREMITY,89X128,STERILE: Brand: MEDLINE

## (undated) DEVICE — DRAPE,REIN 53X77,STERILE: Brand: MEDLINE